# Patient Record
Sex: FEMALE | Race: WHITE | NOT HISPANIC OR LATINO | Employment: OTHER | URBAN - METROPOLITAN AREA
[De-identification: names, ages, dates, MRNs, and addresses within clinical notes are randomized per-mention and may not be internally consistent; named-entity substitution may affect disease eponyms.]

---

## 2018-02-14 ENCOUNTER — OFFICE VISIT CONVERTED (OUTPATIENT)
Dept: PULMONOLOGY | Facility: CLINIC | Age: 58
End: 2018-02-14
Attending: INTERNAL MEDICINE

## 2018-03-09 ENCOUNTER — OFFICE VISIT CONVERTED (OUTPATIENT)
Dept: PULMONOLOGY | Facility: CLINIC | Age: 58
End: 2018-03-09
Attending: PHYSICIAN ASSISTANT

## 2018-04-11 ENCOUNTER — OFFICE VISIT CONVERTED (OUTPATIENT)
Dept: PULMONOLOGY | Facility: CLINIC | Age: 58
End: 2018-04-11
Attending: INTERNAL MEDICINE

## 2019-03-13 ENCOUNTER — OFFICE VISIT CONVERTED (OUTPATIENT)
Dept: ORTHOPEDIC SURGERY | Facility: CLINIC | Age: 59
End: 2019-03-13
Attending: ORTHOPAEDIC SURGERY

## 2019-04-19 NOTE — PROGRESS NOTES
"Date of Office Visit: 2019  Encounter Provider: Demond Owen MD  Place of Service: [facility] CARDIOLOGY  Patient Name: Shaneka Guido  :1960    Chief Complaint   Patient presents with   • Congestive Heart Failure   :     HPI: Shaneka Guido is a 58 y.o. female who presents today to \[facility]\"" care.  She has lived in many cities and even in [place], and has seen cardiologists at [place], in [place], and in [place].  However, it's been many years since she has been evaluated by a cardiologist.    All of her siblings and her mother have had severe CAD and PAD.  At the age of 35, she underwent aortobifemoral bypass.  At age 45, she woke up with an acute ischemic right leg and required urgent surgical revascularization.  She had an arterial doppler in  that reported left subclavian stenosis; she is unaware of this but does say that her blood pressure shouldn't be checked in that arm as it's \"too low.\"  She had 50-69% carotid disease in 2012.    In 2012, she presented with chest pain that she described as a feeling of \"ice\" in her chest.  She was found to have an acute anterior MI.  She underwent emergent BMS to the LAD and then underwent CABG the next day (details are in HPI).  Her LVEF was 50%.  She has had angiograms since then, but they've been difficult due to access issues.    Overall, she's done well.  She has no claudication and no symptoms of subclavian steal.  She has chronic dyspnea and does have emphysema/chronic bronchitis (moderately severe by PFTs).  She has had some very mild sensations of \"ice\" in her chest recently but nothing like before.     She denies edema, orthopnea, syncope, or palpitations.  Her PMH states that she has chronic diastolic CHF but she denies this; she does not take diuretics.    ADDENDUM:      Records were received from Dr Aleman in [place]. An echo in 2015 showed an LVEF of 40-45% with regional WMA (although I don't " see which ones) and no valvular disease.  ABIs in November 2014 reported severe disease bilaterally consistent with proximal bilateral stenosis.      Past Medical History:   Diagnosis Date   • Anxiety disorder    • CAD (coronary artery disease)     anterior MI 2012, BMS to LAD, then CABG x 4 (LIMA-LAD, Y SVG-OM1-OM2, SVG-rPDA)   • Chronic bronchitis (CMS/HCC)    • COPD (chronic obstructive pulmonary disease) (CMS/HCC)    • Essential hypertension    • Generalized headaches    • GERD (gastroesophageal reflux disease)    • Hemorrhoids    • History of tobacco use    • Hyperlipidemia    • Osteoarthritis    • Panic attacks    • Subclavian artery stenosis, left (CMS/HCC)     BP should not be checked in that arm       Past Surgical History:   Procedure Laterality Date   • ANGIOPLASTY     • BRONCHOSCOPY     • CAROTID STENT     • CHOLECYSTECTOMY     • CORONARY ARTERY BYPASS GRAFT     • HYSTERECTOMY     • LAPAROSCOPIC TUBAL LIGATION         Social History     Socioeconomic History   • Marital status: Single     Spouse name: Not on file   • Number of children: Not on file   • Years of education: Not on file   • Highest education level: Not on file   Tobacco Use   • Smoking status: Former Smoker   Substance and Sexual Activity   • Alcohol use: No     Frequency: Never   • Drug use: No       Family History   Problem Relation Age of Onset   • Chronic bronchitis Mother    • Heart disease Mother    • Hypertension Mother    • Osteoporosis Mother    • Cancer Father    • Heart disease Father    • Diabetes Father    • Hypertension Sister    • Osteoporosis Sister    • Asthma Daughter    • Hypertension Daughter    • Osteoporosis Daughter    • Hypertension Son    • Chronic bronchitis Son        Review of Systems   Constitution: Positive for malaise/fatigue.   Cardiovascular: Positive for chest pain and dyspnea on exertion.   Respiratory: Positive for cough.    All other systems reviewed and are negative.      Allergies   Allergen Reactions  "  • Albuterol Other (See Comments)     Mouth blisters, throat swells   • Iodinated Diagnostic Agents Other (See Comments)     Throat swells   • Latex Other (See Comments)     Severe blistering, throat swells   • Oxycodone Nausea And Vomiting   • Penicillins Other (See Comments)     Throat swells         Current Outpatient Medications:   •  atorvastatin (LIPITOR) 20 MG tablet, Take 20 mg by mouth Daily., Disp: , Rfl:   •  clopidogrel (PLAVIX) 75 MG tablet, Take 75 mg by mouth Daily., Disp: , Rfl: 2  •  levalbuterol (XOPENEX HFA) 45 MCG/ACT inhaler, INHALE 1 TO 2 PUFFS BY MOUTH FOUR TIMES DAILY, Disp: , Rfl: 0  •  metoprolol tartrate (LOPRESSOR) 25 MG tablet, Take 25 mg by mouth 2 (Two) Times a Day., Disp: , Rfl: 2  •  raNITIdine (ZANTAC) 150 MG tablet, Take 150 mg by mouth As Needed for Heartburn., Disp: , Rfl:   •  STIOLTO RESPIMAT 2.5-2.5 MCG/ACT aerosol solution inhaler, INHALE TWO PUFFS BY MOUTH EVERY DAY, Disp: , Rfl: 9      Objective:     Vitals:    04/22/19 1431   BP: 136/60   BP Location: Right arm   Pulse: 73   Weight: 96.6 kg (213 lb)   Height: 152.4 cm (60\")     Body mass index is 41.6 kg/m².    Physical Exam   Constitutional: She is oriented to person, place, and time.   Obese   HENT:   Head: Normocephalic.   Nose: Nose normal.   Mouth/Throat: Oropharynx is clear and moist. She has dentures.   Eyes: Conjunctivae and EOM are normal. Pupils are equal, round, and reactive to light.   Neck: Normal range of motion.   Cannot assess for JVD due to habitus   Cardiovascular: Normal rate, regular rhythm, normal heart sounds and intact distal pulses.   No murmur heard.  Pulmonary/Chest: Effort normal.   Abdominal: Soft.   Obesity limits abdominal exam   Musculoskeletal: Normal range of motion. She exhibits no edema.   Neurological: She is alert and oriented to person, place, and time. No cranial nerve deficit.   Skin: Skin is warm and dry. No rash noted.   Psychiatric: She has a normal mood and affect. Her behavior " is normal. Judgment and thought content normal.   Vitals reviewed.        ECG 12 Lead  Date/Time: 4/22/2019 2:46 PM  Performed by: Demond Owen MD  Authorized by: Demond Owen MD   Comparison: compared with previous ECG   Similar to previous ECG  Comparison to previous ECG: C/w prior, the ST/T changes are more noticeable  Rhythm: sinus rhythm  Conduction: conduction normal  Q waves: III, II and aVF    T inversion: V6, III, II and aVF  T flattening: V5, aVL and I    Clinical impression: abnormal EKG              Assessment:       Diagnosis Plan   1. Coronary artery disease involving coronary bypass graft of native heart without angina pectoris  ECG 12 Lead    Stress Test With Pet Myocardial Perfusion (MULTI STUDY, REST AND STRESS)    Adult Transthoracic Echo Complete W/ Cont if Necessary Per Protocol   2. Chronic diastolic congestive heart failure (CMS/HCC)  Stress Test With Pet Myocardial Perfusion (MULTI STUDY, REST AND STRESS)    Adult Transthoracic Echo Complete W/ Cont if Necessary Per Protocol   3. PAD (peripheral artery disease) (CMS/HCC)  Ambulatory Referral to Vascular Surgery   4. Bilateral carotid artery disease, unspecified type (CMS/HCC)  Ambulatory Referral to Vascular Surgery   5. Essential hypertension     6. Chronic obstructive pulmonary disease, unspecified COPD type (CMS/HCC)            Plan:       1.  Coronary Artery Disease  Assessment  • The patient has CCS class I - angina only during strenuous or prolonged physical activity  • She's on clopidogrel for monotherapy due to PAD, and is on atorvastatin.  She is having some mild atypical chest pain.  Her EKG is slightly changed from a prior study.  I will check a PET stress.  If that's abnormal, I do have concerns about how she would be cathed given her aortobifem and occluded left subclavian.    Subjective - Objective  • There is a history of past MI 2012  • There is a history of previous coronary artery bypass graft 2012  • There has been a  previous stent procedure using BMS 2012  • Current antiplatelet therapy includes clopidogrel 75 mg      2.  This is in her history, but her last proBNP was 700 which is normal.  I will check an echo.  She doesn't take diuretics.    3/4.  I'm referring her to vascular given her complex history of PAD/carotid/subclavian disease.    5.  This is within goal.    Sincerely,       Demond Owen MD

## 2019-04-22 ENCOUNTER — OFFICE VISIT (OUTPATIENT)
Dept: CARDIOLOGY | Facility: CLINIC | Age: 59
End: 2019-04-22

## 2019-04-22 VITALS
DIASTOLIC BLOOD PRESSURE: 60 MMHG | SYSTOLIC BLOOD PRESSURE: 136 MMHG | HEIGHT: 60 IN | BODY MASS INDEX: 41.82 KG/M2 | HEART RATE: 73 BPM | WEIGHT: 213 LBS

## 2019-04-22 DIAGNOSIS — I73.9 PAD (PERIPHERAL ARTERY DISEASE) (HCC): ICD-10-CM

## 2019-04-22 DIAGNOSIS — I25.810 CORONARY ARTERY DISEASE INVOLVING CORONARY BYPASS GRAFT OF NATIVE HEART WITHOUT ANGINA PECTORIS: Primary | ICD-10-CM

## 2019-04-22 DIAGNOSIS — J44.9 CHRONIC OBSTRUCTIVE PULMONARY DISEASE, UNSPECIFIED COPD TYPE (HCC): ICD-10-CM

## 2019-04-22 DIAGNOSIS — I50.32 CHRONIC DIASTOLIC CONGESTIVE HEART FAILURE (HCC): ICD-10-CM

## 2019-04-22 DIAGNOSIS — I77.9 BILATERAL CAROTID ARTERY DISEASE, UNSPECIFIED TYPE (HCC): ICD-10-CM

## 2019-04-22 DIAGNOSIS — I10 ESSENTIAL HYPERTENSION: ICD-10-CM

## 2019-04-22 PROCEDURE — 99204 OFFICE O/P NEW MOD 45 MIN: CPT | Performed by: INTERNAL MEDICINE

## 2019-04-22 PROCEDURE — 93000 ELECTROCARDIOGRAM COMPLETE: CPT | Performed by: INTERNAL MEDICINE

## 2019-04-22 RX ORDER — LEVALBUTEROL TARTRATE 45 UG/1
AEROSOL, METERED ORAL
Refills: 0 | COMMUNITY
Start: 2019-03-01 | End: 2019-06-17

## 2019-04-22 RX ORDER — TIOTROPIUM BROMIDE AND OLODATEROL 3.124; 2.736 UG/1; UG/1
SPRAY, METERED RESPIRATORY (INHALATION)
Refills: 9 | COMMUNITY
Start: 2019-04-01 | End: 2019-06-17

## 2019-04-22 RX ORDER — RANITIDINE 150 MG/1
150 TABLET ORAL NIGHTLY
COMMUNITY
End: 2020-01-23 | Stop reason: RX

## 2019-04-22 RX ORDER — CLOPIDOGREL BISULFATE 75 MG/1
75 TABLET ORAL DAILY
Refills: 2 | COMMUNITY
Start: 2019-04-01

## 2019-04-22 RX ORDER — ATORVASTATIN CALCIUM 20 MG/1
20 TABLET, FILM COATED ORAL DAILY
COMMUNITY

## 2019-04-29 ENCOUNTER — HOSPITAL ENCOUNTER (OUTPATIENT)
Dept: CARDIOLOGY | Facility: HOSPITAL | Age: 59
Discharge: HOME | End: 2019-04-29
Admitting: INTERNAL MEDICINE

## 2019-04-29 ENCOUNTER — HOSPITAL ENCOUNTER (OUTPATIENT)
Dept: CARDIOLOGY | Facility: HOSPITAL | Age: 59
Discharge: HOME | End: 2019-04-29

## 2019-04-29 VITALS
BODY MASS INDEX: 41.82 KG/M2 | HEIGHT: 60 IN | HEART RATE: 80 BPM | SYSTOLIC BLOOD PRESSURE: 179 MMHG | DIASTOLIC BLOOD PRESSURE: 80 MMHG | WEIGHT: 213 LBS

## 2019-04-29 DIAGNOSIS — I50.32 CHRONIC DIASTOLIC CONGESTIVE HEART FAILURE (HCC): ICD-10-CM

## 2019-04-29 DIAGNOSIS — I25.810 CORONARY ARTERY DISEASE INVOLVING CORONARY BYPASS GRAFT OF NATIVE HEART WITHOUT ANGINA PECTORIS: ICD-10-CM

## 2019-04-29 DIAGNOSIS — R94.39 ABNORMAL CARDIOVASCULAR STRESS TEST: ICD-10-CM

## 2019-04-29 DIAGNOSIS — I25.708 CORONARY ARTERY DISEASE OF BYPASS GRAFT OF NATIVE HEART WITH STABLE ANGINA PECTORIS (HCC): Primary | ICD-10-CM

## 2019-04-29 LAB
ASCENDING AORTA: 2.5 CM
BH CV ECHO MEAS - ACS: 2.1 CM
BH CV ECHO MEAS - AO MAX PG (FULL): 2.6 MMHG
BH CV ECHO MEAS - AO MAX PG: 5.1 MMHG
BH CV ECHO MEAS - AO MEAN PG (FULL): 1.2 MMHG
BH CV ECHO MEAS - AO MEAN PG: 2.7 MMHG
BH CV ECHO MEAS - AO ROOT AREA (BSA CORRECTED): 1.6
BH CV ECHO MEAS - AO ROOT AREA: 7.3 CM^2
BH CV ECHO MEAS - AO ROOT DIAM: 3 CM
BH CV ECHO MEAS - AO V2 MAX: 112.5 CM/SEC
BH CV ECHO MEAS - AO V2 MEAN: 79.3 CM/SEC
BH CV ECHO MEAS - AO V2 VTI: 26 CM
BH CV ECHO MEAS - AVA(I,A): 2.2 CM^2
BH CV ECHO MEAS - AVA(I,D): 2.2 CM^2
BH CV ECHO MEAS - AVA(V,A): 2 CM^2
BH CV ECHO MEAS - AVA(V,D): 2 CM^2
BH CV ECHO MEAS - BSA(HAYCOCK): 2.1 M^2
BH CV ECHO MEAS - BSA: 1.9 M^2
BH CV ECHO MEAS - BZI_BMI: 41.6 KILOGRAMS/M^2
BH CV ECHO MEAS - BZI_METRIC_HEIGHT: 152.4 CM
BH CV ECHO MEAS - BZI_METRIC_WEIGHT: 96.6 KG
BH CV ECHO MEAS - EDV(MOD-SP2): 100 ML
BH CV ECHO MEAS - EDV(MOD-SP4): 103 ML
BH CV ECHO MEAS - EF(MOD-BP): 57 %
BH CV ECHO MEAS - EF(MOD-SP2): 56 %
BH CV ECHO MEAS - EF(MOD-SP4): 57.3 %
BH CV ECHO MEAS - ESV(MOD-SP2): 44 ML
BH CV ECHO MEAS - ESV(MOD-SP4): 44 ML
BH CV ECHO MEAS - LAT PEAK E' VEL: 11 CM/SEC
BH CV ECHO MEAS - LV DIASTOLIC VOL/BSA (35-75): 53.7 ML/M^2
BH CV ECHO MEAS - LV MAX PG: 2.4 MMHG
BH CV ECHO MEAS - LV MEAN PG: 1.5 MMHG
BH CV ECHO MEAS - LV SYSTOLIC VOL/BSA (12-30): 22.9 ML/M^2
BH CV ECHO MEAS - LV V1 MAX: 78.1 CM/SEC
BH CV ECHO MEAS - LV V1 MEAN: 59.3 CM/SEC
BH CV ECHO MEAS - LV V1 VTI: 19.6 CM
BH CV ECHO MEAS - LVLD AP2: 7.1 CM
BH CV ECHO MEAS - LVLD AP4: 7.1 CM
BH CV ECHO MEAS - LVLS AP2: 7 CM
BH CV ECHO MEAS - LVLS AP4: 6.6 CM
BH CV ECHO MEAS - LVOT AREA (M): 2.8 CM^2
BH CV ECHO MEAS - LVOT AREA: 2.9 CM^2
BH CV ECHO MEAS - LVOT DIAM: 1.9 CM
BH CV ECHO MEAS - MED PEAK E' VEL: 5 CM/SEC
BH CV ECHO MEAS - MR MAX PG: 30.5 MMHG
BH CV ECHO MEAS - MR MAX VEL: 276.1 CM/SEC
BH CV ECHO MEAS - MV A DUR: 0.14 SEC
BH CV ECHO MEAS - MV A MAX VEL: 88.8 CM/SEC
BH CV ECHO MEAS - MV DEC SLOPE: 409.1 CM/SEC^2
BH CV ECHO MEAS - MV DEC TIME: 0.23 SEC
BH CV ECHO MEAS - MV E MAX VEL: 92.6 CM/SEC
BH CV ECHO MEAS - MV E/A: 1
BH CV ECHO MEAS - MV MAX PG: 2.8 MMHG
BH CV ECHO MEAS - MV MEAN PG: 1.4 MMHG
BH CV ECHO MEAS - MV P1/2T MAX VEL: 94.6 CM/SEC
BH CV ECHO MEAS - MV P1/2T: 67.7 MSEC
BH CV ECHO MEAS - MV V2 MAX: 83.8 CM/SEC
BH CV ECHO MEAS - MV V2 MEAN: 55.5 CM/SEC
BH CV ECHO MEAS - MV V2 VTI: 34.4 CM
BH CV ECHO MEAS - MVA P1/2T LCG: 2.3 CM^2
BH CV ECHO MEAS - MVA(P1/2T): 3.2 CM^2
BH CV ECHO MEAS - MVA(VTI): 1.7 CM^2
BH CV ECHO MEAS - PA ACC TIME: 0.14 SEC
BH CV ECHO MEAS - PA MAX PG (FULL): 0.42 MMHG
BH CV ECHO MEAS - PA MAX PG: 2 MMHG
BH CV ECHO MEAS - PA PR(ACCEL): 15.6 MMHG
BH CV ECHO MEAS - PA V2 MAX: 71.4 CM/SEC
BH CV ECHO MEAS - PULM A REVS DUR: 0.1 SEC
BH CV ECHO MEAS - PULM A REVS VEL: 19.2 CM/SEC
BH CV ECHO MEAS - PULM DIAS VEL: 47.2 CM/SEC
BH CV ECHO MEAS - PULM S/D: 0.65
BH CV ECHO MEAS - PULM SYS VEL: 30.8 CM/SEC
BH CV ECHO MEAS - RV MAX PG: 1.6 MMHG
BH CV ECHO MEAS - RV MEAN PG: 0.87 MMHG
BH CV ECHO MEAS - RV V1 MAX: 63.5 CM/SEC
BH CV ECHO MEAS - RV V1 MEAN: 44.2 CM/SEC
BH CV ECHO MEAS - RV V1 VTI: 14.6 CM
BH CV ECHO MEAS - SI(AO): 98.6 ML/M^2
BH CV ECHO MEAS - SI(LVOT): 29.9 ML/M^2
BH CV ECHO MEAS - SI(MOD-SP2): 29.2 ML/M^2
BH CV ECHO MEAS - SI(MOD-SP4): 30.8 ML/M^2
BH CV ECHO MEAS - SUP REN AO DIAM: 2.2 CM
BH CV ECHO MEAS - SV(AO): 189 ML
BH CV ECHO MEAS - SV(LVOT): 57.4 ML
BH CV ECHO MEAS - SV(MOD-SP2): 56 ML
BH CV ECHO MEAS - SV(MOD-SP4): 59 ML
BH CV ECHO MEAS - TAPSE (>1.6): 2 CM2
BH CV ECHO MEAS - TR MAX VEL: 191.4 CM/SEC
BH CV ECHO MEASUREMENTS AVERAGE E/E' RATIO: 11.58
BH CV NUCLEAR PRIOR STUDY: 2
BH CV STRESS BP STAGE 1: NORMAL
BH CV STRESS COMMENTS STAGE 1: NORMAL
BH CV STRESS DOSE REGADENOSON STAGE 1: 0.4
BH CV STRESS DURATION MIN STAGE 1: 0
BH CV STRESS DURATION SEC STAGE 1: 10
BH CV STRESS HR STAGE 1: 110
BH CV STRESS PROTOCOL 1: NORMAL
BH CV STRESS RECOVERY BP: NORMAL MMHG
BH CV STRESS RECOVERY HR: 84 BPM
BH CV STRESS STAGE 1: 1
BH CV XLRA - RV BASE: 2.5 CM
BH CV XLRA - TDI S': 10 CM/SEC
LEFT ATRIUM VOLUME INDEX: 21 ML/M2
LV EF NUC BP: 50 %
MAXIMAL PREDICTED HEART RATE: 163 BPM
MAXIMAL PREDICTED HEART RATE: 163 BPM
PERCENT MAX PREDICTED HR: 67.48 %
SINUS: 2.9 CM
STJ: 2.3 CM
STRESS BASELINE BP: NORMAL MMHG
STRESS BASELINE HR: 79 BPM
STRESS PERCENT HR: 79 %
STRESS POST EXERCISE DUR SEC: 10 SEC
STRESS POST PEAK BP: NORMAL MMHG
STRESS POST PEAK HR: 110 BPM
STRESS TARGET HR: 139 BPM
STRESS TARGET HR: 139 BPM

## 2019-04-29 PROCEDURE — 25010000002 REGADENOSON 0.4 MG/5ML SOLUTION: Performed by: INTERNAL MEDICINE

## 2019-04-29 PROCEDURE — 93306 TTE W/DOPPLER COMPLETE: CPT

## 2019-04-29 PROCEDURE — 78492 MYOCRD IMG PET MLT RST&STRS: CPT

## 2019-04-29 PROCEDURE — 93016 CV STRESS TEST SUPVJ ONLY: CPT | Performed by: INTERNAL MEDICINE

## 2019-04-29 PROCEDURE — 78492 MYOCRD IMG PET MLT RST&STRS: CPT | Performed by: INTERNAL MEDICINE

## 2019-04-29 PROCEDURE — A9555 RB82 RUBIDIUM: HCPCS | Performed by: INTERNAL MEDICINE

## 2019-04-29 PROCEDURE — 93018 CV STRESS TEST I&R ONLY: CPT | Performed by: INTERNAL MEDICINE

## 2019-04-29 PROCEDURE — 0 RUBIDIUM CHLORIDE: Performed by: INTERNAL MEDICINE

## 2019-04-29 PROCEDURE — 93017 CV STRESS TEST TRACING ONLY: CPT

## 2019-04-29 PROCEDURE — 93306 TTE W/DOPPLER COMPLETE: CPT | Performed by: INTERNAL MEDICINE

## 2019-04-29 RX ADMIN — RUBIDIUM CHLORIDE RB-82 1 DOSE: 150 INJECTION, SOLUTION INTRAVENOUS at 13:45

## 2019-04-29 RX ADMIN — RUBIDIUM CHLORIDE RB-82 1 DOSE: 150 INJECTION, SOLUTION INTRAVENOUS at 14:14

## 2019-04-29 RX ADMIN — REGADENOSON 0.4 MG: 0.08 INJECTION, SOLUTION INTRAVENOUS at 14:15

## 2019-05-06 ENCOUNTER — TELEPHONE (OUTPATIENT)
Dept: CARDIOLOGY | Facility: CLINIC | Age: 59
End: 2019-05-06

## 2019-05-06 NOTE — TELEPHONE ENCOUNTER
Pt called to discuss that she received her pre op medications for the iodine/ contrast allergy.  Pt was very concerned about having a reaction to Prednisone (pre op protocol for allergy reaction), which she reports that she has an reaction of fluid retention and was advised in the past not to take it.  This was not noted in her allergy list, but has now been updated.  Pt also reports that she received a script for Benadryl 50 mg to take once at 0900, 1500 and another dose in the morning of the procedure and she was concerned about not waking up.  Pt stated during the conversation that she has never informed anyone that she is allergic to the iodine contrast and doesn't have a reaction to the contrast ; she would like for it to be removed.  I have updated pt' allergy list and removed the iodine contrast from her list.    Pt was agreeable.  I did inform her of the risk of taking it off her list and not taking the premeds as directed for the allergy.   Pt verbalized understanding.

## 2019-05-07 ENCOUNTER — HOSPITAL ENCOUNTER (OUTPATIENT)
Dept: OTHER | Facility: HOSPITAL | Age: 59
Discharge: HOME | End: 2019-05-07

## 2019-05-07 LAB
BASOPHILS # BLD AUTO: 0.04 10*3/UL (ref 0–0.2)
BASOPHILS NFR BLD AUTO: 0.6 % (ref 0–3)
CONV ABS IMM GRAN: 0.03 10*3/UL (ref 0–0.2)
CONV IMMATURE GRAN: 0.4 % (ref 0–1.8)
DEPRECATED RDW RBC AUTO: 45.5 FL (ref 36.4–46.3)
EOSINOPHIL # BLD AUTO: 0.12 10*3/UL (ref 0–0.7)
EOSINOPHIL # BLD AUTO: 1.7 % (ref 0–7)
ERYTHROCYTE [DISTWIDTH] IN BLOOD BY AUTOMATED COUNT: 14.4 % (ref 11.7–14.4)
HBA1C MFR BLD: 12.5 G/DL (ref 12–16)
HCT VFR BLD AUTO: 39 % (ref 37–47)
LYMPHOCYTES # BLD AUTO: 1.58 10*3/UL (ref 1–5)
MCH RBC QN AUTO: 28 PG (ref 27–31)
MCHC RBC AUTO-ENTMCNC: 32.1 G/DL (ref 33–37)
MCV RBC AUTO: 87.2 FL (ref 81–99)
MONOCYTES # BLD AUTO: 0.46 10*3/UL (ref 0.2–1.2)
MONOCYTES NFR BLD AUTO: 6.5 % (ref 3–10)
NEUTROPHILS # BLD AUTO: 4.87 10*3/UL (ref 2–8)
NEUTROPHILS NFR BLD AUTO: 68.5 % (ref 30–85)
NRBC CBCN: 0 % (ref 0–0.7)
PLATELET # BLD AUTO: 250 10*3/UL (ref 130–400)
PMV BLD AUTO: 9.5 FL (ref 9.4–12.3)
RBC # BLD AUTO: 4.47 10*6/UL (ref 4.2–5.4)
VARIANT LYMPHS NFR BLD MANUAL: 22.3 % (ref 20–45)
WBC # BLD AUTO: 7.1 10*3/UL (ref 4.8–10.8)

## 2019-05-08 ENCOUNTER — HOSPITAL ENCOUNTER (OUTPATIENT)
Facility: HOSPITAL | Age: 59
Setting detail: HOSPITAL OUTPATIENT SURGERY
Discharge: HOME | End: 2019-05-08
Attending: INTERNAL MEDICINE | Admitting: INTERNAL MEDICINE

## 2019-05-08 VITALS
BODY MASS INDEX: 39.65 KG/M2 | TEMPERATURE: 97.8 F | OXYGEN SATURATION: 98 % | RESPIRATION RATE: 16 BRPM | SYSTOLIC BLOOD PRESSURE: 125 MMHG | HEIGHT: 61 IN | DIASTOLIC BLOOD PRESSURE: 80 MMHG | WEIGHT: 210 LBS | HEART RATE: 63 BPM

## 2019-05-08 DIAGNOSIS — R94.39 ABNORMAL CARDIOVASCULAR STRESS TEST: ICD-10-CM

## 2019-05-08 DIAGNOSIS — I25.708 CORONARY ARTERY DISEASE OF BYPASS GRAFT OF NATIVE HEART WITH STABLE ANGINA PECTORIS (HCC): ICD-10-CM

## 2019-05-08 LAB
ANION GAP SERPL CALC-SCNC: 15 MMOL/L (ref 8–19)
BUN SERPL-MCNC: 16 MG/DL (ref 5–25)
BUN/CREAT SERPL: 15 {RATIO} (ref 6–20)
CALCIUM SERPL-MCNC: 9.3 MG/DL (ref 8.7–10.4)
CHLORIDE SERPL-SCNC: 103 MMOL/L (ref 99–111)
CONV CO2: 27 MMOL/L (ref 22–32)
CREAT UR-MCNC: 1.05 MG/DL (ref 0.5–0.9)
GFR SERPLBLD BASED ON 1.73 SQ M-ARVRAT: 59 ML/MIN/{1.73_M2}
GLUCOSE SERPL-MCNC: 101 MG/DL (ref 65–99)
OSMOLALITY SERPL CALC.SUM OF ELEC: 291 MOSM/KG (ref 273–304)
POTASSIUM SERPL-SCNC: 4.7 MMOL/L (ref 3.5–5.3)
SODIUM SERPL-SCNC: 140 MMOL/L (ref 135–147)

## 2019-05-08 PROCEDURE — C1894 INTRO/SHEATH, NON-LASER: HCPCS | Performed by: INTERNAL MEDICINE

## 2019-05-08 PROCEDURE — 99153 MOD SED SAME PHYS/QHP EA: CPT | Performed by: INTERNAL MEDICINE

## 2019-05-08 PROCEDURE — 93567 NJX CAR CTH SPRVLV AORTGRPHY: CPT | Performed by: INTERNAL MEDICINE

## 2019-05-08 PROCEDURE — 25010000002 HEPARIN (PORCINE) PER 1000 UNITS: Performed by: INTERNAL MEDICINE

## 2019-05-08 PROCEDURE — 93459 L HRT ART/GRFT ANGIO: CPT | Performed by: INTERNAL MEDICINE

## 2019-05-08 PROCEDURE — 99152 MOD SED SAME PHYS/QHP 5/>YRS: CPT | Performed by: INTERNAL MEDICINE

## 2019-05-08 PROCEDURE — 25010000002 FENTANYL CITRATE (PF) 100 MCG/2ML SOLUTION: Performed by: INTERNAL MEDICINE

## 2019-05-08 PROCEDURE — 25010000002 ONDANSETRON PER 1 MG: Performed by: INTERNAL MEDICINE

## 2019-05-08 PROCEDURE — C1769 GUIDE WIRE: HCPCS | Performed by: INTERNAL MEDICINE

## 2019-05-08 PROCEDURE — 0 IOPAMIDOL PER 1 ML: Performed by: INTERNAL MEDICINE

## 2019-05-08 PROCEDURE — 25010000002 MIDAZOLAM PER 1 MG: Performed by: INTERNAL MEDICINE

## 2019-05-08 RX ORDER — LIDOCAINE HYDROCHLORIDE 20 MG/ML
INJECTION, SOLUTION INFILTRATION; PERINEURAL AS NEEDED
Status: DISCONTINUED | OUTPATIENT
Start: 2019-05-08 | End: 2019-05-08 | Stop reason: HOSPADM

## 2019-05-08 RX ORDER — ONDANSETRON 2 MG/ML
4 INJECTION INTRAMUSCULAR; INTRAVENOUS ONCE AS NEEDED
Status: COMPLETED | OUTPATIENT
Start: 2019-05-08 | End: 2019-05-08

## 2019-05-08 RX ORDER — FENTANYL CITRATE 50 UG/ML
INJECTION, SOLUTION INTRAMUSCULAR; INTRAVENOUS AS NEEDED
Status: DISCONTINUED | OUTPATIENT
Start: 2019-05-08 | End: 2019-05-08 | Stop reason: HOSPADM

## 2019-05-08 RX ORDER — ISOSORBIDE MONONITRATE 60 MG/1
60 TABLET, EXTENDED RELEASE ORAL 2 TIMES DAILY
COMMUNITY

## 2019-05-08 RX ORDER — MIDAZOLAM HYDROCHLORIDE 1 MG/ML
INJECTION INTRAMUSCULAR; INTRAVENOUS AS NEEDED
Status: DISCONTINUED | OUTPATIENT
Start: 2019-05-08 | End: 2019-05-08 | Stop reason: HOSPADM

## 2019-05-08 RX ORDER — NITROGLYCERIN 5 MG/ML
INJECTION, SOLUTION INTRAVENOUS AS NEEDED
Status: DISCONTINUED | OUTPATIENT
Start: 2019-05-08 | End: 2019-05-08 | Stop reason: HOSPADM

## 2019-05-08 RX ORDER — SODIUM CHLORIDE 0.9 % (FLUSH) 0.9 %
3-10 SYRINGE (ML) INJECTION AS NEEDED
Status: DISCONTINUED | OUTPATIENT
Start: 2019-05-08 | End: 2019-05-08 | Stop reason: HOSPADM

## 2019-05-08 RX ORDER — SODIUM CHLORIDE 0.9 % (FLUSH) 0.9 %
3 SYRINGE (ML) INJECTION EVERY 12 HOURS SCHEDULED
Status: DISCONTINUED | OUTPATIENT
Start: 2019-05-08 | End: 2019-05-08 | Stop reason: HOSPADM

## 2019-05-08 RX ORDER — LIDOCAINE HYDROCHLORIDE 10 MG/ML
0.1 INJECTION, SOLUTION EPIDURAL; INFILTRATION; INTRACAUDAL; PERINEURAL ONCE AS NEEDED
Status: DISCONTINUED | OUTPATIENT
Start: 2019-05-08 | End: 2019-05-08 | Stop reason: HOSPADM

## 2019-05-08 RX ORDER — NITROGLYCERIN 20 MG/100ML
INJECTION INTRAVENOUS CONTINUOUS PRN
Status: DISCONTINUED | OUTPATIENT
Start: 2019-05-08 | End: 2019-05-08 | Stop reason: HOSPADM

## 2019-05-08 RX ORDER — RANOLAZINE 500 MG/1
500 TABLET, EXTENDED RELEASE ORAL 2 TIMES DAILY
Qty: 90 TABLET | Refills: 3 | Status: SHIPPED | OUTPATIENT
Start: 2019-05-08 | End: 2019-08-05

## 2019-05-08 RX ORDER — SODIUM CHLORIDE 9 MG/ML
100 INJECTION, SOLUTION INTRAVENOUS CONTINUOUS
Status: DISCONTINUED | OUTPATIENT
Start: 2019-05-08 | End: 2019-05-08 | Stop reason: HOSPADM

## 2019-05-08 RX ORDER — VERAPAMIL HYDROCHLORIDE 2.5 MG/ML
INJECTION, SOLUTION INTRAVENOUS AS NEEDED
Status: DISCONTINUED | OUTPATIENT
Start: 2019-05-08 | End: 2019-05-08 | Stop reason: HOSPADM

## 2019-05-08 RX ORDER — SODIUM CHLORIDE 9 MG/ML
75 INJECTION, SOLUTION INTRAVENOUS CONTINUOUS
Status: DISCONTINUED | OUTPATIENT
Start: 2019-05-08 | End: 2019-05-08 | Stop reason: HOSPADM

## 2019-05-08 RX ADMIN — SODIUM CHLORIDE 75 ML/HR: 9 INJECTION, SOLUTION INTRAVENOUS at 08:16

## 2019-05-08 RX ADMIN — ONDANSETRON 4 MG: 2 INJECTION INTRAMUSCULAR; INTRAVENOUS at 12:43

## 2019-05-08 RX ADMIN — SODIUM CHLORIDE 100 ML/HR: 9 INJECTION, SOLUTION INTRAVENOUS at 12:54

## 2019-05-08 NOTE — DISCHARGE INSTRUCTIONS
[facility]  [address] [place] [address]    Coronary Angiogram (Radial/Ulnar Approach) After Care    Refer to this sheet in the next few weeks. These instructions provide you with information on caring for yourself after your procedure. Your caregiver may also give you more specific instructions. Your treatment has been planned according to current medical practices, but problems sometimes occur. Call your caregiver if you have any problems or questions after your procedure.    Home Care Instructions:  · You may shower the day after the procedure. Remove the bandage (dressing) and gently wash the site with plain soap and water. Gently pat the site dry. You may apply a band aid daily for 2 days if desired.    · Do not apply powder or lotion to the site.  · Do not submerge the affected site in water for 3 to 5 days or until the site is completely healed.   · Do not lift, push or pull anything over 10 pounds for 2 days after your procedure.  · Inspect the site at least twice daily. You may notice some bruising at the site and it may be tender for 1 to 2 weeks.     · Increase your fluid intake for the next 2 days.    · Keep arm elevated for 24 hours. For the remainder of the day, keep your arm in “Pledge of Allegiance” position when up and about.     · You may drive 24 hours after the procedure unless otherwise instructed by your caregiver.  · Do not operate machinery or power tools for 24 hours.  · A responsible adult should be with you for the first 24 hours after you arrive home. Do not make any important legal decisions or sign legal papers for 24 hours.  Do not drink alcohol for 24 hours.    · Metformin or any medications containing Metformin should not be taken for 48 hours after your procedure.      Call Your Doctor if:   · You have unusual pain at the radial/ulnar (wrist) site.  · You have redness, warmth, swelling, or pain at the radial/ulnar (wrist) site.  · You have drainage (other  than a small amount of blood on the dressing).  · You have chills or a fever > 101.  · Your arm becomes pale or dark, cool, tingly, or numb.  · You have heavy bleeding from the site, hold pressure on the site for 20 minutes.  If the bleeding stops, apply a fresh bandage and call your cardiologist.  However, if you continue to have bleeding, call 911.

## 2019-05-08 NOTE — TELEPHONE ENCOUNTER
I was present for this conversation. Patient is adamant that she does not have an iodine allergy and is unsure how it got into her chart, and she is adamant that she will not take the steroids due to a bad reaction to those.    Documented.

## 2019-05-08 NOTE — INTERVAL H&P NOTE
H&P reviewed. The patient was examined and there are no changes to the H&P.     Inferior ischemia, LHC today

## 2019-05-08 NOTE — PERIOPERATIVE NURSING NOTE
Dr Zhu at bedside to speak with pt and family post procedure, this RN to call pharmacy and update pt medication, MD to adjust medications prior to discharge

## 2019-05-09 ENCOUNTER — TELEPHONE (OUTPATIENT)
Dept: CARDIOLOGY | Facility: CLINIC | Age: 59
End: 2019-05-09

## 2019-05-09 NOTE — TELEPHONE ENCOUNTER
----- Message from Demond Owen MD sent at 5/9/2019 12:23 PM EDT -----  This is pretty bad.  Ranolazine has been called to her pharmacy but it requires a PA, which they sent to Dr Zhu but we should do that.    She also needs a 2 week f/u with BETZAIDA DENG

## 2019-05-23 ENCOUNTER — OFFICE VISIT (OUTPATIENT)
Dept: CARDIOLOGY | Facility: CLINIC | Age: 59
End: 2019-05-23

## 2019-05-23 VITALS
WEIGHT: 214 LBS | SYSTOLIC BLOOD PRESSURE: 150 MMHG | HEART RATE: 63 BPM | BODY MASS INDEX: 40.4 KG/M2 | DIASTOLIC BLOOD PRESSURE: 70 MMHG | HEIGHT: 61 IN

## 2019-05-23 DIAGNOSIS — I73.9 PAD (PERIPHERAL ARTERY DISEASE) (HCC): ICD-10-CM

## 2019-05-23 DIAGNOSIS — I25.118 CORONARY ARTERY DISEASE OF NATIVE ARTERY OF NATIVE HEART WITH STABLE ANGINA PECTORIS (HCC): Primary | ICD-10-CM

## 2019-05-23 DIAGNOSIS — I10 ESSENTIAL HYPERTENSION: ICD-10-CM

## 2019-05-23 DIAGNOSIS — E66.01 MORBIDLY OBESE (HCC): ICD-10-CM

## 2019-05-23 PROCEDURE — 93000 ELECTROCARDIOGRAM COMPLETE: CPT | Performed by: NURSE PRACTITIONER

## 2019-05-23 PROCEDURE — 99214 OFFICE O/P EST MOD 30 MIN: CPT | Performed by: NURSE PRACTITIONER

## 2019-05-23 NOTE — PROGRESS NOTES
"  Date of Office Visit: 2019  Encounter Provider: CLIFFORD Keys  Place of Service: [facility] CARDIOLOGY  Patient Name: Shaneka Guido  :1960      Chief Complaint   Patient presents with   • Coronary Artery Disease     Cath Follow Up   :     Dear Dr. Andrea Salazar,     HPI: Shaneka Guido is a pleasant 58 y.o. female who presents today for cardiac follow up. She is a new patient to me and her previous records have been reviewed.     She has a history of peripheral arterial disease and age 35 and underwent aortobifemoral bypass.  At age 45, she woke up with an acute ischemic right leg and required urgent surgical revascularization.  She has been diagnosed with left subclavian stenosis and carotid artery disease.  In , she suffered an acute anterior myocardial infarction and underwent bare-metal stent placement to the LAD and coronary artery bypass graft surgery the following day.    She was evaluated by Demond Owen on 2019.  She was having mild atypical chest pain and a cardiac PET scan was ordered.  This was completed on 2019 and showed an EF of 50% and large size, moderate to severe area of ischemia in inferior wall.  An echocardiogram was completed 2019 with the following results: EF 51-55%, grade 2 diastolic dysfunction, akinesis of the distal anterior septum and anterior apex, left atrium mildly dilated, and mild mitral valve insufficiency.    Dr. Owen recommended cardiac catheterization which was completed by Dr. Nickie Zhu on 2019 with the following results: \"Severe multivessel coronary disease.  The jump vein grafts to the OM1 and OM2 is occluded.  The vein graft to the circumflex and RCA are occluded.  The proximal RCA is occluded and receives robust collaterals from the left.  The LAD has mild diffuse disease and a patent proximal stent.  The LIMA backfills via the LAD.  The proximal subclavian is totally occluded.\"  Medical " "treatment was recommended and she was started on ranolazine 500 mg twice a day.    She presents today for follow-up of her cardiac catheterization results.  We went over the catheterization and she verbalizes understanding.  She was experiencing this freezing heart pain and chest pain which is improved on the ranolazine.  She now complains of a left-sided sharp pain around her left subclavian which she describes as a \"yanking and stabbing\" sensation for the past 1.5 weeks after catheterization.  She has shortness of breath, occasional skipped heartbeat, lower extremity edema, and occasional lightheadedness.  She denies any signs of subclavian steal syndrome.  Her blood pressure is elevated today and she quit smoking about a year ago.    Past Medical History:   Diagnosis Date   • Anxiety disorder    • CAD (coronary artery disease)     anterior MI 2012, BMS to LAD, then CABG x 4 (LIMA-LAD, Y SVG-OM1-OM2, SVG-rPDA)   • Chronic bronchitis (CMS/HCC)    • Chronic diastolic (congestive) heart failure (CMS/HCC)    • COPD (chronic obstructive pulmonary disease) (CMS/HCC)    • Essential hypertension    • Generalized headaches    • GERD (gastroesophageal reflux disease)    • Hemorrhoids    • History of tobacco use    • Hyperlipidemia    • Osteoarthritis    • PAD (peripheral artery disease) (CMS/HCC)    • Panic attacks    • Subclavian artery stenosis, left (CMS/HCC)     BP should not be checked in that arm       Past Surgical History:   Procedure Laterality Date   • ANGIOPLASTY     • BRONCHOSCOPY     • CARDIAC CATHETERIZATION N/A 5/8/2019    Procedure: Coronary angiography;  Surgeon: Nickie Zhu MD;  Location: [facility] CATH INVASIVE LOCATION;  Service: Cardiovascular   • CARDIAC CATHETERIZATION N/A 5/8/2019    Procedure: Left heart cath;  Surgeon: Nickie Zhu MD;  Location:  [facility] CATH INVASIVE LOCATION;  Service: Cardiovascular   • CARDIAC CATHETERIZATION N/A 5/8/2019    Procedure: Left ventriculography;  Surgeon: Marko" MD Nickie;  Location:  [facility] CATH INVASIVE LOCATION;  Service: Cardiovascular   • CARDIAC CATHETERIZATION N/A 5/8/2019    Procedure: Bypass graft study;  Surgeon: Nickie Zhu MD;  Location:  [facility] CATH INVASIVE LOCATION;  Service: Cardiovascular   • CAROTID STENT     • CHOLECYSTECTOMY     • CORONARY ARTERY BYPASS GRAFT  2012   • HYSTERECTOMY     • LAPAROSCOPIC TUBAL LIGATION         Social History     Socioeconomic History   • Marital status: Single     Spouse name: Not on file   • Number of children: Not on file   • Years of education: Not on file   • Highest education level: Not on file   Tobacco Use   • Smoking status: Former Smoker   Substance and Sexual Activity   • Alcohol use: No     Frequency: Never     Comment: Caffeine use   • Drug use: No   • Sexual activity: Defer       Family History   Problem Relation Age of Onset   • Chronic bronchitis Mother    • Heart disease Mother    • Hypertension Mother    • Osteoporosis Mother    • Cancer Father    • Heart disease Father    • Diabetes Father    • Hypertension Sister    • Osteoporosis Sister    • Asthma Daughter    • Hypertension Daughter    • Osteoporosis Daughter    • Hypertension Son    • Chronic bronchitis Son        The following portion of the patient's history were reviewed and updated as appropriate: past medical history, past surgical history, past social history, past family history, allergies, current medications, and problem list.    Review of Systems   Constitution: Positive for malaise/fatigue. Negative for chills, diaphoresis, fever, night sweats, weight gain and weight loss.   HENT: Negative for hearing loss, nosebleeds, sore throat and tinnitus.    Eyes: Negative for blurred vision, double vision, pain and visual disturbance.   Cardiovascular: Positive for chest pain, claudication, dyspnea on exertion and leg swelling. Negative for cyanosis, irregular heartbeat, near-syncope, orthopnea, palpitations, paroxysmal nocturnal dyspnea and syncope.    Respiratory: Negative for cough, hemoptysis, snoring and wheezing.    Endocrine: Positive for cold intolerance and heat intolerance. Negative for polyuria.   Hematologic/Lymphatic: Negative for bleeding problem. Does not bruise/bleed easily.   Skin: Negative for color change, dry skin, flushing and itching.   Musculoskeletal: Positive for joint pain and myalgias. Negative for falls, joint swelling, muscle cramps and muscle weakness.   Gastrointestinal: Negative for abdominal pain, constipation, heartburn, melena, nausea and vomiting.   Genitourinary: Negative for dysuria and hematuria.   Neurological: Positive for dizziness, headaches, light-headedness, numbness and paresthesias. Negative for excessive daytime sleepiness, loss of balance, seizures and vertigo.   Psychiatric/Behavioral: Positive for depression. Negative for altered mental status, memory loss and substance abuse. The patient does not have insomnia and is not nervous/anxious.    Allergic/Immunologic: Negative for environmental allergies.       Allergies   Allergen Reactions   • Albuterol Other (See Comments)     Mouth blisters, throat swells   • Latex Other (See Comments)     Severe blistering, throat swells   • Penicillins Other (See Comments)     Throat swells   • Prednisone Other (See Comments)     Fluid retention.          Current Outpatient Medications:   •  atorvastatin (LIPITOR) 20 MG tablet, Take 20 mg by mouth Daily., Disp: , Rfl:   •  clopidogrel (PLAVIX) 75 MG tablet, Take 75 mg by mouth Daily., Disp: , Rfl: 2  •  isosorbide mononitrate (IMDUR) 60 MG 24 hr tablet, Take 60 mg by mouth 2 (Two) Times a Day., Disp: , Rfl:   •  levalbuterol (XOPENEX HFA) 45 MCG/ACT inhaler, INHALE 1 TO 2 PUFFS BY MOUTH FOUR TIMES DAILY, Disp: , Rfl: 0  •  metoprolol tartrate (LOPRESSOR) 25 MG tablet, Take 25 mg by mouth 2 (Two) Times a Day., Disp: , Rfl: 2  •  raNITIdine (ZANTAC) 150 MG tablet, Take 150 mg by mouth As Needed for Heartburn., Disp: , Rfl:   •   "ranolazine (RANEXA) 500 MG 12 hr tablet, Take 1 tablet by mouth 2 (Two) Times a Day., Disp: 90 tablet, Rfl: 3  •  STIOLTO RESPIMAT 2.5-2.5 MCG/ACT aerosol solution inhaler, INHALE TWO PUFFS BY MOUTH EVERY DAY, Disp: , Rfl: 9        Objective:     Vitals:    05/23/19 1258   BP: 150/70   BP Location: Right arm   Pulse: 63   Weight: 97.1 kg (214 lb)   Height: 154.9 cm (61\")     Body mass index is 40.43 kg/m².    PHYSICAL EXAM:    Vitals Reviewed.   General Appearance: No acute distress, well developed and well nourished.  Obese.  Eyes: Conjunctiva and lids: No erythema, swelling, or discharge. Sclera non-icteric.   HENT: Atraumatic, normocephalic. External eyes, ears, and nose normal. No hearing loss noted. Mucous membranes normal. Lips not cyanotic. Neck supple with no tenderness.  Respiratory: No signs of respiratory distress. Respiration rhythm and depth normal.   Clear to auscultation. No rales, crackles, rhonchi, or wheezing auscultated.   Cardiovascular:  Jugular Venous Pressure: Normal  Heart Rate and Rhythm: Normal, Heart Sounds: Normal S1 and S2. No S3 or S4 noted.  Murmurs: No murmurs noted. No rubs, thrills, or gallops.   Arterial Pulses: Carotid pulses normal. No carotid bruit noted. Lower Extremities: No edema noted.  Left and right wrist clean dry and intact post catheterization.  Gastrointestinal:  Abdomen soft, non-distended, non-tender. Normal bowel sounds. No hepatomegaly.   Musculoskeletal: Normal movement of extremities  Skin and Nails: General appearance normal. No pallor, cyanosis, diaphoresis. Skin temperature normal. No clubbing of fingernails.   Psychiatric: Patient alert and oriented to person, place, and time. Speech and behavior appropriate. Normal mood and affect.       ECG 12 Lead  Date/Time: 5/23/2019 1:09 PM  Performed by: Daria Henderson APRN  Authorized by: Daria Henderson APRN   Comparison: compared with previous ECG from 4/22/2019  Comparison to previous ECG: Normal sinus rhythm " with T wave abnormalities  Rhythm: sinus rhythm  Rate: normal  BPM: 63  Conduction: conduction normal  ST Segments: ST segments normal  T inversion: V6  T flattening: V5, I and aVL  QRS axis: normal  Other findings: non-specific ST-T wave changes    Clinical impression: abnormal EKG              Assessment:       Diagnosis Plan   1. Coronary artery disease of native artery of native heart with stable angina pectoris (CMS/[facility])     2. PAD (peripheral artery disease) (CMS/[facility])     3. Essential hypertension     4. Morbidly obese (CMS/[facility])            Plan:       1.  Coronary Artery Disease: Recently underwent cardiac catheterization for evaluation of chest pain and abnormal stress test.  Results are described under HPI and medical treatment was recommended.  She feels that her chest pain has improved on ranolazine 500 mg twice daily.  I recommended that we further increase ranolazine to 1000 milligrams twice a day and she would like to wait because it is only been 2 weeks.  She now reports a new pain around her left subclavian/clavicle area which she describes as a stabbing discomfort.  I have low suspicion that this is cardiac.  If her chest pain worsens, I have asked her to contact the office.    Coronary Artery Disease  Assessment  • The patient has CCS class I - angina only during strenuous or prolonged physical activity    Plan  • Lifestyle modifications discussed include adhering to a heart healthy diet, avoidance of tobacco products, maintenance of a healthy weight, medication compliance, regular exercise and regular monitoring of cholesterol and blood pressure    Subjective - Objective  • There is a history of previous coronary artery bypass graft  • There has been a previous stent procedure using BMS  • Current antiplatelet therapy includes clopidogrel 75 mg      2.  Peripheral Arterial Disease: She will be seen Dr. Edwin Lnadin later this afternoon.  Dr. Owen referred her back in April for complex history of  PAD/carotid/subclavian disease.    3.  Hypertension: Blood pressure is elevated today.  We will continue to monitor.    4.  Obesity: BMI is 40.5 and she would benefit from weight loss and exercise.    5.  I have recommended follow-up with Dr. Owen in 3 months, unless otherwise needed sooner.    As always, it has been a pleasure to participate in your patient's care. Thank you.       Sincerely,         CLIFFORD Ryder        **Dragon Disclaimer:**  Much of this encounter note is an electronic transcription/translation of spoken language to printed text. The electronic translation of spoken language may permit erroneous, or at times, nonsensical words or phrases to be inadvertently transcribed. Although I have reviewed the note for such errors, some may still exist.

## 2019-06-17 ENCOUNTER — APPOINTMENT (OUTPATIENT)
Dept: PREADMISSION TESTING | Facility: HOSPITAL | Age: 59
End: 2019-06-17

## 2019-06-17 ENCOUNTER — HOSPITAL ENCOUNTER (OUTPATIENT)
Dept: GENERAL RADIOLOGY | Facility: HOSPITAL | Age: 59
Discharge: HOME | End: 2019-06-17
Admitting: SURGERY

## 2019-06-17 VITALS
WEIGHT: 209.1 LBS | HEIGHT: 61 IN | TEMPERATURE: 96.9 F | BODY MASS INDEX: 39.48 KG/M2 | OXYGEN SATURATION: 98 % | RESPIRATION RATE: 16 BRPM | DIASTOLIC BLOOD PRESSURE: 74 MMHG | HEART RATE: 64 BPM | SYSTOLIC BLOOD PRESSURE: 169 MMHG

## 2019-06-17 LAB
ALBUMIN SERPL-MCNC: 4.2 G/DL (ref 3.5–5.2)
ALBUMIN/GLOB SERPL: 1.1 G/DL
ALP SERPL-CCNC: 98 U/L (ref 39–117)
ALT SERPL W P-5'-P-CCNC: 17 U/L (ref 1–33)
ANION GAP SERPL CALCULATED.3IONS-SCNC: 11.9 MMOL/L
APTT PPP: 31.7 SECONDS (ref 22.7–35.4)
AST SERPL-CCNC: 18 U/L (ref 1–32)
BACTERIA UR QL AUTO: NORMAL /HPF
BILIRUB SERPL-MCNC: 0.5 MG/DL (ref 0.2–1.2)
BILIRUB UR QL STRIP: NEGATIVE
BUN BLD-MCNC: 15 MG/DL (ref 6–20)
BUN/CREAT SERPL: 13 (ref 7–25)
CALCIUM SPEC-SCNC: 9.5 MG/DL (ref 8.6–10.5)
CHLORIDE SERPL-SCNC: 98 MMOL/L (ref 98–107)
CLARITY UR: CLEAR
CO2 SERPL-SCNC: 25.1 MMOL/L (ref 22–29)
COLOR UR: YELLOW
CREAT BLD-MCNC: 1.15 MG/DL (ref 0.57–1)
DEPRECATED RDW RBC AUTO: 47.3 FL (ref 37–54)
ERYTHROCYTE [DISTWIDTH] IN BLOOD BY AUTOMATED COUNT: 14.6 % (ref 12.3–15.4)
GFR SERPL CREATININE-BSD FRML MDRD: 49 ML/MIN/1.73
GLOBULIN UR ELPH-MCNC: 3.7 GM/DL
GLUCOSE BLD-MCNC: 89 MG/DL (ref 65–99)
GLUCOSE UR STRIP-MCNC: NEGATIVE MG/DL
HCT VFR BLD AUTO: 40.2 % (ref 34–46.6)
HGB BLD-MCNC: 12.8 G/DL (ref 12–15.9)
HGB UR QL STRIP.AUTO: NEGATIVE
HYALINE CASTS UR QL AUTO: NORMAL /LPF
INR PPP: 1.04 (ref 0.9–1.1)
KETONES UR QL STRIP: NEGATIVE
LEUKOCYTE ESTERASE UR QL STRIP.AUTO: ABNORMAL
MCH RBC QN AUTO: 28.3 PG (ref 26.6–33)
MCHC RBC AUTO-ENTMCNC: 31.8 G/DL (ref 31.5–35.7)
MCV RBC AUTO: 88.7 FL (ref 79–97)
NITRITE UR QL STRIP: NEGATIVE
PH UR STRIP.AUTO: <=5 [PH] (ref 5–8)
PLATELET # BLD AUTO: 227 10*3/MM3 (ref 140–450)
PMV BLD AUTO: 9.5 FL (ref 6–12)
POTASSIUM BLD-SCNC: 3.7 MMOL/L (ref 3.5–5.2)
PROT SERPL-MCNC: 7.9 G/DL (ref 6–8.5)
PROT UR QL STRIP: NEGATIVE
PROTHROMBIN TIME: 13.3 SECONDS (ref 11.7–14.2)
RBC # BLD AUTO: 4.53 10*6/MM3 (ref 3.77–5.28)
RBC # UR: NORMAL /HPF
REF LAB TEST METHOD: NORMAL
SODIUM BLD-SCNC: 135 MMOL/L (ref 136–145)
SP GR UR STRIP: 1.02 (ref 1–1.03)
SQUAMOUS #/AREA URNS HPF: NORMAL /HPF
UROBILINOGEN UR QL STRIP: ABNORMAL
WBC NRBC COR # BLD: 6.58 10*3/MM3 (ref 3.4–10.8)
WBC UR QL AUTO: NORMAL /HPF

## 2019-06-17 PROCEDURE — 85610 PROTHROMBIN TIME: CPT | Performed by: SURGERY

## 2019-06-17 PROCEDURE — 71046 X-RAY EXAM CHEST 2 VIEWS: CPT

## 2019-06-17 PROCEDURE — 81001 URINALYSIS AUTO W/SCOPE: CPT | Performed by: SURGERY

## 2019-06-17 PROCEDURE — 80053 COMPREHEN METABOLIC PANEL: CPT | Performed by: SURGERY

## 2019-06-17 PROCEDURE — 36415 COLL VENOUS BLD VENIPUNCTURE: CPT

## 2019-06-17 PROCEDURE — 85730 THROMBOPLASTIN TIME PARTIAL: CPT | Performed by: SURGERY

## 2019-06-17 PROCEDURE — 85027 COMPLETE CBC AUTOMATED: CPT | Performed by: SURGERY

## 2019-06-17 RX ORDER — LEVALBUTEROL TARTRATE 45 UG/1
1-2 AEROSOL, METERED ORAL EVERY 4 HOURS PRN
COMMUNITY

## 2019-06-17 RX ORDER — FUROSEMIDE 40 MG/1
40 TABLET ORAL AS NEEDED
COMMUNITY

## 2019-06-17 NOTE — DISCHARGE INSTRUCTIONS
Take the following medications the morning of surgery with a small sip of water: INHALERS, IMDUR, METOPROLOL, ZANTAC AND RANEXA    ARRIVAL TIME 05:30        General Instructions:  • Do not eat solid food after midnight the night before surgery.  • You may drink clear liquids day of surgery but must stop at least one hour before your hospital arrival time.  • It is beneficial for you to have a clear drink that contains carbohydrates the day of surgery.  We suggest a 12 to 20 ounce bottle of Gatorade or Powerade for non-diabetic patients or a 12 to 20 ounce bottle of G2 or Powerade Zero for diabetic patients. (Pediatric patients, are not advised to drink a 12 to 20 ounce carbohydrate drink)    Clear liquids are liquids you can see through.  Nothing red in color.     Plain water                               Sports drinks  Sodas                                   Gelatin (Jell-O)  Fruit juices without pulp such as white grape juice and apple juice  Popsicles that contain no fruit or yogurt  Tea or coffee (no cream or milk added)  Gatorade / Powerade  G2 / Powerade Zero    • Patients who avoid smoking, chewing tobacco and alcohol for 4 weeks prior to surgery have a reduced risk of post-operative complications.  Quit smoking as many days before surgery as you can.  • Do not smoke, use chewing tobacco or drink alcohol the day of surgery.   • If applicable bring your C-PAP/ BI-PAP machine.  • Bring any papers given to you in the doctor’s office.  • Wear clean comfortable clothes and socks.  • Do not wear contact lenses, false eyelashes or make-up.  Bring a case for your glasses.   • Bring crutches or walker if applicable.  • Remove all piercings.  Leave jewelry and any other valuables at home.  • Hair extensions with metal clips must be removed prior to surgery.  • The Pre-Admission Testing nurse will instruct you to bring medications if unable to obtain an accurate list in Pre-Admission Testing.            Preventing a  Surgical Site Infection:  • For 2 to 3 days before surgery, avoid shaving with a razor because the razor can irritate skin and make it easier to develop an infection.    • Any areas of open skin can increase the risk of a post-operative wound infection by allowing bacteria to enter and travel throughout the body.  Notify your surgeon if you have any skin wounds / rashes even if it is not near the expected surgical site.  The area will need assessed to determine if surgery should be delayed until it is healed.  • The night prior to surgery sleep in a clean bed with clean clothing.  Do not allow pets to sleep with you.  • Shower on the morning of surgery using a fresh bar of anti-bacterial soap (such as Dial) and clean washcloth.  Dry with a clean towel and dress in clean clothing.  • Ask your surgeon if you will be receiving antibiotics prior to surgery.  • Make sure you, your family, and all healthcare providers clean their hands with soap and water or an alcohol based hand  before caring for you or your wound.    Day of surgery:  Upon arrival, a Pre-op nurse and Anesthesiologist will review your health history, obtain vital signs, and answer questions you may have.  The only belongings needed at this time will be a list of your home medications and if applicable your C-PAP/BI-PAP machine.  If you are staying overnight your family can leave the rest of your belongings in the car and bring them to your room later.  A Pre-op nurse will start an IV and you may receive medication in preparation for surgery, including something to help you relax.  Your family will be able to see you in the Pre-op area.  While you are in surgery your family should notify the waiting room  if they leave the waiting room area and provide a contact phone number.    Please be aware that surgery does come with discomfort.  We want to make every effort to control your discomfort so please discuss any uncontrolled symptoms  with your nurse.   Your doctor will most likely have prescribed pain medications.      If you are going home after surgery you will receive individualized written care instructions before being discharged.  A responsible adult must drive you to and from the hospital on the day of your surgery and stay with you for 24 hours.    If you are staying overnight following surgery, you will be transported to your hospital room following the recovery period.   has all private rooms.    You have received a list of surgical assistants for your reference.  If you have any questions please call Pre-Admission Testing at [phone].  Deductibles and co-payments are collected on the day of service. Please be prepared to pay the required co-pay, deductible or deposit on the day of service as defined by your plan.

## 2019-06-25 ENCOUNTER — HOSPITAL ENCOUNTER (OUTPATIENT)
Facility: HOSPITAL | Age: 59
Setting detail: HOSPITAL OUTPATIENT SURGERY
Discharge: HOME | End: 2019-06-25
Attending: SURGERY | Admitting: SURGERY

## 2019-06-25 ENCOUNTER — ANESTHESIA EVENT (OUTPATIENT)
Dept: PERIOP | Facility: HOSPITAL | Age: 59
End: 2019-06-25

## 2019-06-25 ENCOUNTER — ANESTHESIA (OUTPATIENT)
Dept: PERIOP | Facility: HOSPITAL | Age: 59
End: 2019-06-25

## 2019-06-25 ENCOUNTER — APPOINTMENT (OUTPATIENT)
Dept: GENERAL RADIOLOGY | Facility: HOSPITAL | Age: 59
End: 2019-06-25

## 2019-06-25 VITALS
DIASTOLIC BLOOD PRESSURE: 62 MMHG | TEMPERATURE: 98.3 F | OXYGEN SATURATION: 94 % | SYSTOLIC BLOOD PRESSURE: 105 MMHG | RESPIRATION RATE: 16 BRPM | HEART RATE: 75 BPM

## 2019-06-25 PROCEDURE — P9612 CATHETERIZE FOR URINE SPEC: HCPCS

## 2019-06-25 PROCEDURE — 25010000002 PROPOFOL 10 MG/ML EMULSION: Performed by: NURSE ANESTHETIST, CERTIFIED REGISTERED

## 2019-06-25 PROCEDURE — C1894 INTRO/SHEATH, NON-LASER: HCPCS | Performed by: SURGERY

## 2019-06-25 PROCEDURE — 25010000002 ONDANSETRON PER 1 MG: Performed by: NURSE ANESTHETIST, CERTIFIED REGISTERED

## 2019-06-25 PROCEDURE — 25010000002 PROTAMINE SULFATE PER 10 MG: Performed by: NURSE ANESTHETIST, CERTIFIED REGISTERED

## 2019-06-25 PROCEDURE — 25010000002 HEPARIN (PORCINE) PER 1000 UNITS: Performed by: NURSE ANESTHETIST, CERTIFIED REGISTERED

## 2019-06-25 PROCEDURE — C2623 CATH, TRANSLUMIN, DRUG-COAT: HCPCS | Performed by: SURGERY

## 2019-06-25 PROCEDURE — 25010000002 MIDAZOLAM PER 1 MG: Performed by: ANESTHESIOLOGY

## 2019-06-25 PROCEDURE — C1757 CATH, THROMBECTOMY/EMBOLECT: HCPCS | Performed by: SURGERY

## 2019-06-25 PROCEDURE — 25010000002 PHENYLEPHRINE PER 1 ML: Performed by: NURSE ANESTHETIST, CERTIFIED REGISTERED

## 2019-06-25 PROCEDURE — C1769 GUIDE WIRE: HCPCS | Performed by: SURGERY

## 2019-06-25 PROCEDURE — 25010000002 MIDAZOLAM PER 1 MG: Performed by: NURSE ANESTHETIST, CERTIFIED REGISTERED

## 2019-06-25 PROCEDURE — 25010000002 FENTANYL CITRATE (PF) 100 MCG/2ML SOLUTION: Performed by: NURSE ANESTHETIST, CERTIFIED REGISTERED

## 2019-06-25 PROCEDURE — C1887 CATHETER, GUIDING: HCPCS | Performed by: SURGERY

## 2019-06-25 PROCEDURE — 0 IOPAMIDOL PER 1 ML: Performed by: SURGERY

## 2019-06-25 PROCEDURE — 25010000002 HEPARIN (PORCINE) PER 1000 UNITS: Performed by: SURGERY

## 2019-06-25 RX ORDER — FLUMAZENIL 0.1 MG/ML
0.2 INJECTION INTRAVENOUS AS NEEDED
Status: DISCONTINUED | OUTPATIENT
Start: 2019-06-25 | End: 2019-06-25 | Stop reason: HOSPADM

## 2019-06-25 RX ORDER — HYDRALAZINE HYDROCHLORIDE 20 MG/ML
5 INJECTION INTRAMUSCULAR; INTRAVENOUS
Status: DISCONTINUED | OUTPATIENT
Start: 2019-06-25 | End: 2019-06-25 | Stop reason: HOSPADM

## 2019-06-25 RX ORDER — HYDROCODONE BITARTRATE AND ACETAMINOPHEN 7.5; 325 MG/1; MG/1
1 TABLET ORAL ONCE AS NEEDED
Status: DISCONTINUED | OUTPATIENT
Start: 2019-06-25 | End: 2019-06-25 | Stop reason: HOSPADM

## 2019-06-25 RX ORDER — GLYCOPYRROLATE 0.2 MG/ML
INJECTION INTRAMUSCULAR; INTRAVENOUS AS NEEDED
Status: DISCONTINUED | OUTPATIENT
Start: 2019-06-25 | End: 2019-06-25 | Stop reason: SURG

## 2019-06-25 RX ORDER — HEPARIN SODIUM 1000 [USP'U]/ML
INJECTION, SOLUTION INTRAVENOUS; SUBCUTANEOUS AS NEEDED
Status: DISCONTINUED | OUTPATIENT
Start: 2019-06-25 | End: 2019-06-25 | Stop reason: SURG

## 2019-06-25 RX ORDER — MIDAZOLAM HYDROCHLORIDE 1 MG/ML
1 INJECTION INTRAMUSCULAR; INTRAVENOUS
Status: DISCONTINUED | OUTPATIENT
Start: 2019-06-25 | End: 2019-06-25 | Stop reason: HOSPADM

## 2019-06-25 RX ORDER — SODIUM CHLORIDE, SODIUM LACTATE, POTASSIUM CHLORIDE, CALCIUM CHLORIDE 600; 310; 30; 20 MG/100ML; MG/100ML; MG/100ML; MG/100ML
9 INJECTION, SOLUTION INTRAVENOUS CONTINUOUS
Status: DISCONTINUED | OUTPATIENT
Start: 2019-06-25 | End: 2019-06-25 | Stop reason: HOSPADM

## 2019-06-25 RX ORDER — DIPHENHYDRAMINE HYDROCHLORIDE 50 MG/ML
12.5 INJECTION INTRAMUSCULAR; INTRAVENOUS
Status: DISCONTINUED | OUTPATIENT
Start: 2019-06-25 | End: 2019-06-25 | Stop reason: HOSPADM

## 2019-06-25 RX ORDER — NALOXONE HCL 0.4 MG/ML
0.2 VIAL (ML) INJECTION AS NEEDED
Status: DISCONTINUED | OUTPATIENT
Start: 2019-06-25 | End: 2019-06-25 | Stop reason: HOSPADM

## 2019-06-25 RX ORDER — MIDAZOLAM HYDROCHLORIDE 1 MG/ML
INJECTION INTRAMUSCULAR; INTRAVENOUS AS NEEDED
Status: DISCONTINUED | OUTPATIENT
Start: 2019-06-25 | End: 2019-06-25 | Stop reason: SURG

## 2019-06-25 RX ORDER — PROMETHAZINE HYDROCHLORIDE 25 MG/1
25 TABLET ORAL ONCE AS NEEDED
Status: DISCONTINUED | OUTPATIENT
Start: 2019-06-25 | End: 2019-06-25 | Stop reason: HOSPADM

## 2019-06-25 RX ORDER — ONDANSETRON 2 MG/ML
4 INJECTION INTRAMUSCULAR; INTRAVENOUS ONCE AS NEEDED
Status: COMPLETED | OUTPATIENT
Start: 2019-06-25 | End: 2019-06-25

## 2019-06-25 RX ORDER — FENTANYL CITRATE 50 UG/ML
50 INJECTION, SOLUTION INTRAMUSCULAR; INTRAVENOUS
Status: DISCONTINUED | OUTPATIENT
Start: 2019-06-25 | End: 2019-06-25 | Stop reason: HOSPADM

## 2019-06-25 RX ORDER — PROPOFOL 10 MG/ML
VIAL (ML) INTRAVENOUS CONTINUOUS PRN
Status: DISCONTINUED | OUTPATIENT
Start: 2019-06-25 | End: 2019-06-25 | Stop reason: SURG

## 2019-06-25 RX ORDER — LIDOCAINE HYDROCHLORIDE 20 MG/ML
INJECTION, SOLUTION INFILTRATION; PERINEURAL AS NEEDED
Status: DISCONTINUED | OUTPATIENT
Start: 2019-06-25 | End: 2019-06-25 | Stop reason: SURG

## 2019-06-25 RX ORDER — PROMETHAZINE HYDROCHLORIDE 25 MG/ML
6.25 INJECTION, SOLUTION INTRAMUSCULAR; INTRAVENOUS
Status: DISCONTINUED | OUTPATIENT
Start: 2019-06-25 | End: 2019-06-25 | Stop reason: HOSPADM

## 2019-06-25 RX ORDER — ASPIRIN 81 MG/1
81 TABLET ORAL DAILY
COMMUNITY

## 2019-06-25 RX ORDER — PROPOFOL 10 MG/ML
VIAL (ML) INTRAVENOUS AS NEEDED
Status: DISCONTINUED | OUTPATIENT
Start: 2019-06-25 | End: 2019-06-25 | Stop reason: SURG

## 2019-06-25 RX ORDER — FENTANYL CITRATE 50 UG/ML
INJECTION, SOLUTION INTRAMUSCULAR; INTRAVENOUS AS NEEDED
Status: DISCONTINUED | OUTPATIENT
Start: 2019-06-25 | End: 2019-06-25 | Stop reason: SURG

## 2019-06-25 RX ORDER — MAGNESIUM HYDROXIDE 1200 MG/15ML
LIQUID ORAL AS NEEDED
Status: DISCONTINUED | OUTPATIENT
Start: 2019-06-25 | End: 2019-06-25 | Stop reason: HOSPADM

## 2019-06-25 RX ORDER — PROTAMINE SULFATE 10 MG/ML
INJECTION, SOLUTION INTRAVENOUS AS NEEDED
Status: DISCONTINUED | OUTPATIENT
Start: 2019-06-25 | End: 2019-06-25 | Stop reason: SURG

## 2019-06-25 RX ORDER — OXYCODONE AND ACETAMINOPHEN 7.5; 325 MG/1; MG/1
1 TABLET ORAL ONCE AS NEEDED
Status: DISCONTINUED | OUTPATIENT
Start: 2019-06-25 | End: 2019-06-25 | Stop reason: HOSPADM

## 2019-06-25 RX ORDER — ACETAMINOPHEN 325 MG/1
650 TABLET ORAL ONCE AS NEEDED
Status: DISCONTINUED | OUTPATIENT
Start: 2019-06-25 | End: 2019-06-25 | Stop reason: HOSPADM

## 2019-06-25 RX ORDER — PROMETHAZINE HYDROCHLORIDE 25 MG/1
25 SUPPOSITORY RECTAL ONCE AS NEEDED
Status: DISCONTINUED | OUTPATIENT
Start: 2019-06-25 | End: 2019-06-25 | Stop reason: HOSPADM

## 2019-06-25 RX ORDER — HYDROCODONE BITARTRATE AND ACETAMINOPHEN 5; 325 MG/1; MG/1
1-2 TABLET ORAL EVERY 6 HOURS PRN
Qty: 20 TABLET | Refills: 0 | Status: SHIPPED | OUTPATIENT
Start: 2019-06-25 | End: 2020-01-23 | Stop reason: ALTCHOICE

## 2019-06-25 RX ORDER — EPHEDRINE SULFATE 50 MG/ML
5 INJECTION, SOLUTION INTRAVENOUS ONCE AS NEEDED
Status: DISCONTINUED | OUTPATIENT
Start: 2019-06-25 | End: 2019-06-25 | Stop reason: HOSPADM

## 2019-06-25 RX ORDER — CLINDAMYCIN PHOSPHATE 900 MG/50ML
900 INJECTION INTRAVENOUS ONCE
Status: COMPLETED | OUTPATIENT
Start: 2019-06-25 | End: 2019-06-25

## 2019-06-25 RX ORDER — PROMETHAZINE HYDROCHLORIDE 25 MG/ML
12.5 INJECTION, SOLUTION INTRAMUSCULAR; INTRAVENOUS ONCE AS NEEDED
Status: DISCONTINUED | OUTPATIENT
Start: 2019-06-25 | End: 2019-06-25 | Stop reason: HOSPADM

## 2019-06-25 RX ORDER — SODIUM CHLORIDE 0.9 % (FLUSH) 0.9 %
1-10 SYRINGE (ML) INJECTION AS NEEDED
Status: DISCONTINUED | OUTPATIENT
Start: 2019-06-25 | End: 2019-06-25 | Stop reason: HOSPADM

## 2019-06-25 RX ORDER — FAMOTIDINE 10 MG/ML
20 INJECTION, SOLUTION INTRAVENOUS ONCE
Status: COMPLETED | OUTPATIENT
Start: 2019-06-25 | End: 2019-06-25

## 2019-06-25 RX ORDER — HYDROMORPHONE HYDROCHLORIDE 1 MG/ML
0.5 INJECTION, SOLUTION INTRAMUSCULAR; INTRAVENOUS; SUBCUTANEOUS
Status: DISCONTINUED | OUTPATIENT
Start: 2019-06-25 | End: 2019-06-25 | Stop reason: HOSPADM

## 2019-06-25 RX ORDER — LIDOCAINE HYDROCHLORIDE 10 MG/ML
0.5 INJECTION, SOLUTION EPIDURAL; INFILTRATION; INTRACAUDAL; PERINEURAL ONCE AS NEEDED
Status: DISCONTINUED | OUTPATIENT
Start: 2019-06-25 | End: 2019-06-25 | Stop reason: HOSPADM

## 2019-06-25 RX ORDER — LABETALOL HYDROCHLORIDE 5 MG/ML
5 INJECTION, SOLUTION INTRAVENOUS
Status: DISCONTINUED | OUTPATIENT
Start: 2019-06-25 | End: 2019-06-25 | Stop reason: HOSPADM

## 2019-06-25 RX ORDER — DIPHENHYDRAMINE HCL 25 MG
25 CAPSULE ORAL
Status: DISCONTINUED | OUTPATIENT
Start: 2019-06-25 | End: 2019-06-25 | Stop reason: HOSPADM

## 2019-06-25 RX ORDER — MIDAZOLAM HYDROCHLORIDE 1 MG/ML
2 INJECTION INTRAMUSCULAR; INTRAVENOUS
Status: DISCONTINUED | OUTPATIENT
Start: 2019-06-25 | End: 2019-06-25 | Stop reason: HOSPADM

## 2019-06-25 RX ADMIN — PROPOFOL 50 MG: 10 INJECTION, EMULSION INTRAVENOUS at 07:42

## 2019-06-25 RX ADMIN — SODIUM CHLORIDE, POTASSIUM CHLORIDE, SODIUM LACTATE AND CALCIUM CHLORIDE: 600; 310; 30; 20 INJECTION, SOLUTION INTRAVENOUS at 10:20

## 2019-06-25 RX ADMIN — PHENYLEPHRINE HYDROCHLORIDE 100 MCG: 10 INJECTION INTRAVENOUS at 08:07

## 2019-06-25 RX ADMIN — PROPOFOL 100 MCG/KG/MIN: 10 INJECTION, EMULSION INTRAVENOUS at 07:42

## 2019-06-25 RX ADMIN — ONDANSETRON 4 MG: 2 INJECTION INTRAMUSCULAR; INTRAVENOUS at 10:49

## 2019-06-25 RX ADMIN — PHENYLEPHRINE HYDROCHLORIDE 100 MCG: 10 INJECTION INTRAVENOUS at 08:58

## 2019-06-25 RX ADMIN — FAMOTIDINE 20 MG: 10 INJECTION INTRAVENOUS at 07:24

## 2019-06-25 RX ADMIN — PHENYLEPHRINE HYDROCHLORIDE 100 MCG: 10 INJECTION INTRAVENOUS at 08:30

## 2019-06-25 RX ADMIN — IOPAMIDOL 135 ML: 510 INJECTION, SOLUTION INTRAVASCULAR at 10:20

## 2019-06-25 RX ADMIN — EPHEDRINE SULFATE 20 MG: 50 INJECTION INTRAMUSCULAR; INTRAVENOUS; SUBCUTANEOUS at 07:51

## 2019-06-25 RX ADMIN — Medication 0.5 MG: at 07:39

## 2019-06-25 RX ADMIN — PHENYLEPHRINE HYDROCHLORIDE 100 MCG: 10 INJECTION INTRAVENOUS at 08:19

## 2019-06-25 RX ADMIN — PHENYLEPHRINE HYDROCHLORIDE 100 MCG: 10 INJECTION INTRAVENOUS at 08:56

## 2019-06-25 RX ADMIN — GLYCOPYRROLATE 0.2 MCG: 0.2 INJECTION INTRAMUSCULAR; INTRAVENOUS at 08:06

## 2019-06-25 RX ADMIN — PHENYLEPHRINE HYDROCHLORIDE 100 MCG: 10 INJECTION INTRAVENOUS at 09:57

## 2019-06-25 RX ADMIN — PROTAMINE SULFATE 30 MG: 10 INJECTION, SOLUTION INTRAVENOUS at 10:00

## 2019-06-25 RX ADMIN — PHENYLEPHRINE HYDROCHLORIDE 100 MCG: 10 INJECTION INTRAVENOUS at 09:16

## 2019-06-25 RX ADMIN — SODIUM CHLORIDE, POTASSIUM CHLORIDE, SODIUM LACTATE AND CALCIUM CHLORIDE: 600; 310; 30; 20 INJECTION, SOLUTION INTRAVENOUS at 10:03

## 2019-06-25 RX ADMIN — MIDAZOLAM 1 MG: 1 INJECTION INTRAMUSCULAR; INTRAVENOUS at 07:24

## 2019-06-25 RX ADMIN — PHENYLEPHRINE HYDROCHLORIDE 100 MCG: 10 INJECTION INTRAVENOUS at 08:51

## 2019-06-25 RX ADMIN — CLINDAMYCIN PHOSPHATE 900 MG: 900 INJECTION INTRAVENOUS at 07:42

## 2019-06-25 RX ADMIN — HEPARIN SODIUM 10000 UNITS: 1000 INJECTION, SOLUTION INTRAVENOUS; SUBCUTANEOUS at 08:26

## 2019-06-25 RX ADMIN — FENTANYL CITRATE 25 MCG: 50 INJECTION INTRAMUSCULAR; INTRAVENOUS at 07:59

## 2019-06-25 RX ADMIN — PROPOFOL 100 MG: 10 INJECTION, EMULSION INTRAVENOUS at 07:54

## 2019-06-25 RX ADMIN — EPHEDRINE SULFATE 10 MG: 50 INJECTION INTRAMUSCULAR; INTRAVENOUS; SUBCUTANEOUS at 08:00

## 2019-06-25 RX ADMIN — LIDOCAINE HYDROCHLORIDE 60 MG: 20 INJECTION, SOLUTION INFILTRATION; PERINEURAL at 07:41

## 2019-06-25 RX ADMIN — EPHEDRINE SULFATE 20 MG: 50 INJECTION INTRAMUSCULAR; INTRAVENOUS; SUBCUTANEOUS at 08:06

## 2019-06-25 RX ADMIN — METOPROLOL TARTRATE 25 MG: 25 TABLET ORAL at 07:23

## 2019-06-25 NOTE — ANESTHESIA PREPROCEDURE EVALUATION
Anesthesia Evaluation                  Airway   Mallampati: I  TM distance: >3 FB  Neck ROM: full  No difficulty expected  Dental    (+) edentulous    Pulmonary    (+) a smoker Former, COPD, decreased breath sounds,   Cardiovascular - normal exam    PT is on anticoagulation therapy  Patient on routine beta blocker and Beta blocker given within 24 hours of surgery    (+) hypertension, CAD, CABG, angina, CHF, PVD, hyperlipidemia,       Neuro/Psych  (+) headaches, psychiatric history,     GI/Hepatic/Renal/Endo    (+) obesity, morbid obesity, GERD,      Musculoskeletal     Abdominal    Substance History      OB/GYN          Other   (+) arthritis                     Anesthesia Plan    ASA 4     MAC     intravenous induction   Anesthetic plan, all risks, benefits, and alternatives have been provided, discussed and informed consent has been obtained with: patient.

## 2019-06-25 NOTE — BRIEF OP NOTE
BRACHIAL APPROACH  Progress Note    Shaneka Guido  6/25/2019    Pre-op Diagnosis:   Failing ABF graft       Post-Op Diagnosis Codes:   same    Procedure/CPT® Codes:      Procedure(s):  RIGHT BRACHIAL CUTDOWN AND BILATERAL FEMORAL SHOCKWAVE ANGIOPLASTY    Surgeon(s):  Shashank Landin MD    Anesthesia: Monitor Anesthesia Care    Staff:   Circulator: Bao Epperson RN  Scrub Person: Alexander Kingston  Assistant: Remigio Roman  Orientee: Sammie Marion  Vascular Radiology Technician: Caren Coles Terri    Estimated Blood Loss: minimal    Urine Voided: * No values recorded between 6/25/2019  7:33 AM and 6/25/2019 10:23 AM *    Specimens:                None          Drains:      Findings: see dict    Complications: none      Shashank Landin MD     Date: 6/25/2019  Time: 10:27 AM

## 2019-06-25 NOTE — DISCHARGE INSTRUCTIONS
Outpatient Surgery Guidelines, Adult  Outpatient procedures are those for which the person having the procedure is allowed to go home the same day as the procedure. Various procedures are done on an outpatient basis. You should follow some general guidelines if you will be having an outpatient procedure.  AFTER THE  PROCEDURE  After surgery, you will be taken to a recovery area, where your progress will be monitored. If there are no complications, you will be allowed to go home when you are awake, stable, and taking fluids well. You may have numbness around the surgical site. Healing will take some time. You will have tenderness at the surgical site and may have some swelling and bruising. You may also have some nausea.  HOME CARE INSTRUCTIONS  · Do not drive for 24 hours, or as directed by your health care provider. Do not drive while taking prescription pain medicines.  · Do not drink alcohol for 24 hours.  · Do not make important decisions or sign legal documents for 24 hours.  · Plan on having a responsible adult stay with your for 24 hours following your procedure.  · You may resume a normal diet and activities as directed.  · Do not lift anything heavier than 10 pounds (4.5 kg) or play contact sports until your health care provider says it is okay.  · Only take over-the-counter or prescription medicines as directed by your health care provider.  · Follow up with your health care provider as directed.  · If you have sleep apnea, surgery and certain medicines can increase your risk for breathing problems. Follow instructions from your health care provider about wearing your sleep device:  ? Anytime you are sleeping, including during daytime naps.  ? While taking prescription pain medicines, sleeping medicines, or medicines that make you drowsy.  ·   SEEK MEDICAL CARE IF:  · You have increased bleeding (more than a small spot) from the surgical site.  · You have redness, swelling, or increasing pain in the  wound.  · You see pus coming from the wound.  · You have a fever > 101.  · You notice a bad smell coming from the wound or dressing.  · You feel lightheaded or faint.  · You develop a rash.  · You have trouble breathing.  · You develop allergies.  MAKE SURE YOU:  · Understand these instructions.  · Will watch your condition.  · Will get help right away if you are not doing well or get worse.

## 2019-06-25 NOTE — ANESTHESIA POSTPROCEDURE EVALUATION
Patient: Shaneka Guido    Procedure Summary     Date:  06/25/19 Room / Location:  [facility] OR 18 [facility] HYBRID OR 18/19    Anesthesia Start:  0736 Anesthesia Stop:  1028    Procedure:  RIGHT BRACHIAL CUTDOWN AND BILATERAL FEMORAL SHOCKWAVE ANGIOPLASTY (Bilateral ) Diagnosis:      Surgeon:  Shashank Landin MD Provider:  Abigail Isabel MD    Anesthesia Type:  MAC ASA Status:  4          Anesthesia Type: MAC  Last vitals  BP   117/64 (06/25/19 1230)   Temp   36.8 °C (98.3 °F) (06/25/19 1035)   Pulse   81 (06/25/19 1230)   Resp   16 (06/25/19 1230)     SpO2   94 % (06/25/19 1230)     Post Anesthesia Care and Evaluation    Patient location during evaluation: bedside  Patient participation: complete - patient participated  Level of consciousness: awake  Pain management: adequate  Airway patency: patent  Anesthetic complications: No anesthetic complications    Cardiovascular status: acceptable  Respiratory status: acceptable  Hydration status: acceptable

## 2019-06-25 NOTE — OP NOTE
Operative Note  Date of Admission:  6/25/2019  OR Date: 6/25/2019    Pre-op Diagnosis:   Failing aortobifemoral graft    Post-Op Diagnosis Codes:  Same    Procedure:   1) right brachial cutdown, aortoiliofemoral arteriogram, bilateral profunda femoris artery shockwave angioplasty and drug-eluting balloon angioplasty    Surgeon: Edwin Landin MD    Assistant: Carl JOY and they provided critical assistance during the case including suctioning, exposure, retraction, and reduction of blood loss.    Anesthesia: Monitor Anesthesia Care    Staff:   Circulator: Bao Epperson RN  Scrub Person: Alexander Kingston  Assistant: Remigio Roman  Orientee: Sammie Marion  Vascular Radiology Technician: Caren Coles Terri    Estimated Blood Loss: minimal    Specimens: * No orders in the log *     Complications: none    Findings: see dict    Indications:    The patient is an 58 y.o. female seen for evaluation of bilateral high-grade profunda femoris artery stenoses threatening aortobifemoral bypass graft bilaterally.  Patient and family understand risk benefits complications of the procedure opening these.  They agreed to the procedure.       Procedure:    The patient was prepped and draped sterilely after general anesthetic LMA was initiated.  Cutting down at the mid upper arm and exposing the brachial artery we were able to place a 5-0 Prolene pre-stitch and then accessed with a micro puncture needle.  Passing a wire centrally we are able to position wire and catheters down into the left aortobifemoral limb.  6 Lao 90 cm sheath was positioned and then angiogram of the left leg iliac and femoral vessels was performed.  Showing high-grade stenosis of the main profunda femoris takeoff on steep Ethiopian oblique views we are able to cross this lesion.  5 mm x 6 cm shockwave balloon angioplasty was performed with a total of 180 pulses varying between 2 and 4 steph of pressure.  Following this a 5 mm  impact Admiral drug-eluting balloon was used to angioplasty the lesion as well.  After completion angiograms confirmed market provement and catheters were pulled up to the aorta.  Aortogram was performed above the renals.  We were able to then access the right iliac limb of the aortobifem passing wire safely through high-grade stenosis at the profunda femoris origin stenosis on the side.  Steep KHAN view was obtained and 5.5 mm shockwave balloon angioplasty was performed with 180 pulses varying between 2 and 4 steph as well.  This was followed by a 6 mm drug-eluting balloon angioplasty.  All drug-eluting balloons were angioplastied to 3 minutes at 8 steph.  Following this final angiograms were performed and then catheters were removed and closure of the artery with 5-0 Prolene was performed.  Hemostasis was assured in the arm with reversal of the heparin and FloSeal placed.  Closure of the deep tissue with 3-0 Vicryl followed by closure of the skin with 4-0 Vicryl subcuticular closure and skin glue was performed.  Patient tolerated the procedure well and there were no immediate complications seen.    Radiographic Findings:  Angiographic findings show widely patent right renal artery.  Left renal artery has an 80% non-orificial stenosis.  Both nephrograms feel reasonably well.  The aorta has atherosclerosis but wide patency down to an aortobifemoral graft is in the side in nature.  Shortly below the inside anastomosis however the aorta occludes.  Both iliac systems are totally occluded in the native vessels.  Both iliac iliac limbs of the aortobifem and aortic anastomosis are widely patent.  Both iliac limb anastomoses to the common femorals are widely patent and actually patulous.  Both  superficial femoral arteries are occluded just beyond their origin.  Both profunda femoris arteries are large and dominant outflow segments for these grafts.  On the left side there are multiple branches with the main branch having an  80% outflow stenosis.  On the right side there is only 2 outflow branches with the main one having a 70% outflow stenosis.  Both main profunda branch stenoses are orificial in nature and treated with the above-noted shockwave balloon angioplasties followed by dilute drug-eluting balloon angioplasties.  Following the 5 mm treatment on the left there is less than a 20% residual stenosis.  On the right side following a 5.5 and a 6 mm treatment with balloons there is a less than 30% residual stenosis.  No complicating features or dissections are seen on follow-up angiograms.      There are no hospital problems to display for this patient.     Shashank Landin MD     Date: 6/25/2019  Time: 5:56 PM

## 2019-07-01 ENCOUNTER — TELEPHONE (OUTPATIENT)
Dept: CARDIOLOGY | Facility: CLINIC | Age: 59
End: 2019-07-01

## 2019-07-01 NOTE — TELEPHONE ENCOUNTER
Pt left a vm that she would like to discuss the new medication she was put on.  She reports that she was in the hospital recently.   I called pt back to discuss, but her vm is full.

## 2019-07-08 NOTE — TELEPHONE ENCOUNTER
Pt reports that since starting the Ranexa, she gradually started getting nauseated and dizzy over the weeks.  She has been taking since the end of April.  The symptoms didn't start as soon as she started the med, but the symptoms developed day after day.  Last Friday she couldn't get out of the bed and her body was numb and weak.  She went to the ED, [facility] in [facility], when she arrived her bp 228/99.  She was released, but on Sat she did take the med and  had the same symptoms that started 30 mins after taking her first dose.  Pt has not taken the med since then and has not had any problems.      Since stopping she has not had any cp, soa,.  Do you want to try and alternative treatment?       Note: pt has an appt 08/05/19.

## 2019-07-09 RX ORDER — AMLODIPINE BESYLATE 5 MG/1
5 TABLET ORAL DAILY
Qty: 30 TABLET | Refills: 3 | Status: SHIPPED | OUTPATIENT
Start: 2019-07-09 | End: 2020-01-23 | Stop reason: SDUPTHER

## 2019-07-10 NOTE — TELEPHONE ENCOUNTER
I called and informed pt, she verbalized understanding and will keep Dr. Owen updated with any adverse side effects and bp .

## 2019-07-25 ENCOUNTER — TRANSCRIBE ORDERS (OUTPATIENT)
Dept: ADMINISTRATIVE | Facility: HOSPITAL | Age: 59
End: 2019-07-25

## 2019-07-25 DIAGNOSIS — I73.9 CLAUDICATION (HCC): Primary | ICD-10-CM

## 2019-08-02 ENCOUNTER — HOSPITAL ENCOUNTER (OUTPATIENT)
Dept: CT IMAGING | Facility: HOSPITAL | Age: 59
Discharge: HOME | End: 2019-08-02

## 2019-08-02 ENCOUNTER — HOSPITAL ENCOUNTER (OUTPATIENT)
Dept: CARDIOLOGY | Facility: HOSPITAL | Age: 59
Discharge: HOME | End: 2019-08-02
Admitting: SURGERY

## 2019-08-02 DIAGNOSIS — I73.9 CLAUDICATION (HCC): ICD-10-CM

## 2019-08-02 LAB
BH CV LOWER ARTERIAL LEFT ABI RATIO: 0.87
BH CV LOWER ARTERIAL LEFT DORSALIS PEDIS SYS MAX: 125 MMHG
BH CV LOWER ARTERIAL LEFT GREAT TOE SYS MAX: 115 MMHG
BH CV LOWER ARTERIAL LEFT LOW THIGH SYS MAX: 112 MMHG
BH CV LOWER ARTERIAL LEFT POPLITEAL SYS MAX: 125 MMHG
BH CV LOWER ARTERIAL LEFT POST EX ABI RATIO: 0.85
BH CV LOWER ARTERIAL LEFT POST TIBIAL SYS MAX: 134 MMHG
BH CV LOWER ARTERIAL LEFT TBI RATIO: 0.75
BH CV LOWER ARTERIAL RIGHT ABI RATIO: 0.91
BH CV LOWER ARTERIAL RIGHT DORSALIS PEDIS SYS MAX: 140 MMHG
BH CV LOWER ARTERIAL RIGHT GREAT TOE SYS MAX: 127 MMHG
BH CV LOWER ARTERIAL RIGHT LOW THIGH SYS MAX: 151 MMHG
BH CV LOWER ARTERIAL RIGHT POPLITEAL SYS MAX: 144 MMHG
BH CV LOWER ARTERIAL RIGHT POST EX ABI RATIO: 0.89
BH CV LOWER ARTERIAL RIGHT POST TIBIAL SYS MAX: 132 MMHG
BH CV LOWER ARTERIAL RIGHT TBI RATIO: 0.82
CREAT BLDA-MCNC: 1.2 MG/DL (ref 0.6–1.3)
UPPER ARTERIAL LEFT ARM BRACHIAL SYS MAX: 117 MMHG
UPPER ARTERIAL RIGHT ARM BRACHIAL SYS MAX: 154 MMHG

## 2019-08-02 PROCEDURE — 82565 ASSAY OF CREATININE: CPT

## 2019-08-02 PROCEDURE — 0 IOPAMIDOL PER 1 ML: Performed by: SURGERY

## 2019-08-02 PROCEDURE — 75635 CT ANGIO ABDOMINAL ARTERIES: CPT

## 2019-08-02 PROCEDURE — 93924 LWR XTR VASC STDY BILAT: CPT

## 2019-08-02 RX ADMIN — IOPAMIDOL 100 ML: 755 INJECTION, SOLUTION INTRAVENOUS at 16:15

## 2019-08-05 ENCOUNTER — OFFICE VISIT (OUTPATIENT)
Dept: CARDIOLOGY | Facility: CLINIC | Age: 59
End: 2019-08-05

## 2019-08-05 VITALS
HEIGHT: 61 IN | HEART RATE: 62 BPM | BODY MASS INDEX: 40.48 KG/M2 | SYSTOLIC BLOOD PRESSURE: 134 MMHG | WEIGHT: 214.4 LBS | DIASTOLIC BLOOD PRESSURE: 86 MMHG

## 2019-08-05 DIAGNOSIS — I25.118 CORONARY ARTERY DISEASE OF NATIVE ARTERY OF NATIVE HEART WITH STABLE ANGINA PECTORIS (HCC): Primary | ICD-10-CM

## 2019-08-05 DIAGNOSIS — I73.9 PAD (PERIPHERAL ARTERY DISEASE) (HCC): ICD-10-CM

## 2019-08-05 DIAGNOSIS — E66.01 MORBIDLY OBESE (HCC): ICD-10-CM

## 2019-08-05 DIAGNOSIS — J44.9 CHRONIC OBSTRUCTIVE PULMONARY DISEASE, UNSPECIFIED COPD TYPE (HCC): ICD-10-CM

## 2019-08-05 DIAGNOSIS — I10 ESSENTIAL HYPERTENSION: ICD-10-CM

## 2019-08-05 PROCEDURE — 93000 ELECTROCARDIOGRAM COMPLETE: CPT | Performed by: INTERNAL MEDICINE

## 2019-08-05 PROCEDURE — 99214 OFFICE O/P EST MOD 30 MIN: CPT | Performed by: INTERNAL MEDICINE

## 2019-08-05 RX ORDER — GABAPENTIN 300 MG/1
300 CAPSULE ORAL NIGHTLY
Refills: 5 | COMMUNITY
Start: 2019-07-25 | End: 2020-01-23 | Stop reason: ALTCHOICE

## 2019-08-05 NOTE — PROGRESS NOTES
"Date of Office Visit: 2019  Encounter Provider: Demond Owen MD  Place of Service: [facility] CARDIOLOGY  Patient Name: Shaneka Guido  :1960    Chief Complaint   Patient presents with   • Coronary Artery Disease   :     HPI: Shaneka Guido is a 59 y.o. female who presents today to follow up.  She established care with me in 2019.  She has lived in many cities and even in [place], and has seen cardiologists at [place], in [place], and in [place].      All of her siblings and her mother have had severe CAD and PAD.  At the age of 35, she underwent aortobifemoral bypass.  At age 45, she woke up with an acute ischemic right leg and required urgent surgical revascularization.  She had an arterial doppler in  that reported left subclavian stenosis; she is unaware of this but does say that her blood pressure shouldn't be checked in that arm as it's \"too low.\"  She had 50-69% carotid disease in .    In , she presented with chest pain that she described as a feeling of \"ice\" in her chest.  She was found to have an acute anterior MI.  She underwent emergent BMS to the LAD and then underwent CABG the next day (details are in HPI).  Her LVEF was 50%.  She has had angiograms since then, but they've been difficult due to access issues.    An echo in  reported an LVEF of 40-45% with regional wall motion abnormalities (although it did not report which walls were abnormal) and normal valves.      When I met her, she reported recurrence of the sensation of \"ice\" in her chest. A PET stress reported a large amount of inferior ischemia.  She then underwent coronary angiography in May 2019.  The vein grafts to the OM1, OM2, Cx, and RCA were all occluded, as was the native RCA.  The LAD had mild diffuse disease and a patent stent, and the LIMA backfilled the LAD.  The proximal subclavian was totally occluded.   She was started on ranolazine but had to stop " it due to lightheadedness and fatigue. I then ordered amlodipine but she has not started it yet.     She then established care with Dr. Landin.  She underwent bilateral angioplasty of the profunda.  She had carotid studies which showed 60-70% stenosis on the right and 50-60% stenosis on the left with retrograde vertebral filling consistent with known subclavian occlusion.    She's having a lot of pain in her left leg.  Her cardiac symptoms are stable.     Past Medical History:   Diagnosis Date   • Anxiety disorder    • CAD (coronary artery disease)     anterior MI 2012, BMS to LAD, then CABG x 4 (LIMA-LAD, Y SVG-OM1-OM2, SVG-rPDA)   • Chronic bronchitis (CMS/HCC)    • Chronic constipation    • Chronic diastolic (congestive) heart failure (CMS/HCC)    • COPD (chronic obstructive pulmonary disease) (CMS/HCC)    • Eczema of face    • Esophageal stricture    • Essential hypertension    • Generalized headaches    • GERD (gastroesophageal reflux disease)    • Hemorrhoids    • History of tobacco use    • Hyperlipidemia    • Incontinence    • Leg edema, left    • On anticoagulant therapy    • Osteoarthritis    • PAD (peripheral artery disease) (CMS/HCC)    • Panic attacks    • Subclavian artery stenosis, left (CMS/HCC)     BP should not be checked in that arm       Past Surgical History:   Procedure Laterality Date   • ANGIOPLASTY     • BRONCHOSCOPY     • CARDIAC CATHETERIZATION N/A 5/8/2019    Procedure: Coronary angiography;  Surgeon: Nickie Zhu MD;  Location:  [facility] C[facility] INVASIVE LOCATION;  Service: Cardiovascular   • CARDIAC CATHETERIZATION N/A 5/8/2019    Procedure: Left heart cath;  Surgeon: Nickie Zhu MD;  Location:  [facility];  Service: Cardiovascular   • CARDIAC CATHETERIZATION N/A 5/8/2019    Procedure: Left ventriculography;  Surgeon: Nickie Zhu MD;  Location:  [facility];  Service: Cardiovascular   • CARDIAC CATHETERIZATION N/A 5/8/2019    Procedure: Bypass graft  study;  Surgeon: Nickie Zhu MD;  Location: [facility] INVASIVE LOCATION;  Service: Cardiovascular   • CHOLECYSTECTOMY     • CORONARY ARTERY BYPASS GRAFT     • HYSTERECTOMY     • LAPAROSCOPIC TUBAL LIGATION         Social History     Socioeconomic History   • Marital status: Single     Spouse name: Not on file   • Number of children: Not on file   • Years of education: Not on file   • Highest education level: Not on file   Tobacco Use   • Smoking status: Former Smoker     Years: 45.00     Last attempt to quit: 2018     Years since quittin.1   • Smokeless tobacco: Never Used   • Tobacco comment: CAFFEINE USE: SOFT DRINKS OCCASIONALLY.    Substance and Sexual Activity   • Alcohol use: No     Frequency: Never   • Drug use: No   • Sexual activity: Defer       Family History   Problem Relation Age of Onset   • Chronic bronchitis Mother    • Heart disease Mother    • Hypertension Mother    • Osteoporosis Mother    • Cancer Father    • Heart disease Father    • Diabetes Father    • Hypertension Sister    • Osteoporosis Sister    • Asthma Daughter    • Hypertension Daughter    • Osteoporosis Daughter    • Hypertension Son    • Chronic bronchitis Son    • Malig Hyperthermia Neg Hx        Review of Systems   Constitution: Positive for malaise/fatigue.   Cardiovascular: Positive for chest pain, claudication and dyspnea on exertion.   All other systems reviewed and are negative.      Allergies   Allergen Reactions   • Albuterol Other (See Comments)     Mouth blisters, throat swells   • Latex Other (See Comments)     Severe blistering, throat swells   • Penicillins Other (See Comments)     Throat swells   • Prednisone Other (See Comments)     Fluid retention.    • Influenza Vaccines Rash         Current Outpatient Medications:   •  aspirin 81 MG EC tablet, Take 81 mg by mouth Daily., Disp: , Rfl:   •  atorvastatin (LIPITOR) 20 MG tablet, Take 20 mg by mouth Daily., Disp: , Rfl:   •  clopidogrel (PLAVIX) 75 MG  "tablet, Take 75 mg by mouth Daily., Disp: , Rfl: 2  •  furosemide (LASIX) 40 MG tablet, Take 40 mg by mouth 2 (Two) Times a Day As Needed., Disp: , Rfl:   •  isosorbide mononitrate (IMDUR) 60 MG 24 hr tablet, Take 60 mg by mouth 2 (Two) Times a Day., Disp: , Rfl:   •  levalbuterol (XOPENEX HFA) 45 MCG/ACT inhaler, Inhale 1-2 puffs Every 4 (Four) Hours As Needed for Wheezing., Disp: , Rfl:   •  metoprolol tartrate (LOPRESSOR) 25 MG tablet, Take 25 mg by mouth 2 (Two) Times a Day., Disp: , Rfl: 2  •  raNITIdine (ZANTAC) 150 MG tablet, Take 150 mg by mouth Every Night., Disp: , Rfl:   •  tiotropium bromide-olodaterol (STIOLTO RESPIMAT) 2.5-2.5 MCG/ACT aerosol solution inhaler, Inhale 2 puffs Daily., Disp: , Rfl:   •  amLODIPine (NORVASC) 5 MG tablet, Take 1 tablet by mouth Daily., Disp: 30 tablet, Rfl: 3  •  gabapentin (NEURONTIN) 300 MG capsule, Take 300 mg by mouth Every Night., Disp: , Rfl: 5  •  HYDROcodone-acetaminophen (NORCO) 5-325 MG per tablet, Take 1-2 tablets by mouth Every 6 (Six) Hours As Needed (Pain)., Disp: 20 tablet, Rfl: 0      Objective:     Vitals:    08/05/19 1330   BP: 134/86   BP Location: Right arm   Pulse: 62   Weight: 97.3 kg (214 lb 6.4 oz)   Height: 154.9 cm (61\")     Body mass index is 40.51 kg/m².    Physical Exam   Constitutional: She is oriented to person, place, and time.   Obese   HENT:   Head: Normocephalic.   Nose: Nose normal.   Mouth/Throat: Oropharynx is clear and moist. She has dentures.   Eyes: Conjunctivae and EOM are normal. Pupils are equal, round, and reactive to light.   Neck: Normal range of motion. No JVD present.   Cannot assess for JVD due to habitus   Cardiovascular: Normal rate, regular rhythm and normal heart sounds. Exam reveals decreased pulses.   No murmur heard.  Pulmonary/Chest: Effort normal.   Abdominal: Soft. There is no tenderness.   Obesity limits abdominal exam   Musculoskeletal: Normal range of motion. She exhibits no edema.   Neurological: She is alert " and oriented to person, place, and time. No cranial nerve deficit.   Skin: Skin is warm and dry. No rash noted.   Psychiatric: She has a normal mood and affect. Her behavior is normal. Judgment and thought content normal.   Vitals reviewed.        ECG 12 Lead  Date/Time: 8/5/2019 1:38 PM  Performed by: Demond Owen MD  Authorized by: Demond Owen MD   Comparison: compared with previous ECG   Similar to previous ECG  Rhythm: sinus rhythm  Conduction: conduction normal  ST Flattening: all  Other findings: non-specific ST-T wave changes    Clinical impression: abnormal EKG              Assessment:       Diagnosis Plan   1. Coronary artery disease of native artery of native heart with stable angina pectoris (CMS/[facility])     2. PAD (peripheral artery disease) (CMS/[facility])     3. Essential hypertension     4. Morbidly obese (CMS/[facility])            Plan:       1.  Coronary Artery Disease  Assessment  • The patient has CCS class I - angina only during strenuous or prolonged physical activity  • She's on clopidogrel and aspirin due to to PAD, and is on atorvastatin.  She feels that her symptoms are stable.  She's on metoprolol and ISMN.  All of her grafts are occluded to her RCA/LCx as are the native vessels; she is living off a LIMA and LAD.    Subjective - Objective  • There is a history of past MI 2012  • There is a history of previous coronary artery bypass graft 2012  • There has been a previous stent procedure using BMS 2012  • Current antiplatelet therapy includes clopidogrel 75 mg    2.  She has complex vascular disease which is followed by Dr. Landin.     3.  This is within goal.  She is extremely sensitive to medication.  She prefers not to start amlodipine.     Sincerely,       Demond Owen MD

## 2020-01-23 ENCOUNTER — OFFICE VISIT (OUTPATIENT)
Dept: CARDIOLOGY | Facility: CLINIC | Age: 60
End: 2020-01-23

## 2020-01-23 VITALS
WEIGHT: 213.8 LBS | HEIGHT: 61 IN | HEART RATE: 73 BPM | SYSTOLIC BLOOD PRESSURE: 160 MMHG | BODY MASS INDEX: 40.37 KG/M2 | DIASTOLIC BLOOD PRESSURE: 80 MMHG | OXYGEN SATURATION: 98 %

## 2020-01-23 DIAGNOSIS — I25.118 CORONARY ARTERY DISEASE OF NATIVE ARTERY OF NATIVE HEART WITH STABLE ANGINA PECTORIS (HCC): Primary | ICD-10-CM

## 2020-01-23 DIAGNOSIS — I10 ESSENTIAL HYPERTENSION: ICD-10-CM

## 2020-01-23 DIAGNOSIS — E66.01 MORBIDLY OBESE (HCC): ICD-10-CM

## 2020-01-23 DIAGNOSIS — I73.9 PAD (PERIPHERAL ARTERY DISEASE) (HCC): ICD-10-CM

## 2020-01-23 PROCEDURE — 99214 OFFICE O/P EST MOD 30 MIN: CPT | Performed by: INTERNAL MEDICINE

## 2020-01-23 PROCEDURE — 93000 ELECTROCARDIOGRAM COMPLETE: CPT | Performed by: INTERNAL MEDICINE

## 2020-01-23 RX ORDER — AMLODIPINE BESYLATE 2.5 MG/1
2.5 TABLET ORAL DAILY
Qty: 30 TABLET | Refills: 3 | Status: SHIPPED | OUTPATIENT
Start: 2020-01-23 | End: 2020-07-23

## 2020-01-23 RX ORDER — OMEPRAZOLE 40 MG/1
40 CAPSULE, DELAYED RELEASE ORAL DAILY
COMMUNITY
Start: 2020-01-14 | End: 2021-03-31

## 2020-01-23 NOTE — PROGRESS NOTES
"Date of Office Visit: 2020  Encounter Provider: Demond Owen MD  Place of Service: [facility] CARDIOLOGY  Patient Name: Shaneka Guido  :1960    Chief Complaint   Patient presents with   • Coronary Artery Disease   :     HPI: Shaneka Guido is a 59 y.o. female who presents today to follow up.  She established care with me in 2019.  She has lived in many cities and even in [place], and has seen cardiologists at [place], in [place], and in [place].      All of her siblings and her mother have had severe CAD and PAD.  At the age of 35, she underwent aortobifemoral bypass.  At age 45, she woke up with an acute ischemic right leg and required urgent surgical revascularization.  She had an arterial doppler in  that reported left subclavian stenosis; she was unaware of this diagnosis, but did say that her blood pressure shouldn't be checked in that arm as it's \"too low.\"  She has a history of carotid arterial disease as well .    In , she presented with chest pain that she described as a feeling of \"ice\" in her chest.  She was found to have an acute anterior MI.  She underwent emergent BMS to the LAD and then underwent CABG the next day (details are in HPI).  Her LVEF was 50%.  She has had angiograms since then, but they've been difficult due to access issues.    An echo in  reported an LVEF of 40-45% with regional wall motion abnormalities (although it did not report which walls were abnormal) and normal valves.      When I met her, she reported recurrence of the sensation of \"ice\" in her chest. A PET stress reported a large amount of inferior ischemia.  She then underwent coronary angiography in May 2019.  The vein grafts to the OM1, OM2, Cx, and RCA were all occluded, as was the native RCA.  The LAD had mild diffuse disease and a patent stent, and the LIMA backfilled the LAD.  The proximal subclavian was totally occluded.   She was started on " ranolazine but had to stop it due to lightheadedness and fatigue. I then ordered amlodipine but she opted not to take it.     She then established care with Dr. Landin.  She underwent bilateral angioplasty of the profunda.  She had carotid studies which showed 60-70% stenosis on the right and 50-60% stenosis on the left with retrograde vertebral filling consistent with known subclavian occlusion.    Her cardiac symptoms are stable and her claudication has improved.     Past Medical History:   Diagnosis Date   • Anxiety disorder    • CAD (coronary artery disease)     anterior MI 2012, BMS to LAD, then CABG x 4 (LIMA-LAD, Y SVG-OM1-OM2, SVG-rPDA)   • Chronic bronchitis (CMS/HCC)    • Chronic constipation    • Chronic diastolic (congestive) heart failure (CMS/HCC)    • COPD (chronic obstructive pulmonary disease) (CMS/HCC)    • Eczema of face    • Esophageal stricture    • Essential hypertension    • Generalized headaches    • GERD (gastroesophageal reflux disease)    • Hemorrhoids    • History of tobacco use    • Hyperlipidemia    • Incontinence    • Leg edema, left    • On anticoagulant therapy    • Osteoarthritis    • PAD (peripheral artery disease) (CMS/HCC)    • Panic attacks    • Subclavian artery stenosis, left (CMS/HCC)     BP should not be checked in that arm       Past Surgical History:   Procedure Laterality Date   • ANGIOPLASTY     • BRONCHOSCOPY     • CARDIAC CATHETERIZATION N/A 5/8/2019    Procedure: Coronary angiography;  Surgeon: Nickie Zhu MD;  Location:  [facility] CATH INVASIVE LOCATION;  Service: Cardiovascular   • CARDIAC CATHETERIZATION N/A 5/8/2019    Procedure: Left heart cath;  Surgeon: Nickie Zhu MD;  [facility];  Service: Cardiovascular   • CARDIAC CATHETERIZATION N/A 5/8/2019    Procedure: Left ventriculography;  Surgeon: Nickie Zhu MD;  Location:  [facility] CATH INVASIVE LOCATION;  Service: Cardiovascular   • CARDIAC CATHETERIZATION N/A 5/8/2019    Procedure:  Bypass graft study;  Surgeon: Nickie Zhu MD;  Location: [facility] INVASIVE LOCATION;  Service: Cardiovascular   • CHOLECYSTECTOMY     • CORONARY ARTERY BYPASS GRAFT     • HYSTERECTOMY     • LAPAROSCOPIC TUBAL LIGATION         Social History     Socioeconomic History   • Marital status: Single     Spouse name: Not on file   • Number of children: Not on file   • Years of education: Not on file   • Highest education level: Not on file   Tobacco Use   • Smoking status: Former Smoker     Years: 45.00     Last attempt to quit: 2018     Years since quittin.6   • Smokeless tobacco: Never Used   • Tobacco comment: CAFFEINE USE: SOFT DRINKS OCCASIONALLY.    Substance and Sexual Activity   • Alcohol use: No     Frequency: Never   • Drug use: No   • Sexual activity: Defer       Family History   Problem Relation Age of Onset   • Chronic bronchitis Mother    • Heart disease Mother    • Hypertension Mother    • Osteoporosis Mother    • Cancer Father    • Heart disease Father    • Diabetes Father    • Hypertension Sister    • Osteoporosis Sister    • Asthma Daughter    • Hypertension Daughter    • Osteoporosis Daughter    • Hypertension Son    • Chronic bronchitis Son    • Malig Hyperthermia Neg Hx        Review of Systems   Constitution: Positive for malaise/fatigue.   Cardiovascular: Positive for claudication, dyspnea on exertion and leg swelling.   Respiratory: Positive for shortness of breath.    Endocrine: Positive for cold intolerance and heat intolerance.   Musculoskeletal: Positive for joint pain and myalgias.   Gastrointestinal: Positive for nausea.   Neurological: Positive for excessive daytime sleepiness, dizziness and headaches.   Psychiatric/Behavioral: Positive for depression. The patient is nervous/anxious.    All other systems reviewed and are negative.      Allergies   Allergen Reactions   • Albuterol Other (See Comments)     Mouth blisters, throat swells   • Latex Other (See Comments)     Severe  "blistering, throat swells   • Penicillins Other (See Comments)     Throat swells   • Prednisone Other (See Comments)     Fluid retention.    • Influenza Vaccines Rash         Current Outpatient Medications:   •  aspirin 81 MG EC tablet, Take 81 mg by mouth Daily., Disp: , Rfl:   •  atorvastatin (LIPITOR) 20 MG tablet, Take 20 mg by mouth Daily., Disp: , Rfl:   •  clopidogrel (PLAVIX) 75 MG tablet, Take 75 mg by mouth Daily., Disp: , Rfl: 2  •  furosemide (LASIX) 40 MG tablet, Take 40 mg by mouth As Needed., Disp: , Rfl:   •  isosorbide mononitrate (IMDUR) 60 MG 24 hr tablet, Take 60 mg by mouth 2 (Two) Times a Day., Disp: , Rfl:   •  levalbuterol (XOPENEX HFA) 45 MCG/ACT inhaler, Inhale 1-2 puffs Every 4 (Four) Hours As Needed for Wheezing., Disp: , Rfl:   •  metoprolol tartrate (LOPRESSOR) 25 MG tablet, Take 25 mg by mouth 2 (Two) Times a Day., Disp: , Rfl: 2  •  tiotropium bromide-olodaterol (STIOLTO RESPIMAT) 2.5-2.5 MCG/ACT aerosol solution inhaler, Inhale 2 puffs Daily., Disp: , Rfl:   •  amLODIPine (NORVASC) 5 MG tablet, Take 1 tablet by mouth Daily., Disp: 30 tablet, Rfl: 3  •  omeprazole (priLOSEC) 40 MG capsule, Take 40 mg by mouth Daily., Disp: , Rfl:       Objective:     Vitals:    01/23/20 1213   BP: 160/80   BP Location: Right arm   Pulse: 73   SpO2: 98%   Weight: 97 kg (213 lb 12.8 oz)   Height: 154.9 cm (61\")     Body mass index is 40.4 kg/m².    Physical Exam   Constitutional: She is oriented to person, place, and time.   Obese   HENT:   Head: Normocephalic.   Nose: Nose normal.   Mouth/Throat: Oropharynx is clear and moist. Abnormal dentition.   Eyes: Pupils are equal, round, and reactive to light. Conjunctivae and EOM are normal.   Neck: Normal range of motion. No JVD present.   Cannot assess for JVD due to habitus   Cardiovascular: Normal rate, regular rhythm and normal heart sounds. Exam reveals decreased pulses.   No murmur heard.  Pulmonary/Chest: Effort normal.   Abdominal: Soft. There is no " tenderness.   Obesity limits abdominal exam   Musculoskeletal: Normal range of motion. She exhibits no edema.   Neurological: She is alert and oriented to person, place, and time. No cranial nerve deficit.   Skin: Skin is warm and dry. No rash noted.   Psychiatric: She has a normal mood and affect. Her behavior is normal. Judgment and thought content normal.   Vitals reviewed.        ECG 12 Lead  Date/Time: 1/23/2020 12:55 PM  Performed by: Demond Owen MD  Authorized by: Demond Owen MD   Comparison: compared with previous ECG   Similar to previous ECG  Rhythm: sinus rhythm  Conduction: conduction normal  ST Flattening: I, aVL, II, III, aVF and V6  T inversion: V6  T flattening: aVL  QRS axis: normal  Other findings: left ventricular hypertrophy with strain    Clinical impression: abnormal EKG              Assessment:       Diagnosis Plan   1. Coronary artery disease of native artery of native heart with stable angina pectoris (CMS/[facility])     2. PAD (peripheral artery disease) (CMS/[facility])     3. Essential hypertension     4. Morbidly obese (CMS/[facility])            Plan:       1.  Coronary Artery Disease  Assessment  • The patient has CCS class I - angina only during strenuous or prolonged physical activity  • She's on clopidogrel and aspirin due to to PAD, and is on atorvastatin.  She feels that her symptoms are stable.  She's on metoprolol and ISMN.  All of her grafts are occluded to her RCA/LCx as are the native vessels; she is living off a LIMA that is supplied by an occluded left subclavian (so she's stealing from her vertebrals), but her native LAD is patent as well.   She does not tolerate ranolazine.     Subjective - Objective  • There is a history of past MI 2012  • There is a history of previous coronary artery bypass graft 2012  • There has been a previous stent procedure using BMS 2012  • Current antiplatelet therapy includes aspirin 81 mg and clopidogrel 75 mg    2.  She has complex vascular disease which  is followed by Dr. Landin. She's on DAPT.  She reportedly has renal artery stenosis on the left; I will try to get those records.    3.  This is not within goal. Her average BP at home is 150/80.  She is extremely sensitive to medication; I had ordered amlodipine but she didn't want to start it.  She is amenable to starting a half dose, 2.5mg daily.     Sincerely,       Demond Owen MD

## 2020-07-23 ENCOUNTER — OFFICE VISIT (OUTPATIENT)
Dept: CARDIOLOGY | Facility: CLINIC | Age: 60
End: 2020-07-23

## 2020-07-23 VITALS
HEART RATE: 76 BPM | BODY MASS INDEX: 39.46 KG/M2 | DIASTOLIC BLOOD PRESSURE: 82 MMHG | WEIGHT: 209 LBS | HEIGHT: 61 IN | SYSTOLIC BLOOD PRESSURE: 142 MMHG

## 2020-07-23 DIAGNOSIS — I25.118 CORONARY ARTERY DISEASE OF NATIVE ARTERY OF NATIVE HEART WITH STABLE ANGINA PECTORIS (HCC): Primary | ICD-10-CM

## 2020-07-23 DIAGNOSIS — I73.9 PAD (PERIPHERAL ARTERY DISEASE) (HCC): ICD-10-CM

## 2020-07-23 DIAGNOSIS — E66.01 MORBIDLY OBESE (HCC): ICD-10-CM

## 2020-07-23 DIAGNOSIS — I10 ESSENTIAL HYPERTENSION: ICD-10-CM

## 2020-07-23 DIAGNOSIS — F43.10 PTSD (POST-TRAUMATIC STRESS DISORDER): ICD-10-CM

## 2020-07-23 PROCEDURE — 93000 ELECTROCARDIOGRAM COMPLETE: CPT | Performed by: NURSE PRACTITIONER

## 2020-07-23 PROCEDURE — 99214 OFFICE O/P EST MOD 30 MIN: CPT | Performed by: NURSE PRACTITIONER

## 2020-07-23 RX ORDER — LEVALBUTEROL INHALATION SOLUTION 1.25 MG/3ML
1 SOLUTION RESPIRATORY (INHALATION) EVERY 4 HOURS PRN
COMMUNITY

## 2020-07-23 NOTE — PROGRESS NOTES
"  Date of Office Visit: 2020  Encounter Provider: CLIFFORD Keys  Place of Service: [facility] CARDIOLOGY  Patient Name: Shaneka Guido  :1960  Primary Cardiologist: Dr. Demond Owen    Chief Complaint   Patient presents with   • Coronary Artery Disease   • Follow-up   :     Dear Dr. Andrea Salazar,     HPI: Shaenka Guido is a pleasant 59 y.o. female who presents today for cardiac follow up.     She has a history of peripheral arterial disease and age 35 and underwent aortobifemoral bypass.  At age 45, she woke up with an acute ischemic right leg and required urgent surgical revascularization.  She has been diagnosed with left subclavian stenosis and carotid artery disease.  In , she suffered an acute anterior myocardial infarction and underwent bare-metal stent placement to the LAD and coronary artery bypass graft surgery the following day.    In 2019, she was having chest pain.  On 2019 she had a cardiac PET scan and 2D echocardiogram completed with the following results.  Cardiac PET scan completed showed an EF of 50% and large size, moderate to severe area of ischemia in inferior wall.  On 19 echocardiogram showed EF 51-55%, grade 2 diastolic dysfunction, akinesis of the distal anterior septum and anterior apex, left atrium mildly dilated, and mild mitral valve insufficiency.    On 2019, cardiac cath was completed with the following results: \"Severe multivessel coronary disease.  The jump vein grafts to the OM1 and OM2 is occluded.  The vein graft to the circumflex and RCA are occluded.  The proximal RCA is occluded and receives robust collaterals from the left.  The LAD has mild diffuse disease and a patent proximal stent.  The LIMA backfills via the LAD.  The proximal subclavian is totally occluded.\"  She was started on ranolazine, but this was discontinued due to lightheadedness and fatigue.    She has peripheral arterial disease and " underwent bilateral angioplasty of the profunda.  Carotid studies have shown 60-70% on the right and 50-60% stenosis on the left.  She has a known left subclavian occlusion.  Her PAD is followed by Dr. Edwin Landin.    In January 2020, she followed up with Dr. Demond Owen and was stable at that time.    She presents today for follow-up.  She feels like she has been doing fine from a cardiac standpoint.  She explains that she suffers from PTSD and anxiety.  Recently she had a steak at her house which has caused her to feel more anxious.  She is now sleeping in her bed inside her bedroom with a screen and 10 to around it because she is so scared of snakes.  She says this is really increased her anxiety and her blood pressure has gone up.  She is no longer taking the amlodipine 2.5 mg daily because it caused her just swell all over.  She has chronic shortness of breath which is unchanged and denies chest pain.  She often wakes up with nightmares in the middle of the night which caused her to be short of breath.  I asked her about sleep apnea and she has declined testing.  She really thinks that the waking up is due to the nightmares.    Past Medical History:   Diagnosis Date   • Anxiety disorder    • CAD (coronary artery disease)     anterior MI 2012, BMS to LAD, then CABG x 4 (LIMA-LAD, Y SVG-OM1-OM2, SVG-rPDA)   • Chronic bronchitis (CMS/HCC)    • Chronic constipation    • Chronic diastolic (congestive) heart failure (CMS/HCC)    • COPD (chronic obstructive pulmonary disease) (CMS/HCC)    • Eczema of face    • Esophageal stricture    • Essential hypertension    • Generalized headaches    • GERD (gastroesophageal reflux disease)    • Hemorrhoids    • History of tobacco use    • Hyperlipidemia    • Incontinence    • Leg edema, left    • On anticoagulant therapy    • Osteoarthritis    • PAD (peripheral artery disease) (CMS/HCC)    • Panic attacks    • PTSD (post-traumatic stress disorder)     per patient    • Subclavian  artery stenosis, left (CMS/HCC)     BP should not be checked in that arm       Past Surgical History:   Procedure Laterality Date   • ANGIOPLASTY     • BRONCHOSCOPY     • CARDIAC CATHETERIZATION N/A 2019    Procedure: Coronary angiography;  Surgeon: Nickie Zhu MD;  Location:  [facility] CATH INVASIVE LOCATION;  Service: Cardiovascular   • CARDIAC CATHETERIZATION N/A 2019    Procedure: Left heart cath;  Surgeon: Nickie Zhu MD;  Location:  [facility] CATH INVASIVE LOCATION;  Service: Cardiovascular   • CARDIAC CATHETERIZATION N/A 2019    Procedure: Left ventriculography;  Surgeon: Nickie Zhu MD;  Location:  [facility] CATH INVASIVE LOCATION;  Service: Cardiovascular   • CARDIAC CATHETERIZATION N/A 2019    Procedure: Bypass graft study;  Surgeon: Nickie Zhu MD;  Location:  [facility] CATH INVASIVE LOCATION;  Service: Cardiovascular   • CHOLECYSTECTOMY     • CORONARY ARTERY BYPASS GRAFT     • HYSTERECTOMY     • LAPAROSCOPIC TUBAL LIGATION         Social History     Socioeconomic History   • Marital status: Single     Spouse name: Not on file   • Number of children: Not on file   • Years of education: Not on file   • Highest education level: Not on file   Tobacco Use   • Smoking status: Former Smoker     Years: 45.00     Last attempt to quit: 2018     Years since quittin.1   • Smokeless tobacco: Never Used   Substance and Sexual Activity   • Alcohol use: No     Frequency: Never     Comment: CAFFEINE USE: SOFT DRINKS OCCASIONALLY.    • Drug use: No   • Sexual activity: Defer       Family History   Problem Relation Age of Onset   • Chronic bronchitis Mother    • Heart disease Mother    • Hypertension Mother    • Osteoporosis Mother    • Cancer Father    • Heart disease Father    • Diabetes Father    • Hypertension Sister    • Osteoporosis Sister    • Asthma Daughter    • Hypertension Daughter    • Osteoporosis Daughter    • Hypertension Son    • Chronic bronchitis Son    • Malig Hyperthermia Neg Hx         The following portion of the patient's history were reviewed and updated as appropriate: past medical history, past surgical history, past social history, past family history, allergies, current medications, and problem list.    Review of Systems   Constitution: Positive for malaise/fatigue. Negative for chills, diaphoresis, fever, night sweats, weight gain and weight loss.   HENT: Negative for hearing loss, nosebleeds, sore throat and tinnitus.    Eyes: Negative for blurred vision, double vision, pain and visual disturbance.   Cardiovascular: Positive for dyspnea on exertion. Negative for chest pain, claudication, cyanosis, irregular heartbeat, leg swelling, near-syncope, orthopnea, palpitations, paroxysmal nocturnal dyspnea and syncope.   Respiratory: Positive for cough and shortness of breath. Negative for hemoptysis, snoring and wheezing.    Endocrine: Positive for cold intolerance and heat intolerance. Negative for polyuria.   Hematologic/Lymphatic: Negative for bleeding problem. Does not bruise/bleed easily.   Skin: Negative for color change, dry skin, flushing and itching.   Musculoskeletal: Positive for joint pain and myalgias. Negative for falls, joint swelling, muscle cramps and muscle weakness.   Gastrointestinal: Positive for nausea. Negative for abdominal pain, constipation, heartburn, melena and vomiting.   Genitourinary: Positive for frequency. Negative for dysuria and hematuria.   Neurological: Positive for excessive daytime sleepiness, dizziness, headaches, light-headedness, numbness and paresthesias. Negative for loss of balance, seizures and vertigo.   Psychiatric/Behavioral: Positive for depression. Negative for altered mental status, memory loss and substance abuse. The patient is nervous/anxious. The patient does not have insomnia.    Allergic/Immunologic: Negative for environmental allergies.       Allergies   Allergen Reactions   • Albuterol Other (See Comments)     Mouth blisters,  "throat swells   • Latex Other (See Comments)     Severe blistering, throat swells   • Penicillins Other (See Comments)     Throat swells   • Prednisone Other (See Comments)     Fluid retention.    • Influenza Vaccines Rash         Current Outpatient Medications:   •  aspirin 81 MG EC tablet, Take 81 mg by mouth Daily., Disp: , Rfl:   •  atorvastatin (LIPITOR) 20 MG tablet, Take 20 mg by mouth Daily., Disp: , Rfl:   •  clopidogrel (PLAVIX) 75 MG tablet, Take 75 mg by mouth Daily., Disp: , Rfl: 2  •  furosemide (LASIX) 40 MG tablet, Take 40 mg by mouth As Needed., Disp: , Rfl:   •  isosorbide mononitrate (IMDUR) 60 MG 24 hr tablet, Take 60 mg by mouth 2 (Two) Times a Day., Disp: , Rfl:   •  levalbuterol (XOPENEX HFA) 45 MCG/ACT inhaler, Inhale 1-2 puffs Every 4 (Four) Hours As Needed for Wheezing., Disp: , Rfl:   •  levalbuterol (XOPENEX) 1.25 MG/3ML nebulizer solution, Take 1 ampule by nebulization Every 4 (Four) Hours As Needed for Wheezing., Disp: , Rfl:   •  metoprolol tartrate (LOPRESSOR) 25 MG tablet, Take 25 mg by mouth 2 (Two) Times a Day., Disp: , Rfl: 2  •  omeprazole (priLOSEC) 40 MG capsule, Take 40 mg by mouth Daily., Disp: , Rfl:   •  tiotropium bromide-olodaterol (STIOLTO RESPIMAT) 2.5-2.5 MCG/ACT aerosol solution inhaler, Inhale 2 puffs Daily., Disp: , Rfl:         Objective:     Vitals:    07/23/20 1047 07/23/20 1121   BP: 166/80 142/82   BP Location: Right arm Right arm   Patient Position:  Sitting   Pulse: 76    Weight: 94.8 kg (209 lb)    Height: 154.9 cm (61\")      Body mass index is 39.49 kg/m².    PHYSICAL EXAM:    Vitals Reviewed.   General Appearance: No acute distress, well developed and well nourished.  Obese.  Eyes: Conjunctiva and lids: No erythema, swelling, or discharge. Sclera non-icteric.   HENT: Atraumatic, normocephalic. External eyes, ears, and nose normal. No hearing loss noted. Mucous membranes normal. Lips not cyanotic. Neck supple with no tenderness.  Respiratory: No signs of " respiratory distress. Respiration rhythm and depth normal.   Clear to auscultation. No rales, crackles, rhonchi, or wheezing auscultated.   Cardiovascular:  Jugular Venous Pressure: Normal  Heart Rate and Rhythm: Normal, Heart Sounds: Normal S1 and S2. No S3 or S4 noted.  Murmurs: No murmurs noted. No rubs, thrills, or gallops.   Arterial Pulses: Carotid pulses normal. No carotid bruit noted. Lower Extremities: No edema noted.  Left and right wrist clean dry and intact post catheterization.  Gastrointestinal:  Abdomen soft, non-distended, non-tender. Normal bowel sounds. No hepatomegaly.   Musculoskeletal: Normal movement of extremities  Skin and Nails: General appearance normal. No pallor, cyanosis, diaphoresis. Skin temperature normal. No clubbing of fingernails.   Psychiatric: Patient alert and oriented to person, place, and time. Speech and behavior appropriate. Normal mood and affect.       ECG 12 Lead  Date/Time: 7/23/2020 10:50 AM  Performed by: Daria Henderson APRN  Authorized by: Daria Henderson APRN   Comparison: compared with previous ECG from 1/23/2020  Similar to previous ECG  Rhythm: sinus rhythm  Rate: normal  BPM: 76  Conduction: conduction normal  ST Segments: ST segments normal  T Waves: T waves normal  QRS axis: normal  Other findings: non-specific ST-T wave changes    Clinical impression: non-specific ECG              Assessment:       Diagnosis Plan   1. Coronary artery disease of native artery of native heart with stable angina pectoris (CMS/[facility])     2. PAD (peripheral artery disease) (CMS/[facility])     3. Essential hypertension     4. Morbidly obese (CMS/[facility])     5. PTSD (post-traumatic stress disorder)            Plan:       1.  Coronary Artery Disease: CCS class I.  Currently denies chest pain, but continues with chronic shortness of breath.  She remains on aspirin, clopidogrel, atorvastatin, metoprolol, and isosorbide.  She was tried on ranolazine, but was discontinued due to adverse  effects.  Dr. Owen is noted that all of her grafts are occluded and she is living off LIMA that is supplied by an occluded left subclavian (which is stealing from her vertebrals) but her native LAD is patent.  Status post CABG 2012 and status post bare-metal stent placement 2012.    2.  Peripheral Arterial Disease: Follows with Dr. Edwin Landin for PAD/carotid/subclavian disease.    3.  Hypertension: Blood pressure is elevated today and she thinks it is because she has the snake phobia.  She recently had a snake in her house and is very concerned.  She was unable to tolerate amlodipine.    4.  Obesity: BMI is 39 .5 and she would benefit from weight loss and exercise.    5.  PTSD: She says that she has had PTSD from previously living in [place].  She recently had a snake in her house which has restarted her PTSD and she is living in fear in her house because of the snake.  I recommended that she discuss medication therapy with her PCP.    6. I have recommended follow-up with Dr. Owen in 6 months, unless otherwise needed sooner.    As always, it has been a pleasure to participate in your patient's care. Thank you.       Sincerely,         CLIFFORD Ryder        Dictated using Dragon Dictation

## 2021-03-12 NOTE — PROGRESS NOTES
RM:________     PCP: Andrea Salazar MD    : 1960  AGE: 60 y.o.  EST PATIENT   REASON FOR VISIT/  CC:    BP Readings from Last 3 Encounters:   20 142/82   20 160/80   19 134/86        WT: ____________ BP: __________L __________R HR______    CHEST PAIN: _____________    SOA: _____________PALPS: _______________     LIGHTHEADED: ___________FATIGUE: ________________ EDEMA __________    ALLERGIES:Albuterol, Latex, Penicillins, Prednisone, and Influenza vaccines SMOKING HISTORY:  Social History     Tobacco Use   • Smoking status: Former Smoker     Years: 45.00     Quit date: 2018     Years since quittin.7   • Smokeless tobacco: Never Used   Substance Use Topics   • Alcohol use: No     Comment: CAFFEINE USE: SOFT DRINKS OCCASIONALLY.    • Drug use: No     CAFFEINE USE_________________  ALCOHOL ______________________    Below is the patient's most recent value for Albumin, ALT, AST, BUN, Calcium, Chloride, Cholesterol, CO2, Creatinine, GFR, Glucose, HDL, Hematocrit, Hemoglobin, Hemoglobin A1C, LDL, Magnesium, Phosphorus, Platelets, Potassium, PSA, Sodium, Triglycerides, TSH and WBC.   Lab Results   Component Value Date    ALBUMIN 4.20 2019    ALT 17 2019    AST 18 2019    BUN 15 2019    CALCIUM 9.5 2019    CL 98 2019    CO2 25.1 2019    CREATININE 1.20 2019    HCT 40.2 2019    HGB 12.8 2019     2019    K 3.7 2019     (L) 2019    WBC 6.58 2019          NEW DIAGNOSIS/ SURGERY/ HOSP OR ED VISITS: ______________________    __________________________________________________________________      RECENT LABS OR DIAGNOSTIC TESTING:  _____________________________    __________________________________________________________________      ASSESSMENT/ PLAN: _______________________________________________    __________________________________________________________________

## 2021-03-31 ENCOUNTER — OFFICE VISIT (OUTPATIENT)
Dept: CARDIOLOGY | Facility: CLINIC | Age: 61
End: 2021-03-31

## 2021-03-31 VITALS
DIASTOLIC BLOOD PRESSURE: 66 MMHG | BODY MASS INDEX: 39.27 KG/M2 | WEIGHT: 208 LBS | SYSTOLIC BLOOD PRESSURE: 142 MMHG | HEIGHT: 61 IN | HEART RATE: 84 BPM

## 2021-03-31 DIAGNOSIS — I10 ESSENTIAL HYPERTENSION: ICD-10-CM

## 2021-03-31 DIAGNOSIS — I25.118 CORONARY ARTERY DISEASE OF NATIVE ARTERY OF NATIVE HEART WITH STABLE ANGINA PECTORIS (HCC): Primary | ICD-10-CM

## 2021-03-31 DIAGNOSIS — M79.89 LEG SWELLING: ICD-10-CM

## 2021-03-31 DIAGNOSIS — I73.9 PAD (PERIPHERAL ARTERY DISEASE) (HCC): ICD-10-CM

## 2021-03-31 DIAGNOSIS — E66.01 MORBIDLY OBESE (HCC): ICD-10-CM

## 2021-03-31 PROCEDURE — 99214 OFFICE O/P EST MOD 30 MIN: CPT | Performed by: INTERNAL MEDICINE

## 2021-03-31 PROCEDURE — 93000 ELECTROCARDIOGRAM COMPLETE: CPT | Performed by: INTERNAL MEDICINE

## 2021-03-31 RX ORDER — HYDROCHLOROTHIAZIDE 25 MG/1
25 TABLET ORAL DAILY
Qty: 90 TABLET | Refills: 3 | Status: SHIPPED | OUTPATIENT
Start: 2021-03-31 | End: 2022-04-05 | Stop reason: SDUPTHER

## 2021-03-31 RX ORDER — PANTOPRAZOLE SODIUM 40 MG/10ML
INJECTION, POWDER, LYOPHILIZED, FOR SOLUTION INTRAVENOUS DAILY
COMMUNITY
Start: 2021-02-13 | End: 2021-10-21

## 2021-03-31 NOTE — PROGRESS NOTES
"Date of Office Visit: 2021  Encounter Provider: Demond Owen MD  Place of Service: [facility] CARDIOLOGY  Patient Name: Shaneka Guido  :1960    Chief Complaint   Patient presents with   • Coronary Artery Disease   :     HPI: Shaneka Guido is a 60 y.o. female who presents today to follow up.  I have reviewed prior notes and there are no changes except for any new updates described below. I have also reviewed any information entered into the medical record by the patient or by ancillary staff.     She established care with me in 2019.  She has lived in many cities and even in [place], and has seen cardiologists at [place], in [place], and in [place].      All of her siblings and her mother have had severe CAD and PAD.  At the age of 35, she underwent aortobifemoral bypass.  At age 45, she woke up with an acute ischemic right leg and required urgent surgical revascularization.  She had an arterial doppler in  that reported left subclavian stenosis; she was unaware of this diagnosis, but did say that her blood pressure shouldn't be checked in that arm as it's \"too low.\"  She has a history of carotid arterial disease as well .    In , she presented with chest pain that she described as a feeling of \"ice\" in her chest.  She was found to have an acute anterior MI.  She underwent emergent BMS to the LAD and then underwent CABG the next day (details are in HPI).  Her LVEF was 50%.  She has had angiograms since then, but they've been difficult due to access issues.    An echo in  reported an LVEF of 40-45% with regional wall motion abnormalities (although it did not report which walls were abnormal) and normal valves.      When I met her, she reported recurrence of the sensation of \"ice\" in her chest. A PET stress reported a large amount of inferior ischemia.  She then underwent coronary angiography in May 2019.  The vein grafts to the OM1, OM2, " Cx, and RCA were all occluded, as was the native RCA.  The LAD had mild diffuse disease and a patent stent, and the LIMA backfilled the LAD.  The proximal subclavian was totally occluded.   She was started on ranolazine but had to stop it due to lightheadedness and fatigue. I then ordered amlodipine but she opted not to take it. She then established care with Dr. Landin.  She underwent bilateral angioplasty of the profunda.  She had carotid studies which showed 60-70% stenosis on the right and 50-60% stenosis on the left with retrograde vertebral filling consistent with known subclavian occlusion.    Her cardiac symptoms are stable and her claudication has improved. She denies chest pain.  She has chronic mild exertional dyspnea and chronic fatigue.  She has lost a bit of weight . She is having more pedal edema than before and is now taking furosemide once or twice a week (she uses in PRN). The last time she saw Dr Landin, her PAD had actually improved.     Past Medical History:   Diagnosis Date   • Anxiety disorder    • CAD (coronary artery disease)     anterior MI 2012, BMS to LAD, then CABG x 4 (LIMA-LAD, Y SVG-OM1-OM2, SVG-rPDA)   • Chronic bronchitis (CMS/HCC)    • Chronic constipation    • Chronic diastolic (congestive) heart failure (CMS/HCC)    • COPD (chronic obstructive pulmonary disease) (CMS/HCC)    • Eczema of face    • Esophageal stricture    • Essential hypertension    • Generalized headaches    • GERD (gastroesophageal reflux disease)    • Hemorrhoids    • History of tobacco use    • Hyperlipidemia    • Incontinence    • Leg edema, left    • On anticoagulant therapy    • Osteoarthritis    • PAD (peripheral artery disease) (CMS/HCC)    • Panic attacks    • PTSD (post-traumatic stress disorder)     per patient    • Subclavian artery stenosis, left (CMS/HCC)     BP should not be checked in that arm       Past Surgical History:   Procedure Laterality Date   • ANGIOPLASTY     • BRONCHOSCOPY     • CARDIAC  CATHETERIZATION N/A 2019    Procedure: Coronary angiography;  Surgeon: Nickie Zhu MD;  Location:  [facility];  Service: Cardiovascular   • CARDIAC CATHETERIZATION N/A 2019    Procedure: Left heart cath;  Surgeon: Nickie Zhu MD;  Location:  [facility];  Service: Cardiovascular   • CARDIAC CATHETERIZATION N/A 2019    Procedure: Left ventriculography;  Surgeon: Nickie Zhu MD;  Location:  [facility];  Service: Cardiovascular   • CARDIAC CATHETERIZATION N/A 2019    Procedure: Bypass graft study;  Surgeon: Nickie Zhu MD;  Location:  [facility];  Service: Cardiovascular   • CHOLECYSTECTOMY     • CORONARY ARTERY BYPASS GRAFT     • HYSTERECTOMY     • LAPAROSCOPIC TUBAL LIGATION         Social History     Socioeconomic History   • Marital status: Single     Spouse name: Not on file   • Number of children: Not on file   • Years of education: Not on file   • Highest education level: Not on file   Tobacco Use   • Smoking status: Former Smoker     Years: 45.00     Quit date: 2018     Years since quittin.7   • Smokeless tobacco: Never Used   Vaping Use   • Vaping Use: Never used   Substance and Sexual Activity   • Alcohol use: No     Comment: CAFFEINE USE: SOFT DRINKS OCCASIONALLY.    • Drug use: No   • Sexual activity: Defer       Family History   Problem Relation Age of Onset   • Chronic bronchitis Mother    • Heart disease Mother    • Hypertension Mother    • Osteoporosis Mother    • Cancer Father    • Heart disease Father    • Diabetes Father    • Hypertension Sister    • Osteoporosis Sister    • Asthma Daughter    • Hypertension Daughter    • Osteoporosis Daughter    • Hypertension Son    • Chronic bronchitis Son    • Malig Hyperthermia Neg Hx        Review of Systems   Constitutional: Positive for malaise/fatigue.   Cardiovascular: Positive for claudication, dyspnea on exertion and leg swelling.   Respiratory:  Positive for shortness of breath.    Endocrine: Positive for cold intolerance and heat intolerance.   Musculoskeletal: Positive for joint pain and myalgias.   Gastrointestinal: Positive for nausea.   Neurological: Positive for excessive daytime sleepiness, dizziness and headaches.   Psychiatric/Behavioral: Positive for depression. The patient is nervous/anxious.    All other systems reviewed and are negative.      Allergies   Allergen Reactions   • Albuterol Other (See Comments)     Mouth blisters, throat swells   • Amlodipine Swelling   • Latex Other (See Comments)     Severe blistering, throat swells   • Penicillins Other (See Comments)     Throat swells   • Prednisone Other (See Comments)     Fluid retention.    • Influenza Vaccines Rash         Current Outpatient Medications:   •  aspirin 81 MG EC tablet, Take 81 mg by mouth Daily., Disp: , Rfl:   •  atorvastatin (LIPITOR) 20 MG tablet, Take 20 mg by mouth Daily., Disp: , Rfl:   •  clopidogrel (PLAVIX) 75 MG tablet, Take 75 mg by mouth Daily., Disp: , Rfl: 2  •  furosemide (LASIX) 40 MG tablet, Take 40 mg by mouth As Needed., Disp: , Rfl:   •  isosorbide mononitrate (IMDUR) 60 MG 24 hr tablet, Take 60 mg by mouth 2 (Two) Times a Day., Disp: , Rfl:   •  levalbuterol (XOPENEX HFA) 45 MCG/ACT inhaler, Inhale 1-2 puffs Every 4 (Four) Hours As Needed for Wheezing., Disp: , Rfl:   •  levalbuterol (XOPENEX) 1.25 MG/3ML nebulizer solution, Take 1 ampule by nebulization Every 4 (Four) Hours As Needed for Wheezing., Disp: , Rfl:   •  metoprolol tartrate (LOPRESSOR) 25 MG tablet, Take 25 mg by mouth 2 (Two) Times a Day., Disp: , Rfl: 2  •  pantoprazole (PROTONIX) 40 MG injection, Daily., Disp: , Rfl:   •  tiotropium bromide-olodaterol (STIOLTO RESPIMAT) 2.5-2.5 MCG/ACT aerosol solution inhaler, Inhale 2 puffs Daily., Disp: , Rfl:       Objective:     Vitals:    03/31/21 1350   BP: 142/66   BP Location: Right arm   Pulse: 84   Weight: 94.3 kg (208 lb)   Height: 154.9 cm  "(61\")     Body mass index is 39.3 kg/m².    Physical Exam  Vitals reviewed.   Constitutional:       Comments: Obese   HENT:      Head: Normocephalic.      Nose: Nose normal.      Mouth/Throat:      Dentition: Abnormal dentition.      Comments: Masked  Eyes:      Conjunctiva/sclera: Conjunctivae normal.      Pupils: Pupils are equal, round, and reactive to light.   Neck:      Vascular: No JVD.      Comments: Cannot assess for JVD due to habitus  Cardiovascular:      Rate and Rhythm: Normal rate and regular rhythm.      Pulses: Decreased pulses.      Heart sounds: Normal heart sounds. No murmur heard.     Pulmonary:      Effort: Pulmonary effort is normal.      Breath sounds: Normal breath sounds.   Abdominal:      Palpations: Abdomen is soft.      Tenderness: There is no abdominal tenderness.      Comments: Obesity limits abdominal exam   Musculoskeletal:         General: Normal range of motion.      Cervical back: Normal range of motion.   Skin:     General: Skin is warm and dry.      Findings: No rash.   Neurological:      Mental Status: She is alert and oriented to person, place, and time.      Cranial Nerves: No cranial nerve deficit.   Psychiatric:         Behavior: Behavior normal.         Thought Content: Thought content normal.         Judgment: Judgment normal.           ECG 12 Lead    Date/Time: 3/31/2021 2:09 PM  Performed by: Demond Owen MD  Authorized by: Demond Owen MD   Comparison: compared with previous ECG   Similar to previous ECG  Rhythm: sinus rhythm  Conduction: non-specific intraventricular conduction delay  Q waves: II, III and aVF    ST Flattening: all  T Waves: T waves normal  QRS axis: normal  Other findings: left atrial abnormality    Clinical impression: abnormal EKG              Assessment:       Diagnosis Plan   1. Coronary artery disease of native artery of native heart with stable angina pectoris (CMS/[facility])  ECG 12 Lead   2. PAD (peripheral artery disease) (CMS/[facility])     3. " Essential hypertension     4. Leg swelling     5. Morbidly obese (CMS/[facility])            Plan:       1.  Coronary Artery Disease  Assessment  • The patient has CCS class I - angina only during strenuous or prolonged physical activity    Subjective - Objective  • There is a history of past MI 2012  • There is a history of previous coronary artery bypass graft 2012  • There has been a previous stent procedure using BMS 2012  • Current antiplatelet therapy includes aspirin 81 mg and clopidogrel 75 mg    She's on clopidogrel and aspirin due to to PAD, and is on atorvastatin.  She feels that her symptoms are stable.  She's on metoprolol and ISMN.  All of her grafts are occluded to her RCA/LCx as are the native vessels; she is living off a LIMA that is supplied by an occluded left subclavian (so she's stealing from her vertebrals), but her native LAD is patent as well.   She does not tolerate ranolazine.     2.  She has complex vascular disease which is followed by Dr. Landin. She's on DAPT.      3/4.  This is not within goal, and she's having more pedal edema.  However, she is eating a lot of processed foods which are high in salt. Her echo in 2015 showed normal LVSF and grade II diastolic dysfunction.  Her labs from December showed normal renal function (cr 1, Na 138, K 4.2).  I will start HCTZ, 25mg daily, to try to keep the edema down so she doesn't have to use furosemide as much.    Sincerely,       Demond Owen MD

## 2021-05-15 VITALS — HEIGHT: 61 IN | HEART RATE: 62 BPM | WEIGHT: 210 LBS | BODY MASS INDEX: 39.65 KG/M2 | OXYGEN SATURATION: 98 %

## 2021-05-28 VITALS
HEART RATE: 74 BPM | DIASTOLIC BLOOD PRESSURE: 65 MMHG | RESPIRATION RATE: 14 BRPM | OXYGEN SATURATION: 97 % | HEIGHT: 61 IN | BODY MASS INDEX: 37.04 KG/M2 | TEMPERATURE: 97.4 F | BODY MASS INDEX: 37.41 KG/M2 | SYSTOLIC BLOOD PRESSURE: 160 MMHG | WEIGHT: 198.12 LBS | DIASTOLIC BLOOD PRESSURE: 62 MMHG | HEIGHT: 61 IN | SYSTOLIC BLOOD PRESSURE: 173 MMHG | WEIGHT: 196.19 LBS | RESPIRATION RATE: 16 BRPM | OXYGEN SATURATION: 98 % | TEMPERATURE: 98.1 F | HEART RATE: 72 BPM

## 2021-05-28 VITALS
WEIGHT: 197 LBS | OXYGEN SATURATION: 94 % | DIASTOLIC BLOOD PRESSURE: 56 MMHG | HEART RATE: 78 BPM | HEIGHT: 61 IN | TEMPERATURE: 98.6 F | SYSTOLIC BLOOD PRESSURE: 142 MMHG | BODY MASS INDEX: 37.19 KG/M2 | RESPIRATION RATE: 14 BRPM

## 2021-05-28 NOTE — PROGRESS NOTES
Patient: SANTOSH PARR     Acct: [id number]     Report: #[id number]  UNIT #: [id number]     : 1960    Encounter Date:2018  PRIMARY CARE: REGINALDO JAIME  ***Signed***  --------------------------------------------------------------------------------------------------------------------  Chief Complaint      Encounter Date      Mar 9, 2018            Referring Provider      SELF,REFERRED PATIENT            Patient Complaint      Patient is complaining of      Acute Visit/Swelling/Recent Fungal Infect/Congeste            VITALS      Height 5 ft 1 in / 154.94 cm      Weight 197 lbs 0 oz / 89.509529 kg      BSA 2.01 m2      BMI 37.2 kg/m2      Temperature 98.6 F / 37 C - Oral      Pulse 78      Respirations 14      Blood Pressure 142/56 Sitting, Right Arm      Pulse Oximetry 94%, roomair@rest      Exhaled Nitrous Oxide Testin            HPI      The patient is a 57 year old white female who was seen by Dr. Elizabeth about 1     month ago for hospital follow up. She was admitted to [facility]     with chronic hypercapnic respiratory failure and was found to have multifocal     fungal pneumonia. She was started on Itraconazole initially at 100 mg twice     daily increased to 200 mg twice daily after her last office visit. She was     doing better in the hospital on steroids in terms of her respiratory status so     Dr. Elizabeth started her on a slow steroid taper with Prednisone 40 mg PO daily     for 1 month and 30 mg for the next month etc. However when she started taking     40 mg PO daily, she developed what she reports as extreme lower extremity edema     up into her thighs, abdominal distension and increasing shortness of breath.     She does have a history of congestive heart failure but is unsure if she has     systolic or diastolic dysfunction. She is chronically on Lasix 40 mg PO daily     and knows to take extra Lasix if she has increased swelling so she kept taking      more and more Lasix to try to relieve her lower extremity edema while on     Prednisone to the point that she began taking up to 200 mg PO daily per her     report. She was taking 5 tablets of 40 mg. Despite taking this her lower     extremity edema reportedly did not resolve until she went off the Prednisone.     Once she did that all her generalized edema, abdominal distension and lower     extremity edema began to resolve and is now almost back to her baseline however     she reports muscle soreness throughout her body. She does report that she is     slightly more dyspneic after the Prednisone but denies wheezing and has only     had an occasional cough productive of white sputum. She denies hemoptysis,     fevers or chills.  She did have a chest x-ray this morning that was negative     for pulmonary edema or pleural effusions.  She is due for a follow up chest CT     scan this month but has had issues with insurance covering that so that is     still pending.  She has not been taking any oral potassium at home while on     Lasix. She has continued to use Stiolto daily and feels that is helping her     quite a bit.             I have personally reviewed the Review of Systems, past family, social, surgical     and medical histories and I agree with the findings.            ROS      Constitutional:  Denies: Fatigue, Fever, Weight gain, Weight loss, Chills,     Insomnia, Other      Respiratory/Breathing:  Complains of: Shortness of air, Wheezing, Cough, Other (    body aches ), Denies: Hemoptysis, Pleuritic pain      Endocrine:  Denies: Polydipsia, Polyuria, Heat/cold intolerance, Abnorml     menstrual pattern, Diabetes, Other      Eyes:  Denies: Blurred vision, Vision Changes, Other      Ears, nose, mouth, throat:  Denies: Mouth lesions, Thrush, Throat pain,     Hoarseness, Allergies/Hay Fever, Post Nasal Drip, Headaches, Recent Head Injury    , Nose Bleeding, Neck Stiffness, Thyroid Mass, Hearing Loss, Ear  Fullness, Dry     Mouth, Nasal or Sinus Pain, Dry Lips, Nasal discharge, Nasal congestion, Other      Cardiovascular:  Denies: Palpitations, Syncope, Claudication, Chest Pain, Wake     up Gasping for air, Leg Swelling, Irregular Heart Rate, Cyanosis, Dyspnea on     Exertion, Other      Gastrointestinal:  Denies: Nausea, Constipation, Diarrhea, Abdominal pain,     Vomiting, Difficulty Swallowing, Reflux/Heartburn, Dysphagia, Jaundice, Bloating    , Melena, Bloody stools, Other      Genitourinary:  Denies: Urinary frequency, Incontinence, Hematuria, Urgency,     Nocturia, Dysuria, Testicular problems, Other      Musculoskeletal:  Denies: Joint Pain, Joint Stiffness, Joint Swelling, Myalgias    , Other      Hematologic/lymphatic:  DENIES: Lymphadenopathy, Bruising, Bleeding tendencies,     Other      Neurological:  Complains of: Other (dizzy), Denies: Headache, Numbness, Weakness    , Seizures      Psychiatric:  Denies: Anxiety, Appropriate Effect, Depression, Other      Sleep:  No: Excessive daytime sleep, Morning Headache?, Snoring, Insomnia?,     Stop breathing at sleep?, Other      Integumentary:  Denies: Rash, Dry skin, Skin Warm to Touch, Other      Immunologic/Allergic:  Denies: Latex allergy, Seasonal allergies, Asthma,     Urticaria, Eczema, Other      Immunization status:  No: Up to date            FAMILY/SOCIAL/MEDICAL HX      Surgical History:  Yes: CABG (4V CABG ), Cholecystectomy, Vascular Surgery (SEE     ABOVE), No: AAA Repair, Abdominal Surgery, Angioplasty, Appendectomy, Back     Surgery, Bladder Surgery, Bowel Surgery, Breast Surgery, Carotid Stenosis, Ear     Surgery, Eye Surgery, Head Surgery, Hernia Surgery, Kidney Surgery, Nose Surgery    , Oral Surgery, Orthopedic Surgery, Prostatectomy, Rectal Surgery, Spinal     Surgery, Testicular Surgery, Throat Surgery, Valve Replacement, Other Surgeries      Stroke - Family Hx:  Grandparent      Heart - Family Hx:  Mother, Father      Diabetes - Family Hx:   Father      Cancer/Type - Family Hx:  Father      Is Father Still Living?:  Yes      Is Mother Still Living?:  No      Social History:  No Tobacco Use, No Alcohol Use, No Recreational Drug use      Smoking status:  Former smoker (1 PPD FOR 40 YEARS QUIT FOR 6 YEARS)      Anticoagulation Therapy:  No      Antibiotic Prophylaxis:  No      Medical History:  Yes: Arthritis (HIPS, HANDS), Chronic Bronchitis/COPD,     Congestive Heart Failu, Heart Attack, Hemorrhoids/Rectal Prob (REFLUX), High     Blood Pressure (ON MEDS), Shortness Of Breath, No: Alcoholism, Allergies, Anemia    , Asthma, Blood Disease, Broken Bones, Cataracts, Chemical Dependency,     Chemotherapy/Cancer, Emphysema, Chronic Liver Disease, Colon Trouble, Colitis,     Diverticulitis, Deafness or Ringing Ears, Convulsions, Depression, Anxiety,     Bipolar Disorder, Diabetes, Epilepsy, Seizures, Forgetfullness, Glaucoma, Gall     Stones, Gout, Head Injury, Heart Murmur, Hepatitis, Hiatal Hernia, High     Cholesterol, HIV (Do not ask - volu, Jaundice, Kidney or Bladder Disease,     Kidney Stones, Migrane Headaches, Mitral Valve Prolapse, Night sweats, Phlebitis    , Psychiatric Care, Reflux Disease, Rheumatic Fever, Sexually Transmitted Dis,     Sinus Trouble, Skin Disease/Psoriais/Ecz, Stroke, Thyroid Problem, Tuberculosis     or Pos TB Te, Miscellaneous Medical/oth            Hx Influenza Vaccination:  No      Influenza Vaccine Declined:  Yes      2 or More Falls Past Year?:  No      Fall Past Year with Injury?:  No      Hx Pneumococcal Vaccination:  No      Encouraged to follow-up with:  PCP regarding preventative exams.      Chart initiated by      bhupendra marcum ma            ALLERGIES/MEDICATIONS      Allergies:        Coded Allergies:             ALBUTEROL (Verified  Allergy, Severe, MOUTH BLISTERS, THROAT SWELLS, 3/9/18    )           LATEX (Verified  Allergy, Severe, SEVERE BLISTERING, THROAT SWELLS, 3/9/18)           PENICILLINS (Verified  Allergy,  Severe, THROAT SWELLS, 3/9/18)           OXYCODONE (Verified  Adverse Reaction, Unknown, NAUSEA/VOMITING, 3/9/18)      Uncoded Allergies:             IV CONTRAST (Allergy, Severe, THROAT SWELLS, 12/26/17)      Medications    Last Reconciled on 3/9/18 11:35 by LISA CASTRO PA-C      Fluticasone (Flovent HFA) 110 Mcg Inhaler      2 PUFFS INH RTBID, #1 INH 3 Refills         Prov: Christi Castro PA-C         3/9/18       Atorvastatin (Atorvastatin) 20 Mg Tablet      20 MG PO HS, #30 TAB 0 Refills         Reported         3/9/18       Itraconazole (Sporanox) 100 Mg Cap      200 MG PO BID, #120 CAP 3 Refills         Prov: CASSIUS MORROW         2/14/18       Tiotropium Br/Olodaterol HCl (Stiolto Respimat Inhal Spray) 4 Gm Mist.inhal      2 PUFFS INH RTQDAY, #1 INH 0 Refills         Prov: Yordan Ramírez         1/4/18       Ranitidine Hcl (Ranitidine*) 150 Mg Tablet      150 MG PO BID Y for INDIGESTION/HEARTBURN, #60 TAB 0 Refills         Reported         12/31/17       Metoprolol Tartrate (Lopressor*) 25 Mg Tablet      25 MG PO BID, #60 TAB 0 Refills         Reported         12/26/17       NEB-Levalbuterol (LEVALBUTEROL HCL) 1.25 Mg/3 Ml Vial.neb      1.25 MG INH RTTID Y for SHORTNESS OF BREATH/WHEEZING, #90 NEB         Reported         12/26/17       Levalbuterol Tartrate (Xopenex HFA) 15 Gm Hfa.aer.ad      1-2 PUFFS INH RTQ4H, INH         Reported         12/26/17       Furosemide* (Lasix*) 40 Mg Tablet      40 MG PO QDAY Y for SWELLING, #30 TAB 0 Refills         Reported         12/26/17       Clopidogrel Bisulfate (Plavix) 75 Mg Tablet      75 MG PO QDAY, TAB         Reported         12/26/17       Isosorbide Mononitrate ER (Isosorbide Mononitrate ER) 60 Mg Tab.er.24h      60 MG PO BID, TAB.ER.24H         Reported         12/26/17       Aspirin (Aspirin Baby *) 81 Mg Tab.chew      81 MG PO QDAY, #30 TAB.CHEW 0 Refills         Reported         12/26/17      Current Medications      Current Medications Reviewed  3/9/18            EXAM      Vital Signs Reviewed.      General:  WDWN, Alert, NAD.      HEENT: PERRL, EOMI.  OP, nares clear, no sinus tenderness.      Neck: Supple, no JVD, no thyromegaly.      Lymph: No axillary, cervical, supraclavicular lymphadenopathy noted bilaterally.      Chest:  Grossly clear to auscultation bilaterally without wheezes, rhonchi or     crackles.       CV: RRR, no MGR, pulses 2+, equal.        Abd: Soft, NT, ND, +BS, no HSM.      EXT: No clubbing, no cyanosis, no edema, no joint tenderness.  Bilateral lower     extremities are warm and dry with trace pitting edema.       Neuro:  A  Skin: No rashes or lesions.      Vtials      Vitals:             Height 5 ft 1 in / 154.94 cm           Weight 197 lbs 0 oz / 89.567077 kg           BSA 2.01 m2           BMI 37.2 kg/m2           Temperature 98.6 F / 37 C - Oral           Pulse 78           Respirations 14           Blood Pressure 142/56 Sitting, Right Arm           Pulse Oximetry 94%, roomair@rest            REVIEW      Results Reviewed      PCCS Results Reviewed?:  Yes Prev Lab Results, Yes Prev Radiology Results, Yes     Previous Mecial Records      Radiographic Results               [facility] Diagnostic Img                PACS RADIOLOGY REPORT            Patient: SANTOSH PARR   Acct: #[id number]   Report: #[id number]            UNIT #: [id number]    DOS: 18 1024      INSURANCE:[employer]   ORDER #:[id number]      LOCATION:[facility]     : 1960            PROVIDERS      ADMITTING:     ATTENDING: CASSIUS MORROW      FAMILY:  CASSIUS MORROW   ORDERING:  CASSIUS MORROW         OTHER: Christi Castro PA-C   DICTATING:  MATEUS SYLVESTER MD            REQ #:[id number]   EXAM:CXR2 - CHEST 2V AP PA LAT      REASON FOR EXAM:        REASON FOR VISIT:  FUNGAL PNEUMONIA            *******Signed******         PROCEDURE:   CHEST AP/PA AND LATERAL             COMPARISON:   [facility]  [facility], , CHEST PA/AP   :49.             INDICATIONS:   PATIENT STATES FUNGAL PNEUMONIA 3 MONTHS AGO. STILL HAVING     COUGH. ORDER ALSO STATES       CHRONIC BRONCHITIS.             FINDINGS:         The heart is enlarged with changes of CABG procedure.  There are no focal     infiltrates.  Calcified       granulomata are seen.  There are mild age related degenerative changes of the     spine.             CONCLUSION:         1. Cardiomegaly and changes of CABG.      2. Resolution of previously noted right basal lateral density              MATEUS SYLVESTER MD             Electronically Signed and Approved By: MATEUS SYLVESTER MD on 3/09/2018 at 10:52                        Until signed, this is an unconfirmed preliminary report that may contain      errors and is subject to change.                                              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            RUBIO (dyspnea on exertion) - R06.09            Leg edema - R60.0            Generalized edema - R60.1            Notes      New Medications      * Atorvastatin 20 MG TABLET: 20 MG PO HS #30      * Fluticasone (Flovent HFA) 110 MCG INHALER: 2 PUFFS INH RTBID #1       Dx: Elevated IgE level - R76.8      Discontinued Medications      * Guaifenesin (Humibid La*) 600 MG TAB.ER.12H: 1,200 MG PO BID #40      * predniSONE* 20 MG TABLET: 40 MG PO ASDIR #120       Instructions: 40mg qay x 30 days, 30mg qday x 30 days, 20mg qday x 30 days,     10mg qday x 30 days then stop       Dx: Chronic bronchitis - J42      New Diagnostics      * Probrain Natriuretic, As Soon As Possible       Dx: Elevated IgE level - R76.8      * CMP Comp Metabolic Panel, Week      * Probrain Natriuretic, Routine      ASSESSMENT:      1. Fungal pneumonia on Itraconazole 200 mg PO twice daily.       2. Tobacco abuse of cigarettes now in remission.        3. Elevated IgE level.       4. Dyspnea on exertion.       5. Generalized edema resolving.       6. Lower extremity edema resolving.             PLAN:      1. At this time I will have the patient check a comprehensive metabolic profile     and a BNP today. She will also have a repeat IgE level checked which was     ordered at her last office visit.       2. I instructed her to stay off the Prednisone at this time as she had so many     issues with swelling while on it. She even took 20 mg PO daily at home and     still had increased edema.       3. Continue with Stiolto and I have added flovent as an inhaled corticosteroid     and may help with her inflammation.       4. Dr. Elizabeth and I  will review her chest CT scan once that is completed this     month. I will review her CBC and BNP evaluating her volume status, renal     function and electrolytes. We will place her on supplemental potassium if her     potassium level is low.       5.  Follow up in 1 month with Dr. Elizabeth, sooner if needed.            Patient Education      Time Spent:  > 50% /[facility]                 Disclaimer: Converted document may not contain table formatting or lab diagrams. Please see [employer] for the authenticated document.

## 2021-05-28 NOTE — PROGRESS NOTES
Patient: SANTOSH PARR     Acct: [id number]     Report: #[id number]  UNIT #: [id number]     : 1960    Encounter Date:2018  PRIMARY CARE: REGINALDO JAIME  ***Signed***  --------------------------------------------------------------------------------------------------------------------  Chief Complaint      Encounter Date      2018            Primary Care Provider      CHANA JAIME            Referring Provider      SELF,REFERRED PATIENT            Patient Complaint      Patient is complaining of      1 mo f/u            VITALS      Height 5 ft 1.00 in / 154.94 cm      Weight 198 lbs 2.000 oz / 89.523662 kg      BSA 2.01 m2      BMI 37.4 kg/m2      Temperature 97.4 F / 36.33 C - Oral      Pulse 72      Respirations 14      Blood Pressure 173/65 Sitting, Right Arm      Pulse Oximetry 97%, roomair      Exhaled Nitrous Oxide Testin            HPI      The patient is a very pleasant 57 year old  female with AVPM and     influenza here for follow up.               Since her last office visit she had CT scan of the chest done which is     completely clear. She is on her last month of Itraconazole therapy and she is     off steroids. She is on Stiolto and has significant issues with mucous     plugging. She gets short of breath and feels secretions are stuck in her throat     and she will cough up globs of what she describes as mozzarella cheese thick     secretions. She denies any leg swelling or orthopnea. She gets short of breath     walking about 500-600 feet, worse with exertion, better with rest. She denies     any chest pain or hemoptysis. She denies any wheezing. Xopenex nebulizers help     clear these secretions as she takes them four times a day. She also takes with     a flutter valve. She is not on Brovana and Pulmicort.  She denies any nausea     and vomiting, fevers or chills, headaches or hemoptysis or chest pain. She has     40 pack year cigarettes smoking  history and quit 6 years ago.              I have personally reviewed the Review of Systems, past family, social, surgical     and medical histories and I agree with the findings.            ROS      Constitutional:  Denies: Fatigue, Fever, Weight gain, Weight loss, Chills,     Insomnia, Other      Respiratory/Breathing:  Complains of: Shortness of air, Denies: Wheezing, Cough    , Hemoptysis, Pleuritic pain, Other      Endocrine:  Denies: Polydipsia, Polyuria, Heat/cold intolerance, Abnorml     menstrual pattern, Diabetes, Other      Eyes:  Denies: Blurred vision, Vision Changes, Other      Ears, nose, mouth, throat:  Denies: Mouth lesions, Thrush, Throat pain,     Hoarseness, Allergies/Hay Fever, Post Nasal Drip, Headaches, Recent Head Injury    , Nose Bleeding, Neck Stiffness, Thyroid Mass, Hearing Loss, Ear Fullness, Dry     Mouth, Nasal or Sinus Pain, Dry Lips, Nasal discharge, Nasal congestion, Other      Cardiovascular:  Complains of: Leg Swelling, Denies: Palpitations, Syncope,     Claudication, Chest Pain, Wake up Gasping for air, Irregular Heart Rate,     Cyanosis, Dyspnea on Exertion, Other      Gastrointestinal:  Denies: Nausea, Constipation, Diarrhea, Abdominal pain,     Vomiting, Difficulty Swallowing, Reflux/Heartburn, Dysphagia, Jaundice, Bloating    , Melena, Bloody stools, Other      Genitourinary:  Denies: Urinary frequency, Incontinence, Hematuria, Urgency,     Nocturia, Dysuria, Testicular problems, Other      Musculoskeletal:  Denies: Joint Pain, Joint Stiffness, Joint Swelling, Myalgias    , Other      Hematologic/lymphatic:  DENIES: Lymphadenopathy, Bruising, Bleeding tendencies,     Other      Neurological:  Denies: Headache, Numbness, Weakness, Seizures, Other      Psychiatric:  Denies: Anxiety, Appropriate Effect, Depression, Other      Sleep:  No: Excessive daytime sleep, Morning Headache?, Snoring, Insomnia?,     Stop breathing at sleep?, Other      Integumentary:  Denies: Rash, Dry  skin, Skin Warm to Touch, Other      Immunologic/Allergic:  Denies: Latex allergy, Seasonal allergies, Asthma,     Urticaria, Eczema, Other      Immunization status:  No: Up to date            FAMILY/SOCIAL/MEDICAL HX      Surgical History:  Yes: CABG (4V CABG ), Cholecystectomy, Vascular Surgery (SEE     ABOVE), No: AAA Repair, Abdominal Surgery, Angioplasty, Appendectomy, Back     Surgery, Bladder Surgery, Bowel Surgery, Breast Surgery, Carotid Stenosis, Ear     Surgery, Eye Surgery, Head Surgery, Hernia Surgery, Kidney Surgery, Nose Surgery    , Oral Surgery, Orthopedic Surgery, Prostatectomy, Rectal Surgery, Spinal     Surgery, Testicular Surgery, Throat Surgery, Valve Replacement, Other Surgeries      Stroke - Family Hx:  Grandparent      Heart - Family Hx:  Mother, Father      Diabetes - Family Hx:  Father      Cancer/Type - Family Hx:  Father      Is Father Still Living?:  Yes      Is Mother Still Living?:  No       Family History:  Yes      Social History:  No Tobacco Use, No Alcohol Use, No Recreational Drug use      Smoking status:  Former smoker (1 PPD FOR 40 YEARS QUIT FOR 6 YEARS)      Anticoagulation Therapy:  No      Antibiotic Prophylaxis:  No      Medical History:  Yes: Arthritis (HIPS, HANDS), Chronic Bronchitis/COPD,     Congestive Heart Failu, Heart Attack, Hemorrhoids/Rectal Prob (REFLUX), High     Blood Pressure (ON MEDS), Shortness Of Breath, No: Alcoholism, Allergies, Anemia    , Asthma, Blood Disease, Broken Bones, Cataracts, Chemical Dependency,     Chemotherapy/Cancer, Emphysema, Chronic Liver Disease, Colon Trouble, Colitis,     Diverticulitis, Deafness or Ringing Ears, Convulsions, Depression, Anxiety,     Bipolar Disorder, Diabetes, Epilepsy, Seizures, Forgetfullness, Glaucoma, Gall     Stones, Gout, Head Injury, Heart Murmur, Hepatitis, Hiatal Hernia, High     Cholesterol, HIV (Do not ask - volu, Jaundice, Kidney or Bladder Disease,     Kidney Stones, Migrane Headaches, Mitral Valve  Prolapse, Night sweats, Phlebitis    , Psychiatric Care, Reflux Disease, Rheumatic Fever, Sexually Transmitted Dis,     Sinus Trouble, Skin Disease/Psoriais/Ecz, Stroke, Thyroid Problem, Tuberculosis     or Pos TB Te, Miscellaneous Medical/oth      Psychiatric History      none            PREVENTION      Hx Influenza Vaccination:  No      Influenza Vaccine Declined:  Yes      2 or More Falls Past Year?:  No      Fall Past Year with Injury?:  No      Hx Pneumococcal Vaccination:  No      Encouraged to follow-up with:  PCP regarding preventative exams.      Chart initiated by      parisa medina/ ma            ALLERGIES/MEDICATIONS      Allergies:        Coded Allergies:             ALBUTEROL (Verified  Allergy, Severe, MOUTH BLISTERS, THROAT SWELLS, 4/11/ 18)           LATEX (Verified  Allergy, Severe, SEVERE BLISTERING, THROAT SWELLS, 4/11/18    )           PENICILLINS (Verified  Allergy, Severe, THROAT SWELLS, 4/11/18)           OXYCODONE (Verified  Adverse Reaction, Unknown, NAUSEA/VOMITING, 4/11/18)      Uncoded Allergies:             IV CONTRAST (Allergy, Severe, THROAT SWELLS, 12/26/17)      Medications    Last Reconciled on 4/11/18 16:27 by CASSIUS MORROW MD      Arformoterol Tartrate (Brovana) 15 Mcg/2 Ml Vial.neb      15 MCG INH RTBID, #60 NEB 5 Refills         Prov: CASSISU MORROW         4/11/18       Neb-Budesonide (Pulmicort) 0.5 Mg/2 Ml Ampul.neb      0.5 MG INH RTBID, #60 NEB 5 Refills         Prov: CASSIUS MORROW         4/11/18       Tiotropium Bromide (Spiriva Respimat 2.5 mcg/Puff) 4 Gm Mist.inhal      2 PUFFS INH RTQDAY, #1 MDI 5 Refills         Prov: CASSIUS MORROW         4/11/18       Potassium Chloride (K-Dur*) 20 Meq Tab.er.prt      20 MEQ PO QDAY for 30 Days, #30 TAB 0 Refills         Prov: Christi Castro PA-C         3/12/18       Atorvastatin (Atorvastatin) 20 Mg Tablet      20 MG PO HS, #30 TAB 0 Refills         Reported         3/9/18       Itraconazole (Sporanox) 100 Mg Cap      200 MG  PO BID, #120 CAP 3 Refills         Prov: CASSIUS MORROW         2/14/18       Ranitidine Hcl (Ranitidine*) 150 Mg Tablet      150 MG PO BID Y for INDIGESTION/HEARTBURN, #60 TAB 0 Refills         Reported         12/31/17       Metoprolol Tartrate (Lopressor*) 25 Mg Tablet      25 MG PO BID, #60 TAB 0 Refills         Reported         12/26/17       NEB-Levalbuterol (LEVALBUTEROL HCL) 1.25 Mg/3 Ml Vial.neb      1.25 MG INH RTTID Y for SHORTNESS OF BREATH/WHEEZING, #90 NEB         Reported         12/26/17       Levalbuterol Tartrate (Xopenex HFA) 15 Gm Hfa.aer.ad      1-2 PUFFS INH RTQ4H, INH         Reported         12/26/17       Furosemide* (Lasix*) 40 Mg Tablet      40 MG PO QDAY Y for SWELLING, #30 TAB 0 Refills         Reported         12/26/17       Clopidogrel Bisulfate (Plavix) 75 Mg Tablet      75 MG PO QDAY, TAB         Reported         12/26/17       Isosorbide Mononitrate ER (Isosorbide Mononitrate ER) 60 Mg Tab.er.24h      60 MG PO BID, TAB.ER.24H         Reported         12/26/17       Aspirin (Aspirin Baby *) 81 Mg Tab.chew      81 MG PO QDAY, #30 TAB.CHEW 0 Refills         Reported         12/26/17      Current Medications      Current Medications Reviewed 4/11/18            EXAM      Vital Signs Reviewed      Gen: WDWN, Alert, NAD.        HEENT:  PERRL, EOMI.  OP, nares clear, no sinus tenderness.      Chest:  Good aeration, clear to auscultation bilaterally, tympanic to     percussion bilaterally, no work of breathing noted.      CV:  RRR, no MGR, pulses 2+, equal.      Abd:  Soft, NT, ND, + BS, no HSM.      EXT:  No clubbing, no cyanosis, no edema.       Neuro:  A  Skin: No rashes or lesions.      Vtials      Vitals:             Height 5 ft 1.00 in / 154.94 cm           Weight 198 lbs 2.000 oz / 89.807519 kg           BSA 2.01 m2           BMI 37.4 kg/m2           Temperature 97.4 F / 36.33 C - Oral           Pulse 72           Respirations 14           Blood Pressure 173/65 Sitting, Right Arm            Pulse Oximetry 97%, roomair            REVIEW      Results Reviewed      PCCS Results Reviewed?:  Yes Prev Lab Results, Yes Prev Radiology Results, Yes     Previous [facility] Records      Lab Results      I personally reviewed the patient's labs from 2018 showing no evidence of     chronic hypercapnic respiratory failure.      Radiographic Results               [facility]                PACS RADIOLOGY REPORT            Patient: SANTOSH PARR   Acct: #[id number]   Report: #[id number]            UNIT #: [id number]    DOS: 18 1342      INSURANCE:[employer]   ORDER #:[id number]      LOCATION:CT     : 1960            PROVIDERS      ADMITTING:     ATTENDING: CASSIUS MORROW      FAMILY:  CHANA JAIME   ORDERING:  CASSIUS MORROW         OTHER:    DICTATING:  Tomasz Chaney MD            REQ #:[id number]   EXAM:CHWO - CT CHEST without CONTRAST      REASON FOR EXAM:  CHRONIC BRONCHITIS      REASON FOR VISIT:  CHRONIC BRONCHITIS            *******Signed******         PROCEDURE:   CT CHEST WITHOUT CONTRAST             COMPARISON:   [facility], CT, CHEST W/O CONTRAST, 2017, 4:    24.             INDICATIONS:   CHRONIC BRONCHITIS             TECHNIQUE:   CT images were created without the administration of contrast     material.               PROTOCOL:     Standard imaging protocol performed                RADIATION:     DLP: 529mGy*cm          Automated exposure control was utilized to minimize radiation dose.              FINDINGS:         Central airways are patent.  The previously identified patchy ground-glass     opacities bilaterally       have resolved.  There is some mild linear opacities at the bases either     atelectasis or residual       scarring.  No suspicious pulmonary nodules are identified.  The thyroid is     normal.  No axillary,       supraclavicular, or significant mediastinal adenopathy  is identified.  Status     post CABG.  Heart is       normal in size.  Limited evaluation the upper abdomen demonstrates post     cholecystectomy changes.        No aggressive appearing lytic or sclerotic bone lesions are identified.             CONCLUSION:         Resolution of previously noted ground-glass opacities.             No acute intrathoracic pathology.              DULCE MONTANEZ MD             Electronically Signed and Approved By: DULCE MONTANEZ MD on 3/09/2018 at 14:31                        Until signed, this is an unconfirmed preliminary report that may contain      errors and is subject to change.                                              DUNS1:      D:03/09/18 1431            PLAN      Assessment      Notes      New Medications      * TIOTROPIUM BROMIDE (Spiriva Respimat 2.5 mcg/Puff) 4 GM MIST.INHAL: 2 PUFFS     INH RTQDAY #1      * Neb-Budesonide (Pulmicort) 0.5 MG/2 ML AMPUL.NEB: 0.5 MG INH RTBID #60       Instructions: dx: copd/ j44.9 npi: [id number]      * ARFORMOTEROL TARTRATE (Brovana) 15 MCG/2 ML VIAL.NEB: 15 MCG INH RTBID #60       Instructions: dx: copd/ j44.9 npi: [id number]      Discontinued Medications      * Tiotropium Br/Olodaterol HCl (Stiolto Respimat Inhal [place]) 4 GM MIST.INHAL:     2 PUFFS INH RTQDAY #1      * Fluticasone (Flovent HFA) 110 MCG INHALER: 2 PUFFS INH RTBID #1       Dx: Elevated IgE level - R76.8      IMPRESSION:      1. Fungal pneumonia resolved.       2. Allergic bronchopulmonary mycosis.        3. Tobacco abuse of cigarettes in remission.       4. Chronic bronchitis. Doing well except for mucous plugging. Would benefit     from airway clearance.       5. Mucous plugging/clearance of airways.              PLAN:      1. Discontinue Stiolto. Start patient on Spiriva Respimat 2.5 two puffs daily.     Inhaler education provided today.       2. Start airway clearance with Brovana and Pulmicort nebulizers twice daily.     Xopenex nebulizers four times a day. Run  flutter valve inline with nebulizers.     Nebulizer and airway clearance education provided.       3. Finish 1 more month of Itraconazole to complete 3 months.       4. Patient is off steroids.       5. Up to date with flu and Pneumovax, we will assess Prevnar at the next office     visit.       6. Follow up in 3 months.            Patient Education      Patient Education Provided:  COPD      Other Education:  airway clearance                 Disclaimer: Converted document may not contain table formatting or lab diagrams. Please see [employer] for the authenticated document.

## 2021-05-28 NOTE — PROGRESS NOTES
Patient: SANTOSH PARR     Acct: [id number]     Report: #[id number]  UNIT #: [id number]     : 1960    Encounter Date:2018  PRIMARY CARE: REGINALDO JAIME  ***Signed***  --------------------------------------------------------------------------------------------------------------------  Chief Complaint      Encounter Date      2018            Referring Provider      SELF,REFERRED PATIENT            Patient Complaint      Patient is complaining of      [facility] follow up            VITALS      Height 5 ft 1 in / 154.94 cm      Weight 196 lbs 3 oz / 88.118274 kg      BSA 2.00 m2      BMI 37.1 kg/m2      Temperature 98.1 F / 36.72 C - Oral      Pulse 74      Respirations 16      Blood Pressure 160/62 Sitting, Right Arm      Pulse Oximetry 98%, ROOM AIR      Exhaled Nitrous Oxide Testin            HPI      The patient is a very pleasant 57 year old  female who was admitted to     the hospital with chronic hypercapnic respiratory failure and multifocal     pneumonia. Bronchoscopy was done and growing mold type fungus for which she was     started on itraconazole therapy.  Connective tissue disease and fungal     serologies are unremarkable with the exception of a possible Candida titer. She     was also positive for influenza A pneumonia.  She completed a course of     Tamiflu.  IgE was also 600.  She was here today for follow up. She was on     Stiolto and Sporanox 100 mg twice a day.  She is slightly worse since last     visit.  She was doing well until she went off steroids.  She has shortness of     breath with exertion.  She walks about 50-60 feet without stopping. She also     has a cough that is dry with no sputum production or hemoptysis. She denies any     nausea, jaundice, abdominal pain.  She has not had follow up imaging or liver     enzymes.  She has never had PFTs.  She is able to perform her ADLs without     difficulty. She denies any swollen glands or lymph nodes  of her head and neck.      She quit smoking six years ago after a 40 pack year smoking history.  We will     are done following imaging, she will need lung cancer screening.            I have personally reviewed the review of systems, past family, social, surgical     and medical histories and I agree with the findings.            ROS      Constitutional:  Complains of: Fatigue, Denies: Fever, Weight gain, Weight loss    , Chills, Insomnia, Other      Respiratory/Breathing:  Complains of: Shortness of air, Wheezing, Cough, Denies    : Hemoptysis, Pleuritic pain, Other      Endocrine:  Denies: Polydipsia, Polyuria, Heat/cold intolerance, Abnorml     menstrual pattern, Diabetes, Other      Eyes:  Denies: Blurred vision, Vision Changes, Other      Ears, nose, mouth, throat:  Denies: Mouth lesions, Thrush, Throat pain,     Hoarseness, Allergies/Hay Fever, Post Nasal Drip, Headaches, Recent Head Injury    , Nose Bleeding, Neck Stiffness, Thyroid Mass, Hearing Loss, Ear Fullness, Dry     Mouth, Nasal or Sinus Pain, Dry Lips, Nasal discharge, Nasal congestion, Other      Cardiovascular:  Denies: Palpitations, Syncope, Claudication, Chest Pain, Wake     up Gasping for air, Leg Swelling, Irregular Heart Rate, Cyanosis, Dyspnea on     Exertion, Other      Gastrointestinal:  Denies: Nausea, Constipation, Diarrhea, Abdominal pain,     Vomiting, Difficulty Swallowing, Reflux/Heartburn, Dysphagia, Jaundice, Bloating    , Melena, Bloody stools, Other      Genitourinary:  Denies: Urinary frequency, Incontinence, Hematuria, Urgency,     Nocturia, Dysuria, Testicular problems, Other      Musculoskeletal:  Denies: Joint Pain, Joint Stiffness, Joint Swelling, Myalgias    , Other      Hematologic/lymphatic:  DENIES: Lymphadenopathy, Bruising, Bleeding tendencies,     Other      Neurological:  Denies: Headache, Numbness, Weakness, Seizures, Other      Psychiatric:  Denies: Anxiety, Appropriate Effect, Depression, Other      Sleep:  No:  Excessive daytime sleep, Morning Headache?, Snoring, Insomnia?,     Stop breathing at sleep?, Other      Integumentary:  Denies: Rash, Dry skin, Skin Warm to Touch, Other      Immunologic/Allergic:  Denies: Latex allergy, Seasonal allergies, Asthma,     Urticaria, Eczema, Other      Immunization status:  No: Up to date            FAMILY/SOCIAL/MEDICAL HX      Surgical History:  Yes: CABG (4V CABG ), Cholecystectomy, Vascular Surgery (SEE     ABOVE), No: AAA Repair, Abdominal Surgery, Angioplasty, Appendectomy, Back     Surgery, Bladder Surgery, Bowel Surgery, Breast Surgery, Carotid Stenosis, Ear     Surgery, Eye Surgery, Head Surgery, Hernia Surgery, Kidney Surgery, Nose Surgery    , Oral Surgery, Orthopedic Surgery, Prostatectomy, Rectal Surgery, Spinal     Surgery, Testicular Surgery, Throat Surgery, Valve Replacement, Other Surgeries      Stroke - Family Hx:  Grandparent      Heart - Family Hx:  Mother, Father      Diabetes - Family Hx:  Father      Cancer/Type - Family Hx:  Father      Is Father Still Living?:  Yes      Is Mother Still Living?:  No      Social History:  No Tobacco Use, No Alcohol Use, No Recreational Drug use      Smoking status:  Former smoker (1 PPD FOR 40 YEARS QUIT FOR 6 YEARS)      Anticoagulation Therapy:  No      Antibiotic Prophylaxis:  No      Medical History:  Yes: Arthritis (HIPS, HANDS), Chronic Bronchitis/COPD,     Congestive Heart Failu, Heart Attack, Hemorrhoids/Rectal Prob (REFLUX), High     Blood Pressure (ON MEDS), Shortness Of Breath, No: Alcoholism, Allergies, Anemia    , Asthma, Blood Disease, Broken Bones, Cataracts, Chemical Dependency,     Chemotherapy/Cancer, Emphysema, Chronic Liver Disease, Colon Trouble, Colitis,     Diverticulitis, Deafness or Ringing Ears, Convulsions, Depression, Anxiety,     Bipolar Disorder, Diabetes, Epilepsy, Seizures, Forgetfullness, Glaucoma, Gall     Stones, Gout, Head Injury, Heart Murmur, Hepatitis, Hiatal Hernia, High     Cholesterol,  HIV (Do not ask - volu, Jaundice, Kidney or Bladder Disease,     Kidney Stones, Migrane Headaches, Mitral Valve Prolapse, Night sweats, Phlebitis    , Psychiatric Care, Reflux Disease, Rheumatic Fever, Sexually Transmitted Dis,     Sinus Trouble, Skin Disease/Psoriais/Ecz, Stroke, Thyroid Problem, Tuberculosis     or Pos TB Te, Miscellaneous Medical/oth            Hx Influenza Vaccination:  No      Influenza Vaccine Declined:  Yes      2 or More Falls Past Year?:  No      Fall Past Year with Injury?:  No      Hx Pneumococcal Vaccination:  No      Encouraged to follow-up with:  PCP regarding preventative exams.      Chart initiated by      abiola jaquez ma            ALLERGIES/MEDICATIONS      Allergies:        Coded Allergies:             ALBUTEROL (Verified  Allergy, Severe, MOUTH BLISTERS, THROAT SWELLS, 2/14/ 18)           LATEX (Verified  Allergy, Severe, SEVERE BLISTERING, THROAT SWELLS, 2/14/18    )           PENICILLINS (Verified  Allergy, Severe, THROAT SWELLS, 2/14/18)           OXYCODONE (Verified  Adverse Reaction, Unknown, NAUSEA/VOMITING, 2/14/18)      Uncoded Allergies:             IV CONTRAST (Allergy, Severe, THROAT SWELLS, 12/26/17)      Medications    Last Reconciled on 2/14/18 16:24 by CASSIUS MORROW MD      predniSONE* (predniSONE*) 20 Mg Tablet      40 MG PO ASDIR, #120 TAB 0 Refills         Prov: CASSIUS MORROW         2/14/18       Itraconazole (Sporanox) 100 Mg Cap      200 MG PO BID, #120 CAP 3 Refills         Prov: CASSIUS MORROW         2/14/18       Tiotropium Br/Olodaterol HCl (Stiolto Respimat Inhal Spray) 4 Gm Mist.inhal      2 PUFFS INH RTQDAY, #1 INH 0 Refills         Prov: Yordan Ramírez         1/4/18       Guaifenesin (Humibid La*) 600 Mg Tab.er.12h      1200 MG PO BID, #40 TAB.ER 0 Refills         Prov: Yordan Ramírez         1/4/18       Ranitidine Hcl (Ranitidine*) 150 Mg Tablet      150 MG PO BID Y for INDIGESTION/HEARTBURN, #60 TAB 0 Refills         Reported          12/31/17       Metoprolol Tartrate (Lopressor*) 25 Mg Tablet      25 MG PO BID, #60 TAB 0 Refills         Reported         12/26/17       NEB-Levalbuterol (LEVALBUTEROL HCL) 1.25 Mg/3 Ml Vial.neb      1.25 MG INH RTTID Y for SHORTNESS OF BREATH/WHEEZING, #90 NEB         Reported         12/26/17       Atorvastatin (Atorvastatin) 80 Mg Tablet      80 MG PO HS, #30 TAB 0 Refills         Reported         12/26/17       Levalbuterol Tartrate (Xopenex HFA) 15 Gm Hfa.aer.ad      1-2 PUFFS INH RTQ4H, INH         Reported         12/26/17       Furosemide* (Lasix*) 40 Mg Tablet      40 MG PO QDAY Y for SWELLING, #30 TAB 0 Refills         Reported         12/26/17       Clopidogrel Bisulfate (Plavix) 75 Mg Tablet      75 MG PO QDAY, TAB         Reported         12/26/17       Isosorbide Mononitrate ER (Isosorbide Mononitrate ER) 60 Mg Tab.er.24h      60 MG PO BID, TAB.ER.24H         Reported         12/26/17       Aspirin (Aspirin Baby *) 81 Mg Tab.chew      81 MG PO QDAY, #30 TAB.CHEW 0 Refills         Reported         12/26/17      Current Medications      Current Medications Reviewed 2/14/18            EXAM      Vital Signs Reviewed.      General:  WDWN, Alert, NAD.      HEENT: PERRL, EOMI.  OP, nares clear, no sinus tenderness.      Neck: Supple, no JVD, no thyromegaly.      Lymph: No axillary, cervical, supraclavicular lymphadenopathy noted bilaterally.      Chest: Good aeration, diffuse expiratory wheezing noted bilaterally, tympanic     to percussion bilaterally, no work of breathing noted.      CV: RRR, no MGR, pulses 2+, equal.        Abd: Soft, NT, ND, +BS, no HSM.      EXT: No clubbing, no cyanosis, no edema, no joint tenderness.        Neuro:  A  Skin: No rashes or lesions.      Vtials      Vitals:             Height 5 ft 1 in / 154.94 cm           Weight 196 lbs 3 oz / 88.385789 kg           BSA 2.00 m2           BMI 37.1 kg/m2           Temperature 98.1 F / 36.72 C - Oral           Pulse 74            Respirations 16           Blood Pressure 160/62 Sitting, Right Arm           Pulse Oximetry 98%, ROOM AIR            REVIEW      Results Reviewed      PCCS Results Reviewed?:  Yes Prev Lab Results, Yes Prev Radiology Results, Yes     Previous [facility] Records      Lab Results      I reviewed our consult notes and progress notes and I reviewed the patient's     discharge summary. I reviewed my bronchoscopy note from 2017.  I reviewed     bronchoscopy cultures and pathology growing multi-fungus. Reviewed connective     tissue serologies that were unremarkable, viral serologies positive for     influenza A, beta D glucan was positive.  IgE level was 640 and other     immunoglobulins were negative. RAST for aspergillus was negative.              Patient: SANTOSH PARR   Acct #: [id number]         Report #: [id number]   : 1960 Report #: [id number]      MR #: [id number]   Location: [facility]  [facility]                              ***Signed***      Procedure Note      Procedure name bronchoscopy with bronchoalveolar lavage            Indication multifocal pneumonia and non-resolving            Sedation-IV MAC per anesthesia service            Procedure details      Patient was brought back to the bronchoscopy suite a bite block was placed in     the oral cavity, and a therapeutic bronchoscope was then used to intubate the     trachea, the vocal cords inspected and appeared to have normal motion with     inhalation and ventilation, inspection was performed of the left     tracheobronchial tree and there were no endobronchial lesion seen to the     segmental level, inspection of the right tracheobronchial tree showed     endobronchial lesions to the segmental level.  A bronchoalveolar lavage was     obtained from lower lobe bronchus.            Patient tolerated procedure well complications            Plan      Follow up results of microbiology            Procedure(s) Performed      Bronchoscopy:  39529 Dx  Bronch/Lavage            Joshua Islas Dec 29, 2017 15:44               <Electronically signed by Joshua Islas DO>  17 1544      Micro Results      Patient:  SANTOSH PARR   Acct:  # [id number]   Report:  # [id number]      UNIT #: [id number]      Admit/Service Date: 17      LOCATION:   [address] [place] [address]   : 1960            ****  [id number] ***                                                  [id number]      PATIENT       SANTOSH PARR      NAME:      MRN:          [id number]            FINAL CYTOLOGIC INTERPRETATION:      Lung, right upper lobe, BAL:       - Negative for malignant cells       - Acute inflammation       - No viral inclusions            JAC1:LAD            MICROSCOPIC DESCRIPTION: Microscopic examination performed.            CLINICAL INFORMATION: RECURRENT PNEUMONIA - NON-RESOLVING.            SPECIMEN(S) SUBMITTED:            BRONCHIOALVEOLAR LAVAGE, RIGHT UPPER LOBE LUNG      Specimen Description: 18 ml of thin fluid.   HAZY, BLOOD-TINGED.      Materials Prepared and Examined: 1 cytospin                                                             <Sign Out Dr. 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            Notes      New Medications      * Itraconazole (Sporanox) 100 MG CAP: 200 MG PO BID #120      * predniSONE* 20 MG TABLET: 40 MG PO ASDIR #120       Instructions: 40mg qay x 30 days, 30mg qday x 30 days, 20mg qday x 30 days,      10mg qday x 30 days then stop       Dx: Chronic bronchitis - J42      Discontinued Medications      * Oseltamivir Phosphate (Tamiflu*) 75 MG CAPSULE: 75 MG PO BID #4       Instructions: Start on 1/5/18      * Itraconazole 100 MG CAPSULE: 100 MG PO BID #180      New Diagnostics      * 6 Min Walk With Pulse Ox, Routine       Dx: Chronic bronchitis - J42      * PFT-Comp, PrePost,DLCO,BodyBox, Week       Dx: Chronic bronchitis - J42      * Chest W/O Cont CT, Month       Dx: Chronic bronchitis - J42      * CBC, Month       Dx: Chronic bronchitis - J42      * Comp Metabolic Panel, Month       Dx: Chronic bronchitis - J42      * Itraconazole Level, Month       Dx: Chronic bronchitis - J42      * Immunoglobulin  E (I, Month       Dx: Chronic bronchitis - J42      IMPRESSION:      1.  Influenza A pneumonia, resolved.      2.  Chronic hypercapnic respiratory failure, resolved.      3. Fungal pneumonia.      4.  Allergic bronchopulmonary mycosis.      5.  Elevated IgE.      6. Tobacco abuse with cigarettes in remission.      7. Chronic bronchitis, no FEV1 on record.       8. Therapeutic drug monitoring with itraconazole.               PLAN:      1.   The patient had findings consistent with fungal pneumonia with resulting     allergic bronchopulmonary mycosis.  She would benefit from adjustment of her     itraconazole dose and steroids.       2.   I will increase itraconazole to 200 mg twice a day to complete three     months of therapy.      3. We will do prednisone taper at 40 mg daily and decrease by 10 mg every 30     days until patient is off steroids.      4. We will check pulmonary function tests and six minute walk test to check for     airflow obstruction and bronchodilator response as well as exertional hypoxemia.      5. Check noncontrasted chest CT in one month for a three month follow up to     assess for resolution of lung nodules and infiltrates.        6. Check CBC, CMP and itraconazole level in one month for  therapeutic drug     monitoring and adjust dose correctly.      7. Repeat IgE to assess response to therapy.      8.  Up-to-date with flu vaccine and Pneumovax. Prevnar when she is 66.      9.  Follow up in one month.        10.  I performed exhale nitric oxide testing in the office today.  Level of 7     indicative of no eosinophilic airway inflammation.            Patient Education            Patient Education:        Chronic Bronchitis      Other Education:  fungal pneumonia. ABPM                 Disclaimer: Converted document may not contain table formatting or lab diagrams. Please see [employer] for the authenticated document.

## 2021-10-18 NOTE — PROGRESS NOTES
RM:________     PCP: Andrea Salazar MD    : 1960  AGE: 61 y.o.  EST PATIENT   REASON FOR VISIT/  CC:    BP Readings from Last 3 Encounters:   21 142/66   20 142/82   20 160/80        WT: ____________ BP: __________L __________R HR______    CHEST PAIN: _____________    SOA: _____________PALPS: _______________     LIGHTHEADED: ___________FATIGUE: ________________ EDEMA __________    ALLERGIES:Albuterol, Amlodipine, Latex, Penicillins, Prednisone, and Influenza vaccines SMOKING HISTORY:  Social History     Tobacco Use   • Smoking status: Former Smoker     Years: 45.00     Quit date: 2018     Years since quitting: 3.3   • Smokeless tobacco: Never Used   Vaping Use   • Vaping Use: Never used   Substance Use Topics   • Alcohol use: No     Comment: CAFFEINE USE: SOFT DRINKS OCCASIONALLY.    • Drug use: No     CAFFEINE USE_________________  ALCOHOL ______________________    Below is the patient's most recent value for Albumin, ALT, AST, BUN, Calcium, Chloride, Cholesterol, CO2, Creatinine, GFR, Glucose, HDL, Hematocrit, Hemoglobin, Hemoglobin A1C, LDL, Magnesium, Phosphorus, Platelets, Potassium, PSA, Sodium, Triglycerides, TSH and WBC.   Lab Results   Component Value Date    ALBUMIN 4.20 2019    ALT 17 2019    AST 18 2019    BUN 15 2019    CALCIUM 9.5 2019    CL 98 2019    CO2 25.1 2019    CREATININE 1.20 2019     (H) 2019    HCT 40.2 2019    HGB 12.8 2019     2019    K 3.7 2019     (L) 2019    WBC 6.58 2019          NEW DIAGNOSIS/ SURGERY/ HOSP OR ED VISITS: ______________________    __________________________________________________________________      RECENT LABS OR DIAGNOSTIC TESTING:  _____________________________    __________________________________________________________________      ASSESSMENT/ PLAN:  _______________________________________________    __________________________________________________________________

## 2021-10-21 ENCOUNTER — OFFICE VISIT (OUTPATIENT)
Dept: CARDIOLOGY | Facility: CLINIC | Age: 61
End: 2021-10-21

## 2021-10-21 VITALS
HEIGHT: 61 IN | WEIGHT: 203.2 LBS | BODY MASS INDEX: 38.36 KG/M2 | SYSTOLIC BLOOD PRESSURE: 138 MMHG | DIASTOLIC BLOOD PRESSURE: 66 MMHG | HEART RATE: 87 BPM

## 2021-10-21 DIAGNOSIS — I73.9 PAD (PERIPHERAL ARTERY DISEASE) (HCC): ICD-10-CM

## 2021-10-21 DIAGNOSIS — I25.118 CORONARY ARTERY DISEASE OF NATIVE ARTERY OF NATIVE HEART WITH STABLE ANGINA PECTORIS (HCC): Primary | ICD-10-CM

## 2021-10-21 DIAGNOSIS — I10 ESSENTIAL HYPERTENSION: ICD-10-CM

## 2021-10-21 PROCEDURE — 99213 OFFICE O/P EST LOW 20 MIN: CPT | Performed by: INTERNAL MEDICINE

## 2021-10-21 PROCEDURE — 93000 ELECTROCARDIOGRAM COMPLETE: CPT | Performed by: INTERNAL MEDICINE

## 2021-10-21 RX ORDER — ONDANSETRON 4 MG/1
TABLET, FILM COATED ORAL AS NEEDED
COMMUNITY

## 2021-10-21 RX ORDER — PANTOPRAZOLE SODIUM 40 MG/1
40 TABLET, DELAYED RELEASE ORAL DAILY
COMMUNITY

## 2021-10-21 NOTE — PROGRESS NOTES
"Date of Office Visit: 10/21/2021  Encounter Provider: Demond Owen MD  Place of Service: [facility] CARDIOLOGY  Patient Name: Shaneka Guido  :1960    Chief Complaint   Patient presents with   • Coronary Artery Disease   :     HPI: Shaneka Guido is a 61 y.o. female who presents today to follow up. I have reviewed prior notes and there are no changes except for any new updates described below. I have also reviewed any information entered into the medical record by the patient or by ancillary staff.     She established care with me in 2019.  She has lived in many cities and even in [place], and has seen cardiologists at [place], in [place], and in [place].      All of her siblings and her mother have had severe CAD and PAD.  At the age of 35, she underwent aortobifemoral bypass.  At age 45, she woke up with an acute ischemic right leg and required urgent surgical revascularization.  She had an arterial doppler in  that reported left subclavian stenosis; she was unaware of this diagnosis, but did say that her blood pressure shouldn't be checked in that arm as it's \"too low.\"  She has a history of carotid arterial disease as well .    In , she presented with chest pain that she described as a feeling of \"ice\" in her chest.  She was found to have an acute anterior MI.  She underwent emergent BMS to the LAD and then underwent CABG the next day (details are in HPI).  Her LVEF was 50%.  She has had angiograms since then, but they've been difficult due to access issues.    An echo in  reported an LVEF of 40-45% with regional wall motion abnormalities (although it did not report which walls were abnormal) and normal valves.      When I met her, she reported recurrence of the sensation of \"ice\" in her chest. A PET stress reported a large amount of inferior ischemia.  She then underwent coronary angiography in May 2019.  The vein grafts to the OM1, OM2, Cx, " and RCA were all occluded, as was the native RCA.  The LAD had mild diffuse disease and a patent stent, and the LIMA backfilled the LAD.  The proximal subclavian was totally occluded.   She was started on ranolazine but had to stop it due to lightheadedness and fatigue. I then ordered amlodipine but she opted not to take it. She then established care with Dr. Landin.  She underwent bilateral angioplasty of the profunda.  She had carotid studies which showed 60-70% stenosis on the right and 50-60% stenosis on the left with retrograde vertebral filling consistent with known subclavian occlusion.    Her cardiac symptoms are stable and her claudication has improved. She denies chest pain.  She has chronic mild exertional dyspnea and chronic fatigue.  She has lost a bit of weight . She sees Dr Landin for her PAD.     Past Medical History:   Diagnosis Date   • Anxiety disorder    • CAD (coronary artery disease)     anterior MI 2012, BMS to LAD, then CABG x 4 (LIMA-LAD, Y SVG-OM1-OM2, SVG-rPDA)   • Chronic bronchitis ([facility])    • Chronic constipation    • Chronic diastolic (congestive) heart failure ([facility])    • COPD (chronic obstructive pulmonary disease) ([facility])    • Eczema of face    • Esophageal stricture    • Essential hypertension    • Generalized headaches    • GERD (gastroesophageal reflux disease)    • Hemorrhoids    • History of tobacco use    • Hyperlipidemia    • Incontinence    • Leg edema, left    • On anticoagulant therapy    • Osteoarthritis    • PAD (peripheral artery disease) ([facility])    • Panic attacks    • PTSD (post-traumatic stress disorder)     per patient    • Subclavian artery stenosis, left (HCC)     BP should not be checked in that arm       Past Surgical History:   Procedure Laterality Date   • ANGIOPLASTY     • BRONCHOSCOPY     • CARDIAC CATHETERIZATION N/A 5/8/2019    Procedure: Coronary angiography;  Surgeon: Nickie Zhu MD;  Location: [facility] CATH INVASIVE LOCATION;  Service: Cardiovascular   • CARDIAC  CATHETERIZATION N/A 5/8/2019    Procedure: Left heart cath;  Surgeon: Nickie Zhu MD;  Location:  [facility] CATH INVASIVE LOCATION;  Service: Cardiovascular   • CARDIAC CATHETERIZATION N/A 5/8/2019    Procedure: Left ventriculography;  Surgeon: Nickie Zhu MD;  Location:  [facility] CATH INVASIVE LOCATION;  Service: Cardiovascular   • CARDIAC CATHETERIZATION N/A 5/8/2019    Procedure: Bypass graft study;  Surgeon: Nickie Zhu MD;  Location:  [facility] CATH INVASIVE LOCATION;  Service: Cardiovascular   • CHOLECYSTECTOMY     • CORONARY ARTERY BYPASS GRAFT  2012   • HYSTERECTOMY     • LAPAROSCOPIC TUBAL LIGATION         Social History     Socioeconomic History   • Marital status: Single   Tobacco Use   • Smoking status: Former Smoker     Years: 45.00     Quit date: 6/17/2018     Years since quitting: 3.3   • Smokeless tobacco: Never Used   Vaping Use   • Vaping Use: Never used   Substance and Sexual Activity   • Alcohol use: No     Comment: CAFFEINE USE: SOFT DRINKS OCCASIONALLY.    • Drug use: No   • Sexual activity: Defer       Family History   Problem Relation Age of Onset   • Chronic bronchitis Mother    • Heart disease Mother    • Hypertension Mother    • Osteoporosis Mother    • Cancer Father    • Heart disease Father    • Diabetes Father    • Hypertension Sister    • Osteoporosis Sister    • Asthma Daughter    • Hypertension Daughter    • Osteoporosis Daughter    • Hypertension Son    • Chronic bronchitis Son    • Malig Hyperthermia Neg Hx        Review of Systems   Constitutional: Positive for malaise/fatigue.   Cardiovascular: Positive for claudication, dyspnea on exertion and leg swelling.   Respiratory: Positive for shortness of breath.    Endocrine: Positive for cold intolerance and heat intolerance.   Musculoskeletal: Positive for joint pain and myalgias.   Gastrointestinal: Positive for nausea.   Neurological: Positive for excessive daytime sleepiness, dizziness and headaches.   Psychiatric/Behavioral:  "Positive for depression. The patient is nervous/anxious.    All other systems reviewed and are negative.      Allergies   Allergen Reactions   • Albuterol Other (See Comments)     Mouth blisters, throat swells   • Amlodipine Swelling   • Latex Other (See Comments)     Severe blistering, throat swells   • Penicillins Other (See Comments)     Throat swells   • Prednisone Other (See Comments)     Fluid retention.    • Influenza Vaccines Rash         Current Outpatient Medications:   •  aspirin 81 MG EC tablet, Take 81 mg by mouth Daily., Disp: , Rfl:   •  atorvastatin (LIPITOR) 20 MG tablet, Take 20 mg by mouth Daily., Disp: , Rfl:   •  clopidogrel (PLAVIX) 75 MG tablet, Take 75 mg by mouth Daily., Disp: , Rfl: 2  •  furosemide (LASIX) 40 MG tablet, Take 40 mg by mouth As Needed., Disp: , Rfl:   •  hydroCHLOROthiazide (HYDRODIURIL) 25 MG tablet, Take 1 tablet by mouth Daily., Disp: 90 tablet, Rfl: 3  •  isosorbide mononitrate (IMDUR) 60 MG 24 hr tablet, Take 60 mg by mouth 2 (Two) Times a Day., Disp: , Rfl:   •  levalbuterol (XOPENEX HFA) 45 MCG/ACT inhaler, Inhale 1-2 puffs Every 4 (Four) Hours As Needed for Wheezing., Disp: , Rfl:   •  levalbuterol (XOPENEX) 1.25 MG/3ML nebulizer solution, Take 1 ampule by nebulization Every 4 (Four) Hours As Needed for Wheezing., Disp: , Rfl:   •  metoprolol tartrate (LOPRESSOR) 25 MG tablet, Take 25 mg by mouth 2 (Two) Times a Day., Disp: , Rfl: 2  •  pantoprazole (PROTONIX) 40 MG EC tablet, Take 40 mg by mouth Daily., Disp: , Rfl:   •  tiotropium bromide-olodaterol (STIOLTO RESPIMAT) 2.5-2.5 MCG/ACT aerosol solution inhaler, Inhale 2 puffs Daily., Disp: , Rfl:   •  ondansetron (ZOFRAN) 4 MG tablet, As Needed., Disp: , Rfl:       Objective:     Vitals:    10/21/21 1151   BP: 138/66   BP Location: Right arm   Pulse: 87   Weight: 92.2 kg (203 lb 3.2 oz)   Height: 154.9 cm (61\")     Body mass index is 38.39 kg/m².    Physical Exam  Vitals reviewed.   Constitutional:       Comments: " Obese   HENT:      Head: Normocephalic.      Nose: Nose normal.      Mouth/Throat:      Dentition: Abnormal dentition.      Comments: Masked  Eyes:      Conjunctiva/sclera: Conjunctivae normal.   Neck:      Vascular: No JVD.      Comments: Cannot assess for JVD due to habitus  Cardiovascular:      Rate and Rhythm: Normal rate and regular rhythm.      Pulses: Decreased pulses.      Heart sounds: Normal heart sounds. No murmur heard.      Pulmonary:      Effort: Pulmonary effort is normal.      Breath sounds: Normal breath sounds.   Abdominal:      Palpations: Abdomen is soft.      Tenderness: There is no abdominal tenderness.      Comments: Obesity limits abdominal exam   Musculoskeletal:         General: Normal range of motion.      Cervical back: Normal range of motion.   Skin:     General: Skin is warm and dry.      Findings: No rash.   Neurological:      General: No focal deficit present.      Mental Status: She is alert and oriented to person, place, and time.      Cranial Nerves: No cranial nerve deficit.   Psychiatric:         Mood and Affect: Mood normal.         Behavior: Behavior normal.         Thought Content: Thought content normal.           ECG 12 Lead    Date/Time: 10/21/2021 12:03 PM  Performed by: Demond Owen MD  Authorized by: Demond Owen MD   Comparison: compared with previous ECG   Similar to previous ECG  Rhythm: sinus rhythm  Conduction: conduction normal  T flattening: all  QRS axis: normal  Other findings: non-specific ST-T wave changes    Clinical impression: non-specific ECG              Assessment:       Diagnosis Plan   1. Coronary artery disease of native artery of native heart with stable angina pectoris (HCC)     2. PAD (peripheral artery disease) (HCC)     3. Essential hypertension            Plan:       1.  Coronary Artery Disease  Assessment  • The patient has CCS class I - angina only during strenuous or prolonged physical activity    Subjective - Objective  • There is a  history of past MI 2012  • There is a history of previous coronary artery bypass graft 2012  • There has been a previous stent procedure using BMS 2012  • Current antiplatelet therapy includes aspirin 81 mg and clopidogrel 75 mg    She's on clopidogrel and aspirin due to to PAD, and is on atorvastatin.  She feels that her symptoms are stable.  She's on metoprolol and ISMN. All of her grafts are occluded to her RCA/LCx as are the native vessels; she is living off a LIMA that is supplied by an occluded left subclavian (so she's stealing from her vertebrals), but her native LAD is patent as well.   She does not tolerate ranolazine.     2.  She has complex vascular disease which is followed by Dr. Landin. She's on DAPT.      3. Her BP is within goal. She checks it at home and her SBP is 120-130mm Hg.     Sincerely,       Demond Owen MD

## 2022-04-05 RX ORDER — HYDROCHLOROTHIAZIDE 25 MG/1
25 TABLET ORAL DAILY
Qty: 90 TABLET | Refills: 3 | Status: SHIPPED | OUTPATIENT
Start: 2022-04-05

## 2022-10-26 ENCOUNTER — OFFICE VISIT (OUTPATIENT)
Dept: CARDIOLOGY | Facility: CLINIC | Age: 62
End: 2022-10-26

## 2022-10-26 VITALS
WEIGHT: 202 LBS | OXYGEN SATURATION: 95 % | DIASTOLIC BLOOD PRESSURE: 76 MMHG | HEIGHT: 61 IN | SYSTOLIC BLOOD PRESSURE: 132 MMHG | HEART RATE: 88 BPM | BODY MASS INDEX: 38.14 KG/M2

## 2022-10-26 DIAGNOSIS — I25.118 CORONARY ARTERY DISEASE OF NATIVE ARTERY OF NATIVE HEART WITH STABLE ANGINA PECTORIS: Primary | ICD-10-CM

## 2022-10-26 DIAGNOSIS — E78.2 MIXED HYPERLIPIDEMIA: ICD-10-CM

## 2022-10-26 DIAGNOSIS — I10 ESSENTIAL HYPERTENSION: ICD-10-CM

## 2022-10-26 DIAGNOSIS — I73.9 PAD (PERIPHERAL ARTERY DISEASE): ICD-10-CM

## 2022-10-26 PROCEDURE — 93000 ELECTROCARDIOGRAM COMPLETE: CPT | Performed by: INTERNAL MEDICINE

## 2022-10-26 PROCEDURE — 99214 OFFICE O/P EST MOD 30 MIN: CPT | Performed by: INTERNAL MEDICINE

## 2022-10-26 NOTE — PROGRESS NOTES
"Date of Office Visit: 10/26/2022  Encounter Provider: Demond Owen MD  Place of Service: [facility] CARDIOLOGY  Patient Name: Shaneka Guido  :1960    Chief Complaint   Patient presents with   • Follow-up   :   HPI: Shaneka Guido is a 62 y.o. female who presents today to follow up. I have reviewed prior notes and there are no changes except for any new updates described below. I have also reviewed any information entered into the medical record by the patient or by ancillary staff.     She established care with me in 2019.  She has lived in many cities and even in [place], and has seen cardiologists at [place], in [place], and in [place].      All of her siblings and her mother have had severe CAD and PAD.  At the age of 35, she underwent aortobifemoral bypass.  At age 45, she woke up with an acute ischemic right leg and required urgent surgical revascularization.  She had an arterial doppler in  that reported left subclavian stenosis; she was unaware of this diagnosis, but did say that her blood pressure shouldn't be checked in that arm as it's \"too low.\"  She has a history of carotid arterial disease as well .    In , she presented with chest pain that she described as a feeling of \"ice\" in her chest.  She was found to have an acute anterior MI.  She underwent emergent BMS to the LAD and then underwent CABG the next day (details are in HPI).  Her LVEF was 50%.  She has had angiograms since then, but they've been difficult due to access issues.    An echo in  reported an LVEF of 40-45% with regional wall motion abnormalities (although it did not report which walls were abnormal) and normal valves.      When I met her, she reported recurrence of the sensation of \"ice\" in her chest. A PET stress reported a large amount of inferior ischemia.  She then underwent coronary angiography in May 2019.  The vein grafts to the OM1, OM2, Cx, and RCA were " all occluded, as was the native RCA.  The LAD had mild diffuse disease and a patent stent, and the LIMA backfilled the LAD.  The proximal subclavian was totally occluded.   She was started on ranolazine but had to stop it due to lightheadedness and fatigue. I then ordered amlodipine but she opted not to take it. She then established care with Dr. Landin.  She underwent bilateral angioplasty of the profunda.  She had carotid studies which showed 60-70% stenosis on the right and 50-60% stenosis on the left with retrograde vertebral filling consistent with known subclavian occlusion.    Her cardiac symptoms are stable and her claudication has improved. She sees Dr Landin for her PAD. She denies chest pain.  She has chronic mild exertional dyspnea and chronic fatigue. Over the last few weeks, she's noticed intermittent episodes of a pounding/hard heart beat associated with elevated blood pressure. Her pulse is ~100 bpm when this happens.      Past Medical History:   Diagnosis Date   • Anxiety disorder    • CAD (coronary artery disease)     anterior MI 2012, BMS to LAD, then CABG x 4 (LIMA-LAD, Y SVG-OM1-OM2, SVG-rPDA)   • Chronic bronchitis (HCC)    • Chronic constipation    • Chronic diastolic (congestive) heart failure (HCC)    • COPD (chronic obstructive pulmonary disease) (HCC)    • Eczema of face    • Esophageal stricture    • Essential hypertension    • Generalized headaches    • GERD (gastroesophageal reflux disease)    • Hemorrhoids    • History of tobacco use    • Hyperlipidemia    • Incontinence    • Leg edema, left    • On anticoagulant therapy    • Osteoarthritis    • PAD (peripheral artery disease) ([facility])    • Panic attacks    • PTSD (post-traumatic stress disorder)     per patient    • Subclavian artery stenosis, left (HCC)     BP should not be checked in that arm       Past Surgical History:   Procedure Laterality Date   • ANGIOPLASTY     • BRONCHOSCOPY     • CARDIAC CATHETERIZATION N/A 5/8/2019    Procedure:  Coronary angiography;  Surgeon: Nickie Zhu MD;  Location:  [facility] CATH INVASIVE LOCATION;  Service: Cardiovascular   • CARDIAC CATHETERIZATION N/A 2019    Procedure: Left heart cath;  Surgeon: Nickie Zhu MD;  Location:  [facility] CATH INVASIVE LOCATION;  Service: Cardiovascular   • CARDIAC CATHETERIZATION N/A 2019    Procedure: Left ventriculography;  Surgeon: Nickie Zhu MD;  Location:  [facility] CATH INVASIVE LOCATION;  Service: Cardiovascular   • CARDIAC CATHETERIZATION N/A 2019    Procedure: Bypass graft study;  Surgeon: Nickie Zhu MD;  Location:  [facility] CATH INVASIVE LOCATION;  Service: Cardiovascular   • CHOLECYSTECTOMY     • CORONARY ARTERY BYPASS GRAFT     • HYSTERECTOMY     • LAPAROSCOPIC TUBAL LIGATION         Social History     Socioeconomic History   • Marital status: Single   Tobacco Use   • Smoking status: Former     Years: 45.00     Types: Cigarettes     Quit date: 2018     Years since quittin.3   • Smokeless tobacco: Never   Vaping Use   • Vaping Use: Never used   Substance and Sexual Activity   • Alcohol use: No     Comment: CAFFEINE USE: SOFT DRINKS OCCASIONALLY.    • Drug use: No   • Sexual activity: Defer       Family History   Problem Relation Age of Onset   • Chronic bronchitis Mother    • Heart disease Mother    • Hypertension Mother    • Osteoporosis Mother    • Cancer Father    • Heart disease Father    • Diabetes Father    • Hypertension Sister    • Osteoporosis Sister    • Asthma Daughter    • Hypertension Daughter    • Osteoporosis Daughter    • Hypertension Son    • Chronic bronchitis Son    • Malig Hyperthermia Neg Hx        Review of Systems   Constitutional: Positive for malaise/fatigue.   Cardiovascular: Positive for claudication, dyspnea on exertion and leg swelling.   Respiratory: Positive for shortness of breath.    Endocrine: Positive for cold intolerance and heat intolerance.   Musculoskeletal: Positive for joint pain and myalgias.    Gastrointestinal: Positive for nausea.   Neurological: Positive for excessive daytime sleepiness, dizziness and headaches.   Psychiatric/Behavioral: Positive for depression. The patient is nervous/anxious.    All other systems reviewed and are negative.      Allergies   Allergen Reactions   • Albuterol Other (See Comments)     Mouth blisters, throat swells   • Amlodipine Swelling   • Latex Other (See Comments)     Severe blistering, throat swells   • Penicillins Other (See Comments)     Throat swells   • Prednisone Other (See Comments)     Fluid retention.    • Influenza Vaccines Rash         Current Outpatient Medications:   •  aspirin 81 MG EC tablet, Take 81 mg by mouth Daily., Disp: , Rfl:   •  atorvastatin (LIPITOR) 20 MG tablet, Take 20 mg by mouth Daily., Disp: , Rfl:   •  clopidogrel (PLAVIX) 75 MG tablet, Take 75 mg by mouth Daily., Disp: , Rfl: 2  •  furosemide (LASIX) 40 MG tablet, Take 40 mg by mouth As Needed., Disp: , Rfl:   •  hydroCHLOROthiazide (HYDRODIURIL) 25 MG tablet, Take 1 tablet by mouth Daily., Disp: 90 tablet, Rfl: 3  •  isosorbide mononitrate (IMDUR) 60 MG 24 hr tablet, Take 60 mg by mouth 2 (Two) Times a Day., Disp: , Rfl:   •  levalbuterol (XOPENEX HFA) 45 MCG/ACT inhaler, Inhale 1-2 puffs Every 4 (Four) Hours As Needed for Wheezing., Disp: , Rfl:   •  levalbuterol (XOPENEX) 1.25 MG/3ML nebulizer solution, Take 1 ampule by nebulization Every 4 (Four) Hours As Needed for Wheezing., Disp: , Rfl:   •  metoprolol tartrate (LOPRESSOR) 25 MG tablet, Take 25 mg by mouth 2 (Two) Times a Day., Disp: , Rfl: 2  •  ondansetron (ZOFRAN) 4 MG tablet, As Needed., Disp: , Rfl:   •  pantoprazole (PROTONIX) 40 MG EC tablet, Take 40 mg by mouth Daily., Disp: , Rfl:   •  tiotropium bromide-olodaterol (STIOLTO RESPIMAT) 2.5-2.5 MCG/ACT aerosol solution inhaler, Inhale 2 puffs Daily., Disp: , Rfl:       Objective:     Vitals:    10/26/22 1037   BP: 132/76   Pulse: 88   SpO2: 95%   Weight: 91.6 kg (202 lb)  "  Height: 154.9 cm (61\")     Body mass index is 38.17 kg/m².    Physical Exam  Vitals reviewed.   Constitutional:       Comments: Obese   HENT:      Head: Normocephalic.      Nose: Nose normal.      Mouth/Throat:      Dentition: Abnormal dentition.      Comments: Masked  Eyes:      Conjunctiva/sclera: Conjunctivae normal.   Neck:      Vascular: No JVD.      Comments: Cannot assess for JVD due to habitus  Cardiovascular:      Rate and Rhythm: Normal rate and regular rhythm.      Pulses: Decreased pulses.      Heart sounds: Normal heart sounds. No murmur heard.  Pulmonary:      Effort: Pulmonary effort is normal.      Breath sounds: Normal breath sounds.   Abdominal:      Palpations: Abdomen is soft.      Tenderness: There is no abdominal tenderness.      Comments: Obesity limits abdominal exam   Musculoskeletal:         General: No swelling. Normal range of motion.      Cervical back: Normal range of motion.   Skin:     General: Skin is warm and dry.      Findings: No rash.   Neurological:      General: No focal deficit present.      Mental Status: She is alert and oriented to person, place, and time.      Cranial Nerves: No cranial nerve deficit.   Psychiatric:         Mood and Affect: Mood normal.         Behavior: Behavior normal.         Thought Content: Thought content normal.           ECG 12 Lead    Date/Time: 10/26/2022 11:35 AM  Performed by: Demond Owen MD  Authorized by: Demond Owen MD   Comparison: compared with previous ECG   Similar to previous ECG  Rhythm: sinus rhythm  Conduction: conduction normal  ST Segments: ST segments normal  T inversion: all  QRS axis: normal  Other: no other findings    Clinical impression: abnormal EKG            Assessment:       Diagnosis Plan   1. Coronary artery disease of native artery of native heart with stable angina pectoris (HCC)        2. Mixed hyperlipidemia        3. Essential hypertension        4. PAD (peripheral artery disease) (HCC)          Plan:     "   1/2.  Coronary Artery Disease  Assessment  • The patient has CCS class I - angina only during strenuous or prolonged physical activity    Subjective - Objective  • There is a history of past MI 2012  • There is a history of previous coronary artery bypass graft 2012  • There has been a previous stent procedure using BMS 2012  • Current antiplatelet therapy includes aspirin 81 mg and clopidogrel 75 mg    She's on clopidogrel and aspirin due to to PAD, and is on atorvastatin.  She's on metoprolol and ISMN. All of her grafts are occluded to her RCA/LCx as are the native vessels; she is living off a LIMA that is supplied by an occluded left subclavian (so she's stealing from her vertebrals), but her native LAD is patent as well.   She does not tolerate ranolazine.     3.  She has had 2 episodes where her heart feels like it is beating hard, but its not fast or irregular.  This is associated with increased blood pressure.  She took one dose of furosemide and had excellent urine output and does endorse some dietary indiscretion recently.  I wonder if these are episodes of high blood pressure due to volume overload.  I asked her to take furosemide for 2 days straight and see if her symptoms improve.  If they do not, I asked her to call and we can increase her beta-blocker dose.    4.  She has complex vascular disease which is followed by Dr. Landin. She's on DAPT.      Sincerely,       Demond Owen MD

## 2023-05-09 RX ORDER — HYDROCHLOROTHIAZIDE 25 MG/1
TABLET ORAL
Qty: 90 TABLET | Refills: 1 | Status: SHIPPED | OUTPATIENT
Start: 2023-05-09

## 2023-10-18 RX ORDER — HYDROCHLOROTHIAZIDE 25 MG/1
TABLET ORAL
Qty: 90 TABLET | Refills: 0 | Status: SHIPPED | OUTPATIENT
Start: 2023-10-18

## 2023-11-01 ENCOUNTER — OFFICE VISIT (OUTPATIENT)
Dept: CARDIOLOGY | Facility: CLINIC | Age: 63
End: 2023-11-01
Payer: COMMERCIAL

## 2023-11-01 VITALS
DIASTOLIC BLOOD PRESSURE: 78 MMHG | HEART RATE: 84 BPM | WEIGHT: 202 LBS | HEIGHT: 61 IN | BODY MASS INDEX: 38.14 KG/M2 | SYSTOLIC BLOOD PRESSURE: 148 MMHG

## 2023-11-01 DIAGNOSIS — I10 ESSENTIAL HYPERTENSION: ICD-10-CM

## 2023-11-01 DIAGNOSIS — J43.9 PULMONARY EMPHYSEMA, UNSPECIFIED EMPHYSEMA TYPE: ICD-10-CM

## 2023-11-01 DIAGNOSIS — I73.9 PAD (PERIPHERAL ARTERY DISEASE): ICD-10-CM

## 2023-11-01 DIAGNOSIS — R06.09 DYSPNEA ON EXERTION: ICD-10-CM

## 2023-11-01 DIAGNOSIS — R00.2 POUNDING HEARTBEAT: ICD-10-CM

## 2023-11-01 DIAGNOSIS — I25.118 CORONARY ARTERY DISEASE OF NATIVE ARTERY OF NATIVE HEART WITH STABLE ANGINA PECTORIS: Primary | ICD-10-CM

## 2023-11-01 DIAGNOSIS — E78.2 MIXED HYPERLIPIDEMIA: ICD-10-CM

## 2023-11-01 DIAGNOSIS — E66.9 OBESITY (BMI 30-39.9): ICD-10-CM

## 2023-11-01 PROBLEM — E66.01 MORBIDLY OBESE: Status: RESOLVED | Noted: 2019-05-23 | Resolved: 2023-11-01

## 2023-11-01 RX ORDER — HYDROCHLOROTHIAZIDE 25 MG/1
25 TABLET ORAL DAILY
Qty: 90 TABLET | Refills: 3 | Status: SHIPPED | OUTPATIENT
Start: 2023-11-01

## 2023-11-01 RX ORDER — METOPROLOL TARTRATE 50 MG/1
50 TABLET, FILM COATED ORAL 2 TIMES DAILY
Qty: 180 TABLET | Refills: 0 | Status: SHIPPED | OUTPATIENT
Start: 2023-11-01

## 2023-11-01 NOTE — PROGRESS NOTES
"  Date of Office Visit: 2023  Encounter Provider: CLIFFORD Keys  Place of Service: [facility] CARDIOLOGY  Patient Name: Shaneka Guido  :1960  Primary Cardiologist: Dr. Demond Owen     Chief Complaint   Patient presents with    Coronary Artery Disease    Annual Exam   :     HPI: Shaneka Guido is a 63 y.o. female who presents today for annual cardiac follow-up visit.  I reviewed her medical records.    She has been diagnosed with hyperlipidemia and peripheral arterial disease.  All of her siblings and her mother have had severe CAD and PAD.    At age 35, she underwent aortobifemoral bypass for PAD.  At age 45, she woke up with an acute ischemic right leg and underwent surgical revascularization.  She has known left subclavian stenosis and carotid artery disease.  She follows with Dr. Edwin Landin.    In , she suffered an acute anterior MI and underwent bare-metal stent placement to the LAD.  The following day she underwent CABG.  Her anginal symptom was a sensation of \"ice\" in her chest.    In 2019, she was having chest pain.  Cardiac PET scan and echocardiogram were both abnormal.  Cardiac catheterization showed vein grafts occluded-OM1, OM 2, circumflex, RCA, and proximal RCA occluded.  She was started on ranolazine, but this was discontinued due to lightheadedness and fatigue.    She presents today for annual cardiac follow-up visit.  She reports exertional symptoms of dyspnea, heart pounding, and fatigue that resolves within 5 to 10 minutes of rest.  When she takes out the garbage it is the worst and has noticed that her blood pressures been 189/101 with heart rate of 120 when this occurs.  She denies chest pain, edema, dizziness, or syncope.  Blood pressure elevated today.    She also needs to undergo trigger repair surgery next week by Dr. Solo Parra and perioperative cardiac risk assessment has been requested.      Past Medical History: "   Diagnosis Date    Anxiety disorder     CAD (coronary artery disease)     anterior MI 2012, BMS to LAD, then CABG x 4 (LIMA-LAD, Y SVG-OM1-OM2, SVG-rPDA)    Chronic bronchitis     Chronic constipation     Chronic diastolic (congestive) heart failure     COPD (chronic obstructive pulmonary disease)     Eczema of face     Esophageal stricture     Essential hypertension     Generalized headaches     GERD (gastroesophageal reflux disease)     Hemorrhoids     History of tobacco use     Hyperlipidemia     Incontinence     Leg edema, left     On anticoagulant therapy     Osteoarthritis     PAD (peripheral artery disease)     Panic attacks     PTSD (post-traumatic stress disorder)     per patient     Subclavian artery stenosis, left     BP should not be checked in that arm       Past Surgical History:   Procedure Laterality Date    ANGIOPLASTY      BRONCHOSCOPY      CARDIAC CATHETERIZATION N/A 2019    Procedure: Coronary angiography;  Surgeon: Nickie Zhu MD;  Location:  [facility];  Service: Cardiovascular    CARDIAC CATHETERIZATION N/A 2019    Procedure: Left heart cath;  Surgeon: Nickie Zhu MD;  Location:  [facility];  Service: Cardiovascular    CARDIAC CATHETERIZATION N/A 2019    Procedure: Left ventriculography;  Surgeon: Nickie Zhu MD;  Location:  [facility];  Service: Cardiovascular    CARDIAC CATHETERIZATION N/A 2019    Procedure: Bypass graft study;  Surgeon: Nickie Zhu MD;  Location:  [facility];  Service: Cardiovascular    CHOLECYSTECTOMY      CORONARY ARTERY BYPASS GRAFT      HYSTERECTOMY      LAPAROSCOPIC TUBAL LIGATION         Social History     Socioeconomic History    Marital status: Single   Tobacco Use    Smoking status: Former     Years: 45     Types: Cigarettes     Quit date: 2018     Years since quittin.3    Smokeless tobacco: Never   Vaping Use    Vaping Use: Never used   Substance and  Sexual Activity    Alcohol use: No     Comment: CAFFEINE USE: SOFT DRINKS OCCASIONALLY.     Drug use: No    Sexual activity: Defer       Family History   Problem Relation Age of Onset    Chronic bronchitis Mother     Heart disease Mother     Hypertension Mother     Osteoporosis Mother     Cancer Father     Heart disease Father     Diabetes Father     Hypertension Sister     Osteoporosis Sister     Asthma Daughter     Hypertension Daughter     Osteoporosis Daughter     Hypertension Son     Chronic bronchitis Son     Malig Hyperthermia Neg Hx        The following portion of the patient's history were reviewed and updated as appropriate: past medical history, past surgical history, past social history, past family history, allergies, current medications, and problem list.    Review of Systems   Constitutional: Positive for malaise/fatigue.   Cardiovascular:  Positive for dyspnea on exertion and palpitations.   Respiratory: Negative.     Hematologic/Lymphatic: Negative.    Neurological: Negative.        Allergies   Allergen Reactions    Albuterol Other (See Comments)     Mouth blisters, throat swells    Amlodipine Swelling    Latex Other (See Comments)     Severe blistering, throat swells    Penicillins Other (See Comments)     Throat swells    Prednisone Other (See Comments)     Fluid retention.     Influenza Vaccines Rash         Current Outpatient Medications:     aspirin 81 MG EC tablet, Take 1 tablet by mouth Daily., Disp: , Rfl:     atorvastatin (LIPITOR) 20 MG tablet, Take 1 tablet by mouth Daily., Disp: , Rfl:     clopidogrel (PLAVIX) 75 MG tablet, Take 1 tablet by mouth Daily., Disp: , Rfl: 2    furosemide (LASIX) 40 MG tablet, Take 1 tablet by mouth As Needed., Disp: , Rfl:     hydroCHLOROthiazide (HYDRODIURIL) 25 MG tablet, TAKE 1 TABLET BY MOUTH EVERY DAY, Disp: 90 tablet, Rfl: 0    isosorbide mononitrate (IMDUR) 60 MG 24 hr tablet, Take 1 tablet by mouth 2 (Two) Times a Day., Disp: , Rfl:     levalbuterol  "(XOPENEX HFA) 45 MCG/ACT inhaler, Inhale 1-2 puffs Every 4 (Four) Hours As Needed for Wheezing., Disp: , Rfl:     levalbuterol (XOPENEX) 1.25 MG/3ML nebulizer solution, Take 1 ampule by nebulization Every 4 (Four) Hours As Needed for Wheezing., Disp: , Rfl:     metoprolol tartrate (LOPRESSOR) 25 MG tablet, Take 1 tablet by mouth 2 (Two) Times a Day., Disp: 180 tablet, Rfl: 0    pantoprazole (PROTONIX) 40 MG EC tablet, Take 1 tablet by mouth Daily., Disp: , Rfl:     tiotropium bromide-olodaterol (STIOLTO RESPIMAT) 2.5-2.5 MCG/ACT aerosol solution inhaler, Inhale 2 puffs Daily., Disp: , Rfl:          Objective:     Vitals:    11/01/23 1033   BP: 148/78   BP Location: Right arm   Patient Position: Sitting   Cuff Size: Adult   Pulse: 84   Weight: 91.6 kg (202 lb)   Height: 154.9 cm (60.98\")     Body mass index is 38.19 kg/m².    PHYSICAL EXAM:    Vitals Reviewed.   General Appearance: No acute distress, well developed and well nourished. Obese.   Eyes: Glasses.   HENT: No hearing loss noted.    Respiratory: No signs of respiratory distress. Respiration rhythm and depth normal.  Clear to auscultation.   Cardiovascular:  Jugular Venous Pressure: Normal  Heart Rate and Rhythm: Normal, Heart Sounds: Normal S1 and S2. No S3 or S4 noted.  Murmurs: No murmurs noted. No rubs, thrills, or gallops.   Lower Extremities: No edema noted.  Musculoskeletal: Normal movement of extremities.  Skin: General appearance normal.    Psychiatric: Patient alert and oriented to person, place, and time. Speech and behavior appropriate. Normal mood and affect.       ECG 12 Lead    Date/Time: 11/1/2023 10:26 AM  Performed by: Daria Henderson APRN    Authorized by: Daria Henderson APRN  Comparison: compared with previous ECG from 10/26/2022  Similar to previous ECG  Rhythm: sinus rhythm  Rate: normal  BPM: 84  Conduction: conduction normal  ST Segments: ST segments normal  T inversion: V3, V4, V5 and V6  QRS axis: normal  Other findings: T wave " abnormality    Clinical impression: normal ECG            Assessment:       Diagnosis Plan   1. Coronary artery disease of native artery of native heart with stable angina pectoris        2. Dyspnea on exertion        3. Pounding heartbeat        4. Essential hypertension        5. Mixed hyperlipidemia        6. PAD (peripheral artery disease)        7. Pulmonary emphysema, unspecified emphysema type        8. Obesity (BMI 30-39.9)               Plan:       1.  Coronary Artery Disease: Status post bare-metal stent placement to the LAD in 2012.  Status post CABG 2012.  In 2019, she had repeat heart cath which showed all grafts occluded to RCA/left circumflex.  She is living off the LIMA that is supplied by an occluded left subclavian (so she is feeling firm her vertebrals).  She did not tolerate ranolazine and remains on isosorbide.  Continue clopidogrel and atorvastatin.    2/3.  Dyspnea and Heart Pounding: This may be from her COPD, CAD, or combination of both.  I discussed with Dr. Owen.  We recommended a trial of metoprolol 50 mg twice per day.  I will call her in 1 week for reassessment.    4.  Hypertension: Blood pressure elevated today.    5.  Hyperlipidemia: Continue atorvastatin.    6.  Peripheral Arterial Disease: She has an occluded left subclavian, PAD of the lower extremities, and carotid artery disease all followed by Dr. Edwin Landin.    7.  COPD.    8.  Obesity: Body mass index is 38.19 kg/m².     9.  Perioperative Cardiac Risk Assessment: She has been recommended to undergo trigger thumb surgery next Friday by Dr. Solo Prara.  I have requested his last office note.  Further recommendations to follow after I follow-up with her via phone next week.    10.  If she has any worsening symptoms, she would need to present to the ED immediately.  I will call her in 1 week.    As always, it has been a pleasure to participate in your patient's care. Thank you.         Sincerely,         Daria Henderson  CLIFFORD  [facility]      Dictated utilizing Dragon Dictation  I spent 41 minutes reviewing her medical records/testing/previous office notes/labs, face-to-face interaction with patient, physical examination, formulating the plan of care, and discussion of plan of care with patient.

## 2023-11-08 ENCOUNTER — TELEPHONE (OUTPATIENT)
Dept: CARDIOLOGY | Facility: CLINIC | Age: 63
End: 2023-11-08
Payer: COMMERCIAL

## 2023-11-08 NOTE — TELEPHONE ENCOUNTER
Pt is scheduled for right thumb trigger release with mac NITIN.  With Dr. Solo Tate.  They are requesting pre op risk assessment and med management of Clopidogrel.       I see that you wanted the office note prior to your determination.  I have called to request.

## 2023-11-08 NOTE — TELEPHONE ENCOUNTER
Office scheduling-please arrange a 6-month follow-up appointment with Dr. Demond Owen.  Thank you

## 2023-11-08 NOTE — TELEPHONE ENCOUNTER
I recently saw her in the office.  She reported dyspnea and heart pounding.  I recommend increasing metoprolol to 50 mg twice per day.    I just spoke with her via phone.  She is doing very well on the metoprolol 50 mg twice per day and her symptoms have resolved.  Her blood pressure and heart rates been great.    I received a fax from Dr. Parra and reviewed his office note.  I called the office to verify and she will be undergoing MAC anesthesia for the trigger surgery repair.    Patient is considered at acceptable risk for surgery from a cardiovascular standpoint. According to the Jesus's Revised Cardiac Risk Index, patient is considered low risk (0.9%) of adverse cardiovascular events occurring with moderate risk surgery.  He may hold her clopidogrel 5 days before the procedure.    She verbalizes understanding and wants to see Dr. Owen in 6 months.

## 2023-11-16 ENCOUNTER — TRANSCRIBE ORDERS (OUTPATIENT)
Dept: ADMINISTRATIVE | Facility: HOSPITAL | Age: 63
End: 2023-11-16
Payer: COMMERCIAL

## 2023-11-16 DIAGNOSIS — T82.858D ARTERIOVENOUS FISTULA STENOSIS, SUBSEQUENT ENCOUNTER: Primary | ICD-10-CM

## 2023-11-30 ENCOUNTER — HOSPITAL ENCOUNTER (OUTPATIENT)
Dept: CT IMAGING | Facility: HOSPITAL | Age: 63
Discharge: HOME | End: 2023-11-30
Admitting: SURGERY
Payer: COMMERCIAL

## 2023-11-30 DIAGNOSIS — T82.858D ARTERIOVENOUS FISTULA STENOSIS, SUBSEQUENT ENCOUNTER: ICD-10-CM

## 2023-11-30 PROCEDURE — 25510000001 IOPAMIDOL PER 1 ML: Performed by: SURGERY

## 2023-11-30 PROCEDURE — 75635 CT ANGIO ABDOMINAL ARTERIES: CPT

## 2023-11-30 RX ADMIN — IOPAMIDOL 100 ML: 755 INJECTION, SOLUTION INTRAVENOUS at 13:31

## 2023-12-08 ENCOUNTER — HOSPITAL ENCOUNTER (INPATIENT)
Facility: HOSPITAL | Age: 63
LOS: 6 days | Discharge: NURSING, REHAB OR LONG-TERM CARE | End: 2023-12-14
Attending: EMERGENCY MEDICINE | Admitting: INTERNAL MEDICINE
Payer: COMMERCIAL

## 2023-12-08 ENCOUNTER — APPOINTMENT (OUTPATIENT)
Dept: GENERAL RADIOLOGY | Facility: HOSPITAL | Age: 63
End: 2023-12-08
Payer: COMMERCIAL

## 2023-12-08 DIAGNOSIS — J44.1 COPD EXACERBATION: Primary | ICD-10-CM

## 2023-12-08 DIAGNOSIS — Z78.9 DECREASED ACTIVITIES OF DAILY LIVING (ADL): ICD-10-CM

## 2023-12-08 DIAGNOSIS — R26.2 DIFFICULTY WALKING: ICD-10-CM

## 2023-12-08 DIAGNOSIS — F41.1 GAD (GENERALIZED ANXIETY DISORDER): ICD-10-CM

## 2023-12-08 DIAGNOSIS — R09.02 HYPOXIA: ICD-10-CM

## 2023-12-08 LAB
ALBUMIN SERPL-MCNC: 4.1 G/DL (ref 3.5–5.2)
ALBUMIN/GLOB SERPL: 1 G/DL
ALP SERPL-CCNC: 93 U/L (ref 39–117)
ALT SERPL W P-5'-P-CCNC: 18 U/L (ref 1–33)
ANION GAP SERPL CALCULATED.3IONS-SCNC: 13.2 MMOL/L (ref 5–15)
AST SERPL-CCNC: 21 U/L (ref 1–32)
BASOPHILS # BLD AUTO: 0.04 10*3/MM3 (ref 0–0.2)
BASOPHILS NFR BLD AUTO: 0.6 % (ref 0–1.5)
BILIRUB SERPL-MCNC: 0.5 MG/DL (ref 0–1.2)
BUN SERPL-MCNC: 17 MG/DL (ref 8–23)
BUN/CREAT SERPL: 14.5 (ref 7–25)
CALCIUM SPEC-SCNC: 9.5 MG/DL (ref 8.6–10.5)
CHLORIDE SERPL-SCNC: 94 MMOL/L (ref 98–107)
CO2 SERPL-SCNC: 24.8 MMOL/L (ref 22–29)
CREAT SERPL-MCNC: 1.17 MG/DL (ref 0.57–1)
DEPRECATED RDW RBC AUTO: 42.7 FL (ref 37–54)
EGFRCR SERPLBLD CKD-EPI 2021: 52.9 ML/MIN/1.73
EOSINOPHIL # BLD AUTO: 0.08 10*3/MM3 (ref 0–0.4)
EOSINOPHIL NFR BLD AUTO: 1.2 % (ref 0.3–6.2)
ERYTHROCYTE [DISTWIDTH] IN BLOOD BY AUTOMATED COUNT: 14.4 % (ref 12.3–15.4)
FLUAV SUBTYP SPEC NAA+PROBE: NOT DETECTED
FLUBV RNA ISLT QL NAA+PROBE: NOT DETECTED
GLOBULIN UR ELPH-MCNC: 4.3 GM/DL
GLUCOSE SERPL-MCNC: 105 MG/DL (ref 65–99)
HCT VFR BLD AUTO: 44.1 % (ref 34–46.6)
HGB BLD-MCNC: 14.5 G/DL (ref 12–15.9)
HOLD SPECIMEN: NORMAL
HOLD SPECIMEN: NORMAL
IMM GRANULOCYTES # BLD AUTO: 0.02 10*3/MM3 (ref 0–0.05)
IMM GRANULOCYTES NFR BLD AUTO: 0.3 % (ref 0–0.5)
LYMPHOCYTES # BLD AUTO: 0.59 10*3/MM3 (ref 0.7–3.1)
LYMPHOCYTES NFR BLD AUTO: 9.2 % (ref 19.6–45.3)
MCH RBC QN AUTO: 27.1 PG (ref 26.6–33)
MCHC RBC AUTO-ENTMCNC: 32.9 G/DL (ref 31.5–35.7)
MCV RBC AUTO: 82.3 FL (ref 79–97)
MONOCYTES # BLD AUTO: 0.57 10*3/MM3 (ref 0.1–0.9)
MONOCYTES NFR BLD AUTO: 8.9 % (ref 5–12)
NEUTROPHILS NFR BLD AUTO: 5.12 10*3/MM3 (ref 1.7–7)
NEUTROPHILS NFR BLD AUTO: 79.8 % (ref 42.7–76)
NRBC BLD AUTO-RTO: 0 /100 WBC (ref 0–0.2)
NT-PROBNP SERPL-MCNC: 361.2 PG/ML (ref 0–900)
PLATELET # BLD AUTO: 292 10*3/MM3 (ref 140–450)
PMV BLD AUTO: 9.2 FL (ref 6–12)
POTASSIUM SERPL-SCNC: 3.3 MMOL/L (ref 3.5–5.2)
PROCALCITONIN SERPL-MCNC: 0.11 NG/ML (ref 0–0.25)
PROT SERPL-MCNC: 8.4 G/DL (ref 6–8.5)
RBC # BLD AUTO: 5.36 10*6/MM3 (ref 3.77–5.28)
RSV RNA NPH QL NAA+NON-PROBE: DETECTED
SARS-COV-2 RNA RESP QL NAA+PROBE: NOT DETECTED
SODIUM SERPL-SCNC: 132 MMOL/L (ref 136–145)
TROPONIN T SERPL HS-MCNC: 12 NG/L
WBC NRBC COR # BLD AUTO: 6.42 10*3/MM3 (ref 3.4–10.8)
WHOLE BLOOD HOLD COAG: NORMAL
WHOLE BLOOD HOLD SPECIMEN: NORMAL

## 2023-12-08 PROCEDURE — 25010000002 ENOXAPARIN PER 10 MG: Performed by: INTERNAL MEDICINE

## 2023-12-08 PROCEDURE — 80053 COMPREHEN METABOLIC PANEL: CPT | Performed by: EMERGENCY MEDICINE

## 2023-12-08 PROCEDURE — 94799 UNLISTED PULMONARY SVC/PX: CPT

## 2023-12-08 PROCEDURE — 87449 NOS EACH ORGANISM AG IA: CPT | Performed by: INTERNAL MEDICINE

## 2023-12-08 PROCEDURE — 25010000002 METHYLPREDNISOLONE PER 125 MG: Performed by: EMERGENCY MEDICINE

## 2023-12-08 PROCEDURE — 84484 ASSAY OF TROPONIN QUANT: CPT | Performed by: EMERGENCY MEDICINE

## 2023-12-08 PROCEDURE — 71045 X-RAY EXAM CHEST 1 VIEW: CPT

## 2023-12-08 PROCEDURE — 99223 1ST HOSP IP/OBS HIGH 75: CPT | Performed by: INTERNAL MEDICINE

## 2023-12-08 PROCEDURE — 93005 ELECTROCARDIOGRAM TRACING: CPT | Performed by: EMERGENCY MEDICINE

## 2023-12-08 PROCEDURE — 83880 ASSAY OF NATRIURETIC PEPTIDE: CPT | Performed by: EMERGENCY MEDICINE

## 2023-12-08 PROCEDURE — 84145 PROCALCITONIN (PCT): CPT | Performed by: INTERNAL MEDICINE

## 2023-12-08 PROCEDURE — 36415 COLL VENOUS BLD VENIPUNCTURE: CPT

## 2023-12-08 PROCEDURE — 94640 AIRWAY INHALATION TREATMENT: CPT

## 2023-12-08 PROCEDURE — 93005 ELECTROCARDIOGRAM TRACING: CPT

## 2023-12-08 PROCEDURE — 85025 COMPLETE CBC W/AUTO DIFF WBC: CPT | Performed by: EMERGENCY MEDICINE

## 2023-12-08 PROCEDURE — 87637 SARSCOV2&INF A&B&RSV AMP PRB: CPT | Performed by: EMERGENCY MEDICINE

## 2023-12-08 PROCEDURE — 87899 AGENT NOS ASSAY W/OPTIC: CPT | Performed by: INTERNAL MEDICINE

## 2023-12-08 PROCEDURE — 99285 EMERGENCY DEPT VISIT HI MDM: CPT

## 2023-12-08 RX ORDER — LEVALBUTEROL INHALATION SOLUTION 1.25 MG/3ML
1.25 SOLUTION RESPIRATORY (INHALATION)
Status: COMPLETED | OUTPATIENT
Start: 2023-12-08 | End: 2023-12-08

## 2023-12-08 RX ORDER — LEVALBUTEROL INHALATION SOLUTION 1.25 MG/3ML
1.25 SOLUTION RESPIRATORY (INHALATION)
Status: DISPENSED | OUTPATIENT
Start: 2023-12-08 | End: 2023-12-08

## 2023-12-08 RX ORDER — CLOPIDOGREL BISULFATE 75 MG/1
75 TABLET ORAL DAILY
Status: DISCONTINUED | OUTPATIENT
Start: 2023-12-09 | End: 2023-12-14 | Stop reason: HOSPADM

## 2023-12-08 RX ORDER — ASPIRIN 81 MG/1
81 TABLET ORAL DAILY
Status: CANCELLED | OUTPATIENT
Start: 2023-12-08

## 2023-12-08 RX ORDER — SODIUM CHLORIDE 9 MG/ML
40 INJECTION, SOLUTION INTRAVENOUS AS NEEDED
Status: DISCONTINUED | OUTPATIENT
Start: 2023-12-08 | End: 2023-12-14 | Stop reason: HOSPADM

## 2023-12-08 RX ORDER — METOPROLOL TARTRATE 50 MG/1
50 TABLET, FILM COATED ORAL ONCE
Status: COMPLETED | OUTPATIENT
Start: 2023-12-08 | End: 2023-12-08

## 2023-12-08 RX ORDER — BUDESONIDE AND FORMOTEROL FUMARATE DIHYDRATE 160; 4.5 UG/1; UG/1
2 AEROSOL RESPIRATORY (INHALATION)
Status: DISCONTINUED | OUTPATIENT
Start: 2023-12-08 | End: 2023-12-09

## 2023-12-08 RX ORDER — METHYLPREDNISOLONE SODIUM SUCCINATE 125 MG/2ML
125 INJECTION, POWDER, LYOPHILIZED, FOR SOLUTION INTRAMUSCULAR; INTRAVENOUS ONCE
Status: COMPLETED | OUTPATIENT
Start: 2023-12-08 | End: 2023-12-08

## 2023-12-08 RX ORDER — ATORVASTATIN CALCIUM 40 MG/1
20 TABLET, FILM COATED ORAL ONCE
Status: COMPLETED | OUTPATIENT
Start: 2023-12-08 | End: 2023-12-08

## 2023-12-08 RX ORDER — LEVALBUTEROL INHALATION SOLUTION 1.25 MG/3ML
1.25 SOLUTION RESPIRATORY (INHALATION) EVERY 4 HOURS
Status: DISCONTINUED | OUTPATIENT
Start: 2023-12-08 | End: 2023-12-11

## 2023-12-08 RX ORDER — SODIUM CHLORIDE 0.9 % (FLUSH) 0.9 %
10 SYRINGE (ML) INJECTION AS NEEDED
Status: DISCONTINUED | OUTPATIENT
Start: 2023-12-08 | End: 2023-12-14 | Stop reason: HOSPADM

## 2023-12-08 RX ORDER — ATORVASTATIN CALCIUM 20 MG/1
20 TABLET, FILM COATED ORAL NIGHTLY
Status: DISCONTINUED | OUTPATIENT
Start: 2023-12-08 | End: 2023-12-14 | Stop reason: HOSPADM

## 2023-12-08 RX ORDER — ALBUTEROL SULFATE 2.5 MG/3ML
1.25 SOLUTION RESPIRATORY (INHALATION)
Status: DISCONTINUED | OUTPATIENT
Start: 2023-12-08 | End: 2023-12-08 | Stop reason: ALTCHOICE

## 2023-12-08 RX ORDER — ISOSORBIDE MONONITRATE 60 MG/1
60 TABLET, EXTENDED RELEASE ORAL ONCE
Status: COMPLETED | OUTPATIENT
Start: 2023-12-08 | End: 2023-12-08

## 2023-12-08 RX ORDER — FUROSEMIDE 20 MG/1
20 TABLET ORAL DAILY
Status: DISCONTINUED | OUTPATIENT
Start: 2023-12-09 | End: 2023-12-09

## 2023-12-08 RX ORDER — SODIUM CHLORIDE 0.9 % (FLUSH) 0.9 %
10 SYRINGE (ML) INJECTION EVERY 12 HOURS SCHEDULED
Status: DISCONTINUED | OUTPATIENT
Start: 2023-12-08 | End: 2023-12-14 | Stop reason: HOSPADM

## 2023-12-08 RX ORDER — FUROSEMIDE 20 MG/1
20 TABLET ORAL DAILY PRN
COMMUNITY
Start: 2023-11-15

## 2023-12-08 RX ORDER — ASPIRIN 81 MG/1
81 TABLET ORAL ONCE
Status: COMPLETED | OUTPATIENT
Start: 2023-12-08 | End: 2023-12-08

## 2023-12-08 RX ORDER — ACETAMINOPHEN 325 MG/1
650 TABLET ORAL EVERY 4 HOURS PRN
Status: DISCONTINUED | OUTPATIENT
Start: 2023-12-08 | End: 2023-12-14 | Stop reason: HOSPADM

## 2023-12-08 RX ORDER — PREDNISONE 10 MG/1
40 TABLET ORAL
Status: DISCONTINUED | OUTPATIENT
Start: 2023-12-09 | End: 2023-12-10

## 2023-12-08 RX ORDER — ENOXAPARIN SODIUM 100 MG/ML
40 INJECTION SUBCUTANEOUS DAILY
Status: DISCONTINUED | OUTPATIENT
Start: 2023-12-08 | End: 2023-12-14 | Stop reason: HOSPADM

## 2023-12-08 RX ADMIN — METHYLPREDNISOLONE SODIUM SUCCINATE 125 MG: 125 INJECTION INTRAMUSCULAR; INTRAVENOUS at 18:27

## 2023-12-08 RX ADMIN — LEVALBUTEROL HYDROCHLORIDE 1.25 MG: 1.25 SOLUTION RESPIRATORY (INHALATION) at 19:02

## 2023-12-08 RX ADMIN — ACETAMINOPHEN 650 MG: 325 TABLET ORAL at 21:32

## 2023-12-08 RX ADMIN — Medication 10 ML: at 21:33

## 2023-12-08 RX ADMIN — METOPROLOL TARTRATE 50 MG: 50 TABLET, FILM COATED ORAL at 19:53

## 2023-12-08 RX ADMIN — ATORVASTATIN CALCIUM 20 MG: 40 TABLET, FILM COATED ORAL at 19:53

## 2023-12-08 RX ADMIN — ISOSORBIDE MONONITRATE 60 MG: 60 TABLET, EXTENDED RELEASE ORAL at 19:53

## 2023-12-08 RX ADMIN — LEVALBUTEROL HYDROCHLORIDE 1.25 MG: 1.25 SOLUTION RESPIRATORY (INHALATION) at 19:07

## 2023-12-08 RX ADMIN — LEVALBUTEROL HYDROCHLORIDE 1.25 MG: 1.25 SOLUTION RESPIRATORY (INHALATION) at 23:07

## 2023-12-08 RX ADMIN — ASPIRIN 81 MG: 81 TABLET, COATED ORAL at 19:53

## 2023-12-08 RX ADMIN — LEVALBUTEROL HYDROCHLORIDE 1.25 MG: 1.25 SOLUTION RESPIRATORY (INHALATION) at 18:55

## 2023-12-08 RX ADMIN — ENOXAPARIN SODIUM 40 MG: 100 INJECTION SUBCUTANEOUS at 21:33

## 2023-12-08 NOTE — ED PROVIDER NOTES
"Time: 4:54 PM EST  Date of encounter:  12/8/2023  Independent Historian/Clinical History and Information was obtained by:   Patient    History is limited by: N/A    Chief Complaint: Cough and shortness of breath      History of Present Illness:  Patient is a 63 y.o. year old female who presents to the emergency department for evaluation of cough and shortness of breath.  This patient states that over the last week she has been having increasing shortness of breath and cough.  She has had no fever but she is coughing up some whitish \"salty\" material.  She has had no vomiting or diarrhea or abdominal pain.  She currently denies any chest pain the patient has a history of COPD and coronary disease.  Her pulse oximeter was approximately 88 to 89% on room air in the emergency department and she was wheezing significantly.    HPI    Patient Care Team  Primary Care Provider: Andrea Salazar MD    Past Medical History:     Allergies   Allergen Reactions    Albuterol Other (See Comments)     Mouth blisters, throat swells    Amlodipine Swelling    Latex Other (See Comments)     Severe blistering, throat swells    Penicillins Other (See Comments)     Throat swells    Prednisone Other (See Comments)     Fluid retention.     Influenza Vaccines Rash     Past Medical History:   Diagnosis Date    Anxiety disorder     Asthma     CAD (coronary artery disease)     anterior MI 2012, BMS to LAD, then CABG x 4 (LIMA-LAD, Y SVG-OM1-OM2, SVG-rPDA)    Chronic bronchitis     Chronic constipation     Chronic diastolic (congestive) heart failure     COPD (chronic obstructive pulmonary disease)     Eczema of face     Esophageal stricture     Essential hypertension     Generalized headaches     GERD (gastroesophageal reflux disease)     Hemorrhoids     History of tobacco use     Hyperlipidemia     Incontinence     Leg edema, left     On anticoagulant therapy     Osteoarthritis     PAD (peripheral artery disease)     Panic attacks     PTSD " (post-traumatic stress disorder)     per patient     Subclavian artery stenosis, left     BP should not be checked in that arm     Past Surgical History:   Procedure Laterality Date    ANGIOPLASTY      BRONCHOSCOPY      CARDIAC CATHETERIZATION N/A 5/8/2019    Procedure: Coronary angiography;  Surgeon: Nickie Zhu MD;  Location:  [facility];  Service: Cardiovascular    CARDIAC CATHETERIZATION N/A 5/8/2019    Procedure: Left heart cath;  Surgeon: Nickie Zhu MD;  Location:  [facility];  Service: Cardiovascular    CARDIAC CATHETERIZATION N/A 5/8/2019    Procedure: Left ventriculography;  Surgeon: Nickie Zhu MD;  Location:  [facility];  Service: Cardiovascular    CARDIAC CATHETERIZATION N/A 5/8/2019    Procedure: Bypass graft study;  Surgeon: Nickie Zhu MD;  Location:  [facility];  Service: Cardiovascular    CHOLECYSTECTOMY      CORONARY ARTERY BYPASS GRAFT  2012    HYSTERECTOMY      LAPAROSCOPIC TUBAL LIGATION       Family History   Problem Relation Age of Onset    Chronic bronchitis Mother     Heart disease Mother     Hypertension Mother     Osteoporosis Mother     Cancer Father     Heart disease Father     Diabetes Father     Hypertension Sister     Osteoporosis Sister     Asthma Daughter     Hypertension Daughter     Osteoporosis Daughter     Hypertension Son     Chronic bronchitis Son     Malig Hyperthermia Neg Hx        Home Medications:  Prior to Admission medications    Medication Sig Start Date End Date Taking? Authorizing Provider   aspirin 81 MG EC tablet Take 1 tablet by mouth Daily.    ProviderJenifer MD   atorvastatin (LIPITOR) 20 MG tablet Take 1 tablet by mouth Daily.    Jenifer Barksdale MD   clopidogrel (PLAVIX) 75 MG tablet Take 1 tablet by mouth Daily. 4/1/19   Jenifer Barksdale MD   furosemide (LASIX) 40 MG tablet Take 1 tablet by mouth As Needed.    Jenifer Barksdale MD   hydroCHLOROthiazide  (HYDRODIURIL) 25 MG tablet Take 1 tablet by mouth Daily. 23   Daria Henderson APRN   isosorbide mononitrate (IMDUR) 60 MG 24 hr tablet Take 1 tablet by mouth 2 (Two) Times a Day.    Jenifer Barksdale MD   levalbuterol (XOPENEX HFA) 45 MCG/ACT inhaler Inhale 1-2 puffs Every 4 (Four) Hours As Needed for Wheezing.    Jenifer Barksdale MD   levalbuterol (XOPENEX) 1.25 MG/3ML nebulizer solution Take 1 ampule by nebulization Every 4 (Four) Hours As Needed for Wheezing.    Jenifer Barksdale MD   metoprolol tartrate (LOPRESSOR) 50 MG tablet Take 1 tablet by mouth 2 (Two) Times a Day. 23   Daria Henderson APRN   pantoprazole (PROTONIX) 40 MG EC tablet Take 1 tablet by mouth Daily.    Jenifer Barksdale MD   tiotropium bromide-olodaterol (STIOLTO RESPIMAT) 2.5-2.5 MCG/ACT aerosol solution inhaler Inhale 2 puffs Daily.    Jenifer Barksdale MD        Social History:   Social History     Tobacco Use    Smoking status: Former     Years: 45     Types: Cigarettes     Quit date: 2018     Years since quittin.4    Smokeless tobacco: Never   Vaping Use    Vaping Use: Never used   Substance Use Topics    Alcohol use: No     Comment: CAFFEINE USE: SOFT DRINKS OCCASIONALLY.     Drug use: No         Review of Systems:  Review of Systems   Constitutional:  Negative for chills and fever.   HENT:  Negative for congestion, ear pain and sore throat.    Eyes:  Negative for pain.   Respiratory:  Positive for cough, shortness of breath and wheezing. Negative for chest tightness.    Cardiovascular:  Negative for chest pain.   Gastrointestinal:  Negative for abdominal pain, diarrhea, nausea and vomiting.   Genitourinary:  Negative for flank pain and hematuria.   Musculoskeletal:  Negative for joint swelling.   Skin:  Negative for pallor.   Neurological:  Negative for seizures and headaches.   All other systems reviewed and are negative.       Physical Exam:  /74 (BP Location: Left arm, Patient  "Position: Lying)   Pulse 99   Temp 97.7 °F (36.5 °C) (Oral)   Resp 22   Ht 154.9 cm (61\")   Wt 90.5 kg (199 lb 8.3 oz)   SpO2 90%   BMI 37.70 kg/m²     Physical Exam  Vitals and nursing note reviewed.   Constitutional:       General: She is in acute distress.      Appearance: Normal appearance. She is not toxic-appearing.   HENT:      Head: Normocephalic and atraumatic.      Mouth/Throat:      Mouth: Mucous membranes are moist.   Eyes:      General: No scleral icterus.  Cardiovascular:      Rate and Rhythm: Normal rate and regular rhythm.      Pulses: Normal pulses.      Heart sounds: Normal heart sounds.   Pulmonary:      Effort: Accessory muscle usage and respiratory distress present.      Breath sounds: Examination of the right-middle field reveals wheezing and rhonchi. Examination of the left-middle field reveals wheezing and rhonchi. Wheezing and rhonchi present.   Abdominal:      General: Abdomen is flat.      Palpations: Abdomen is soft.      Tenderness: There is no abdominal tenderness.   Musculoskeletal:         General: Normal range of motion.      Cervical back: Normal range of motion and neck supple.   Skin:     General: Skin is warm and dry.   Neurological:      Mental Status: She is alert and oriented to person, place, and time. Mental status is at baseline.                  Procedures:  Procedures      Medical Decision Making:      Comorbidities that affect care:    COPD    External Notes reviewed:    Previous Admission Note: for CABG      The following orders were placed and all results were independently analyzed by me:  Orders Placed This Encounter   Procedures    COVID-19, FLU A/B, RSV PCR 1 HR TAT - Swab, Nasopharynx    S. Pneumo Ag Urine or CSF - Urine, Urine, Clean Catch    Legionella Antigen, Urine - Urine, Urine, Clean Catch    Respiratory Culture - Sputum, Cough    XR Chest 1 View    [place] Draw    Comprehensive Metabolic Panel    BNP    Single High Sensitivity Troponin T    CBC Auto " Differential    Procalcitonin    CBC Auto Differential    Comprehensive Metabolic Panel    Magnesium    Procalcitonin    Renal Function Panel    Diet: Cardiac Diets, Diabetic Diets; Low Sodium (2g); Consistent Carbohydrate; Texture: Regular Texture (IDDSI 7); Fluid Consistency: Thin (IDDSI 0)    Undress & Gown    Continuous Pulse Oximetry    Vital Signs    Vital Signs    Intake & Output    Weigh Patient    Oral Care    Saline Lock & Maintain IV Access    Daily Weights    Strict Intake & Output    Discontinue Cardiac Monitoring    Please humidify patient's nasal oxygen  Nursing Communication    Code Status and Medical Interventions:    Hospitalist (on-call MD unless specified)    OT Consult: Eval & Treat    PT Consult: Eval & Treat Functional Mobility Below Baseline    Oxygen Therapy- Nasal Cannula; Titrate 1-6 LPM Per SpO2; 90 - 95%    Oscillating Positive Expiratory Pressure (OPEP)    ECG 12 Lead ED Triage Standing Order; SOA    Insert Peripheral IV    Insert Peripheral IV    Inpatient Admission    CBC & Differential    Green Top (Gel)    Lavender Top    Gold Top - SST    Light Blue Top    CBC & Differential       Medications Given in the Emergency Department:  Medications   sodium chloride 0.9 % flush 10 mL (has no administration in time range)   levalbuterol (XOPENEX) nebulizer solution 1.25 mg (1.25 mg Nebulization Not Given 12/8/23 1901)   sodium chloride 0.9 % flush 10 mL (10 mL Intravenous Given 12/9/23 1029)   sodium chloride 0.9 % flush 10 mL (has no administration in time range)   sodium chloride 0.9 % infusion 40 mL (has no administration in time range)   Enoxaparin Sodium (LOVENOX) syringe 40 mg (40 mg Subcutaneous Given 12/8/23 2133)   acetaminophen (TYLENOL) tablet 650 mg (650 mg Oral Given 12/9/23 1940)   predniSONE (DELTASONE) tablet 40 mg (40 mg Oral Given 12/9/23 1005)   levalbuterol (XOPENEX) nebulizer solution 1.25 mg (1.25 mg Nebulization Given 12/9/23 2006)   clopidogrel (PLAVIX) tablet 75 mg  (75 mg Oral Given 12/9/23 1006)   atorvastatin (LIPITOR) tablet 20 mg (20 mg Oral Not Given 12/8/23 2342)   !Patient Home Medications Stored in Pharmacy ( Does not apply Not Given 12/9/23 1118)   pantoprazole (PROTONIX) EC tablet 40 mg (40 mg Oral Given 12/9/23 1006)   revefenacin (YUPELRI) nebulizer solution 175 mcg (175 mcg Nebulization Given 12/9/23 1045)   metoprolol tartrate (LOPRESSOR) tablet 50 mg (50 mg Oral Given 12/9/23 1005)   isosorbide mononitrate (IMDUR) 24 hr tablet 60 mg (60 mg Oral Given 12/9/23 1138)   doxycycline (MONODOX) capsule 100 mg (100 mg Oral Given 12/9/23 1359)   fluticasone (FLONASE) 50 MCG/ACT nasal spray 2 spray (2 sprays Each Nare Not Given 12/9/23 1359)   aspirin EC tablet 81 mg (has no administration in time range)   methylPREDNISolone sodium succinate (SOLU-Medrol) injection 125 mg (125 mg Intravenous Given 12/8/23 1827)   aspirin EC tablet 81 mg (81 mg Oral Given 12/8/23 1953)   isosorbide mononitrate (IMDUR) 24 hr tablet 60 mg (60 mg Oral Given 12/8/23 1953)   metoprolol tartrate (LOPRESSOR) tablet 50 mg (50 mg Oral Given 12/8/23 1953)   atorvastatin (LIPITOR) tablet 20 mg (20 mg Oral Given 12/8/23 1953)   levalbuterol (XOPENEX) nebulizer solution 1.25 mg (1.25 mg Nebulization Given 12/8/23 1907)        ED Course:    ED Course as of 12/09/23 2110   Fri Dec 08, 2023   1655 Alkaline Phosphatase: 93 [PP]      ED Course User Index  [PP] Igor Coronel DO         EKG: sinus Rhythm with a rate of 86 BPM   No acute ischemic changes  Labs:    Lab Results (last 24 hours)       Procedure Component Value Units Date/Time    S. Pneumo Ag Urine or CSF - Urine, Urine, Clean Catch [[id number]]  (Normal) Collected: 12/08/23 2342    Specimen: Urine, Clean Catch Updated: 12/09/23 1123     Strep Pneumo Ag Negative    Legionella Antigen, Urine - Urine, Urine, Clean Catch [[id number]]  (Normal) Collected: 12/08/23 2342    Specimen: Urine, Clean Catch Updated: 12/09/23 1123     LEGIONELLA ANTIGEN,  URINE Negative    CBC Auto Differential [[id number]]  (Abnormal) Collected: 12/09/23 0624    Specimen: Blood from Arm, Right Updated: 12/09/23 0703     WBC 4.49 10*3/mm3      RBC 5.19 10*6/mm3      Hemoglobin 13.8 g/dL      Hematocrit 42.7 %      MCV 82.3 fL      MCH 26.6 pg      MCHC 32.3 g/dL      RDW 14.6 %      RDW-SD 43.5 fl      MPV 9.3 fL      Platelets 259 10*3/mm3      Neutrophil % 84.0 %      Lymphocyte % 11.4 %      Monocyte % 4.0 %      Eosinophil % 0.0 %      Basophil % 0.2 %      Immature Grans % 0.4 %      Neutrophils, Absolute 3.77 10*3/mm3      Lymphocytes, Absolute 0.51 10*3/mm3      Monocytes, Absolute 0.18 10*3/mm3      Eosinophils, Absolute 0.00 10*3/mm3      Basophils, Absolute 0.01 10*3/mm3      Immature Grans, Absolute 0.02 10*3/mm3      nRBC 0.0 /100 WBC     Comprehensive Metabolic Panel [[id number]]  (Abnormal) Collected: 12/09/23 0624    Specimen: Blood from Arm, Right Updated: 12/09/23 0732     Glucose 148 mg/dL      BUN 21 mg/dL      Creatinine 1.26 mg/dL      Sodium 133 mmol/L      Potassium 3.3 mmol/L      Chloride 95 mmol/L      CO2 22.9 mmol/L      Calcium 9.6 mg/dL      Total Protein 8.2 g/dL      Albumin 3.8 g/dL      ALT (SGPT) 19 U/L      AST (SGOT) 25 U/L      Alkaline Phosphatase 88 U/L      Total Bilirubin 0.4 mg/dL      Globulin 4.4 gm/dL      A/G Ratio 0.9 g/dL      BUN/Creatinine Ratio 16.7     Anion Gap 15.1 mmol/L      eGFR 48.4 mL/min/1.73     Narrative:      GFR Normal >60  Chronic Kidney Disease <60  Kidney Failure <15      Magnesium [[id number]]  (Normal) Collected: 12/09/23 0624    Specimen: Blood from Arm, Right Updated: 12/09/23 0732     Magnesium 2.3 mg/dL     Procalcitonin [[id number]]  (Normal) Collected: 12/09/23 0624    Specimen: Blood from Arm, Right Updated: 12/09/23 0800     Procalcitonin 0.14 ng/mL     Narrative:      As a Marker for Sepsis (Non-Neonates):    1. <0.5 ng/mL represents a low risk of severe sepsis and/or septic shock.  2. >2 ng/mL represents a  "high risk of severe sepsis and/or septic shock.    As a Marker for Lower Respiratory Tract Infections that require antibiotic therapy:    PCT on Admission    Antibiotic Therapy       6-12 Hrs later    >0.5                Strongly Recommended  >0.25 - <0.5        Recommended  0.1 - 0.25          Discouraged              Remeasure/reassess PCT  <0.1                Strongly Discouraged     Remeasure/reassess PCT    As 28 day mortality risk marker: \"Change in Procalcitonin Result\" (>80% or <=80%) if Day 0 (or Day 1) and Day 4 values are available. Refer to [email or web address]://[email or web address]-pct-calculator.com    Change in PCT <=80%  A decrease of PCT levels below or equal to 80% defines a positive change in PCT test result representing a higher risk for 28-day all-cause mortality of patients diagnosed with severe sepsis for septic shock.    Change in PCT >80%  A decrease of PCT levels of more than 80% defines a negative change in PCT result representing a lower risk for 28-day all-cause mortality of patients diagnosed with severe sepsis or septic shock.    This test is Prognostic not Diagnostic, if elevated correlate with clinical findings before administering antibiotic treatment.        Respiratory Culture - Sputum, Cough [[id number]] Collected: 12/09/23 1428    Specimen: Sputum from Cough Updated: 12/09/23 1837     Gram Stain Rare (1+) Epithelial cells per low power field      Moderate (3+) WBCs per low power field      Few (2+) Gram positive cocci in pairs and chains             Imaging:    No Radiology Exams Resulted Within Past 24 Hours      Differential Diagnosis and Discussion:    Dyspnea: Differential diagnosis includes but is not limited to metabolic acidosis, neurological disorders, psychogenic, asthma, pneumothorax, upper airway obstruction, COPD, pneumonia, noncardiogenic pulmonary edema, interstitial lung disease, anemia, congestive heart failure, and pulmonary embolism    All labs were reviewed and interpreted by me.  EKG " was interpreted by me.    MDM     Amount and/or Complexity of Data Reviewed  Clinical lab tests: reviewed  Tests in the radiology section of CPT®: reviewed  Tests in the medicine section of CPT®: reviewed                 Patient Care Considerations:    CT CHEST: I considered ordering a CT scan of the chest, however the patient has signs of COPD exacerbation and no signs of PE      Consultants/Shared Management Plan:    Hospitalist: I have discussed the case with hospitalist who agrees to accept the patient for admission.    Social Determinants of Health:    Patient is unable to carry out activities of daily life. Escalation of care is necessary.       Disposition and Care Coordination:    Admit:   Through independent evaluation of the patient's history, physical, and imperical data, the patient meets criteria for observation/admission to the hospital.        Final diagnoses:   COPD exacerbation   Hypoxia        ED Disposition       ED Disposition   Decision to Admit    Condition   --    Comment   Level of Care: Med/Surg [1]   Diagnosis: COPD exacerbation [025775]   Isolate for COVID?: Yes [1]   Certification: I Certify That Inpatient Hospital Services Are Medically Necessary For Greater Than 2 Midnights                 This medical record created using voice recognition software.             Igor Coronel, DO  12/09/23 4788

## 2023-12-08 NOTE — H&P
[facility] HISTORY AND PHYSICAL  Date: 2023   Patient Name: Shaneka Guido  : 1960  MRN: [id number]  Primary Care Physician:  Andrea Salazar MD  Date of admission: 2023    Subjective   Subjective     Chief Complaint: Shortness of breath    HPI:    Shaneka Guido is a 63 y.o. female past medical history of coronary disease status post CABG, peripheral vascular disease status post multiple revascularizations, obesity BMI of 35, heart failure with preserved ejection fraction EF of 51 to 55% and grade 2 diastolic dysfunction, and left clavian stenosis who presents with shortness of breath    Patient states that she has had fevers at home.  She says they have been about 100-1 01 but she does not know specifically because her thermometer battery broke so she is just guessing.  No chills.  She said this started on Wednesday.  As result of her symptoms came there for further evaluation.    In the emergency department the patient's vital signs are as follows: Temperature 97.8 and pulse is 91 respiratory is 20, blood pressure is 143/68, satting 89% on room air when sitting and desats below that when walking per ER physician.  CBC shows no concerning abnormalities.  CMP shows sodium 132, potassium 3.3, and creatinine of 1.17.  Chest x-ray shows no abnormalities.  Patient was given methylprednisolone and breathing treatments in the emergency department.  She will be admitted to hospital for acute hypoxic respiratory failure due to COPD exacerbation.    All systems reviewed abnormalities noted above    Personal History     Past Medical History:  Coronary artery disease  Hypertension  COPD  Peripheral vascular disease  Obesity with BMI greater than 35  Anxiety/depression  Dyslipidemia  Heart failure with preserved ejection fraction EF of 51 to 55% and grade 2 diastolic dysfunction seen on echo from 2019  Left subclavian stenosis      Past Surgical History:  Cardiac  catheterization  Cholecystectomy  Coronary bypass  Hysterectomy  Tubal ligation  Bronchoscopy  Aortobifemoral bypass    Family History:   Asthma, heart disease, osteoporosis    Social History:   Former smoker.  No alcohol.    Home Medications:  aspirin, atorvastatin, clopidogrel, furosemide, hydroCHLOROthiazide, isosorbide mononitrate, levalbuterol, metoprolol tartrate, pantoprazole, and tiotropium bromide-olodaterol    Allergies:  Allergies   Allergen Reactions   • Albuterol Other (See Comments)     Mouth blisters, throat swells   • Amlodipine Swelling   • Latex Other (See Comments)     Severe blistering, throat swells   • Penicillins Other (See Comments)     Throat swells   • Prednisone Other (See Comments)     Fluid retention.    • Influenza Vaccines Rash         Objective   Objective     Vitals:   Temp:  [97.8 °F (36.6 °C)] 97.8 °F (36.6 °C)  Heart Rate:  [91-92] 92  Resp:  [20] 20  BP: (138-143)/(68) 143/68    Physical Exam    Constitutional: Chronically ill-appearing.  Mild respiratory stress   Eyes: Pupils equal, sclerae anicteric, no conjunctival injection   HENT: NCAT, mucous membranes moist   Neck: Supple, no thyromegaly, no lymphadenopathy, trachea midline   Respiratory: Wheezing throughout.  With secretory sounds.  Mild respiratory distress   Cardiovascular: RRR, no murmurs, rubs, or gallops, palpable pedal pulses bilaterally   Gastrointestinal: Positive bowel sounds, soft, nontender, nondistended   Musculoskeletal: No bilateral ankle edema, no clubbing or cyanosis to extremities   Psychiatric: Appropriate affect, cooperative   Neurologic: Oriented x 3, strength symmetric in all extremities, Cranial Nerves grossly intact to confrontation, speech clear   Skin: No rashes     Result Review    Result Review:  I have personally reviewed the results from the time of this admission to 12/8/2023 18:50 EST and agree with these findings:  Imaging and labs were reviewed      Assessment & Plan   Assessment / Plan      Assessment/Plan:   Acute hypoxemic respiratory failure  Acute COPD exacerbation  Coronary artery disease  Dyslipidemia  Peripheral vascular disease  Heart failure with grade 2 diastolic dysfunction and EF of 51 to 55%    Plan:  --Admit to hospital service  -- Continue ox for saturations less than 90%  -- Xopenex due to allergy to albuterol  --Prednisone 40  -- Symbicort  -- Does not appear to have a pneumonia will not start antibiotics  -- COVID/influenza/RSV  -- Have to watch volume status closely will continue Lasix      DVT prophylaxis:  Lovenox    CODE STATUS:     Full code    Admission Status:  I believe this patient meets admission status.    Electronically signed by Boo Ruano MD, 12/08/23, 6:50 PM EST.

## 2023-12-09 LAB
ALBUMIN SERPL-MCNC: 3.8 G/DL (ref 3.5–5.2)
ALBUMIN/GLOB SERPL: 0.9 G/DL
ALP SERPL-CCNC: 88 U/L (ref 39–117)
ALT SERPL W P-5'-P-CCNC: 19 U/L (ref 1–33)
ANION GAP SERPL CALCULATED.3IONS-SCNC: 15.1 MMOL/L (ref 5–15)
AST SERPL-CCNC: 25 U/L (ref 1–32)
BASOPHILS # BLD AUTO: 0.01 10*3/MM3 (ref 0–0.2)
BASOPHILS NFR BLD AUTO: 0.2 % (ref 0–1.5)
BILIRUB SERPL-MCNC: 0.4 MG/DL (ref 0–1.2)
BUN SERPL-MCNC: 21 MG/DL (ref 8–23)
BUN/CREAT SERPL: 16.7 (ref 7–25)
CALCIUM SPEC-SCNC: 9.6 MG/DL (ref 8.6–10.5)
CHLORIDE SERPL-SCNC: 95 MMOL/L (ref 98–107)
CO2 SERPL-SCNC: 22.9 MMOL/L (ref 22–29)
CREAT SERPL-MCNC: 1.26 MG/DL (ref 0.57–1)
DEPRECATED RDW RBC AUTO: 43.5 FL (ref 37–54)
EGFRCR SERPLBLD CKD-EPI 2021: 48.4 ML/MIN/1.73
EOSINOPHIL # BLD AUTO: 0 10*3/MM3 (ref 0–0.4)
EOSINOPHIL NFR BLD AUTO: 0 % (ref 0.3–6.2)
ERYTHROCYTE [DISTWIDTH] IN BLOOD BY AUTOMATED COUNT: 14.6 % (ref 12.3–15.4)
GLOBULIN UR ELPH-MCNC: 4.4 GM/DL
GLUCOSE SERPL-MCNC: 148 MG/DL (ref 65–99)
HCT VFR BLD AUTO: 42.7 % (ref 34–46.6)
HGB BLD-MCNC: 13.8 G/DL (ref 12–15.9)
IMM GRANULOCYTES # BLD AUTO: 0.02 10*3/MM3 (ref 0–0.05)
IMM GRANULOCYTES NFR BLD AUTO: 0.4 % (ref 0–0.5)
L PNEUMO1 AG UR QL IA: NEGATIVE
LYMPHOCYTES # BLD AUTO: 0.51 10*3/MM3 (ref 0.7–3.1)
LYMPHOCYTES NFR BLD AUTO: 11.4 % (ref 19.6–45.3)
MAGNESIUM SERPL-MCNC: 2.3 MG/DL (ref 1.6–2.4)
MCH RBC QN AUTO: 26.6 PG (ref 26.6–33)
MCHC RBC AUTO-ENTMCNC: 32.3 G/DL (ref 31.5–35.7)
MCV RBC AUTO: 82.3 FL (ref 79–97)
MONOCYTES # BLD AUTO: 0.18 10*3/MM3 (ref 0.1–0.9)
MONOCYTES NFR BLD AUTO: 4 % (ref 5–12)
NEUTROPHILS NFR BLD AUTO: 3.77 10*3/MM3 (ref 1.7–7)
NEUTROPHILS NFR BLD AUTO: 84 % (ref 42.7–76)
NRBC BLD AUTO-RTO: 0 /100 WBC (ref 0–0.2)
PLATELET # BLD AUTO: 259 10*3/MM3 (ref 140–450)
PMV BLD AUTO: 9.3 FL (ref 6–12)
POTASSIUM SERPL-SCNC: 3.3 MMOL/L (ref 3.5–5.2)
PROCALCITONIN SERPL-MCNC: 0.14 NG/ML (ref 0–0.25)
PROT SERPL-MCNC: 8.2 G/DL (ref 6–8.5)
RBC # BLD AUTO: 5.19 10*6/MM3 (ref 3.77–5.28)
S PNEUM AG SPEC QL LA: NEGATIVE
SODIUM SERPL-SCNC: 133 MMOL/L (ref 136–145)
WBC NRBC COR # BLD AUTO: 4.49 10*3/MM3 (ref 3.4–10.8)

## 2023-12-09 PROCEDURE — 63710000001 REVEFENACIN 175 MCG/3ML SOLUTION: Performed by: PHYSICIAN ASSISTANT

## 2023-12-09 PROCEDURE — 25010000002 LORAZEPAM PER 2 MG: Performed by: FAMILY MEDICINE

## 2023-12-09 PROCEDURE — 84145 PROCALCITONIN (PCT): CPT | Performed by: INTERNAL MEDICINE

## 2023-12-09 PROCEDURE — 36415 COLL VENOUS BLD VENIPUNCTURE: CPT | Performed by: INTERNAL MEDICINE

## 2023-12-09 PROCEDURE — 94799 UNLISTED PULMONARY SVC/PX: CPT

## 2023-12-09 PROCEDURE — 80053 COMPREHEN METABOLIC PANEL: CPT | Performed by: INTERNAL MEDICINE

## 2023-12-09 PROCEDURE — 99233 SBSQ HOSP IP/OBS HIGH 50: CPT | Performed by: INTERNAL MEDICINE

## 2023-12-09 PROCEDURE — 87205 SMEAR GRAM STAIN: CPT | Performed by: PHYSICIAN ASSISTANT

## 2023-12-09 PROCEDURE — 94664 DEMO&/EVAL PT USE INHALER: CPT

## 2023-12-09 PROCEDURE — 83735 ASSAY OF MAGNESIUM: CPT | Performed by: INTERNAL MEDICINE

## 2023-12-09 PROCEDURE — 85025 COMPLETE CBC W/AUTO DIFF WBC: CPT | Performed by: INTERNAL MEDICINE

## 2023-12-09 PROCEDURE — 87070 CULTURE OTHR SPECIMN AEROBIC: CPT | Performed by: PHYSICIAN ASSISTANT

## 2023-12-09 PROCEDURE — 63710000001 PREDNISONE PER 5 MG: Performed by: INTERNAL MEDICINE

## 2023-12-09 RX ORDER — LORAZEPAM 2 MG/ML
0.5 INJECTION INTRAMUSCULAR ONCE
Status: COMPLETED | OUTPATIENT
Start: 2023-12-09 | End: 2023-12-09

## 2023-12-09 RX ORDER — DOXYCYCLINE 100 MG/1
100 CAPSULE ORAL EVERY 12 HOURS SCHEDULED
Status: COMPLETED | OUTPATIENT
Start: 2023-12-09 | End: 2023-12-13

## 2023-12-09 RX ORDER — PANTOPRAZOLE SODIUM 40 MG/1
40 TABLET, DELAYED RELEASE ORAL DAILY
Status: DISCONTINUED | OUTPATIENT
Start: 2023-12-09 | End: 2023-12-14 | Stop reason: HOSPADM

## 2023-12-09 RX ORDER — ECHINACEA PURPUREA EXTRACT 125 MG
2 TABLET ORAL
Status: DISCONTINUED | OUTPATIENT
Start: 2023-12-09 | End: 2023-12-14 | Stop reason: HOSPADM

## 2023-12-09 RX ORDER — ASPIRIN 81 MG/1
81 TABLET ORAL DAILY
Status: DISCONTINUED | OUTPATIENT
Start: 2023-12-09 | End: 2023-12-09

## 2023-12-09 RX ORDER — ISOSORBIDE MONONITRATE 30 MG/1
60 TABLET, EXTENDED RELEASE ORAL 2 TIMES DAILY
Status: DISCONTINUED | OUTPATIENT
Start: 2023-12-09 | End: 2023-12-14 | Stop reason: HOSPADM

## 2023-12-09 RX ORDER — FLUTICASONE PROPIONATE 50 MCG
2 SPRAY, SUSPENSION (ML) NASAL DAILY
Status: DISCONTINUED | OUTPATIENT
Start: 2023-12-09 | End: 2023-12-14 | Stop reason: HOSPADM

## 2023-12-09 RX ORDER — ASPIRIN 81 MG/1
81 TABLET ORAL NIGHTLY
Status: DISCONTINUED | OUTPATIENT
Start: 2023-12-09 | End: 2023-12-14 | Stop reason: HOSPADM

## 2023-12-09 RX ADMIN — LEVALBUTEROL HYDROCHLORIDE 1.25 MG: 1.25 SOLUTION RESPIRATORY (INHALATION) at 10:45

## 2023-12-09 RX ADMIN — ASPIRIN 81 MG: 81 TABLET, COATED ORAL at 21:37

## 2023-12-09 RX ADMIN — LEVALBUTEROL HYDROCHLORIDE 1.25 MG: 1.25 SOLUTION RESPIRATORY (INHALATION) at 20:06

## 2023-12-09 RX ADMIN — METOPROLOL TARTRATE 50 MG: 50 TABLET, FILM COATED ORAL at 21:37

## 2023-12-09 RX ADMIN — REVEFENACIN 175 MCG: 175 SOLUTION RESPIRATORY (INHALATION) at 10:45

## 2023-12-09 RX ADMIN — DOXYCYCLINE 100 MG: 100 CAPSULE ORAL at 21:37

## 2023-12-09 RX ADMIN — CLOPIDOGREL BISULFATE 75 MG: 75 TABLET ORAL at 10:06

## 2023-12-09 RX ADMIN — LORAZEPAM 0.5 MG: 2 INJECTION INTRAMUSCULAR; INTRAVENOUS at 21:36

## 2023-12-09 RX ADMIN — LEVALBUTEROL HYDROCHLORIDE 1.25 MG: 1.25 SOLUTION RESPIRATORY (INHALATION) at 07:39

## 2023-12-09 RX ADMIN — PREDNISONE 40 MG: 10 TABLET ORAL at 10:05

## 2023-12-09 RX ADMIN — PANTOPRAZOLE SODIUM 40 MG: 40 TABLET, DELAYED RELEASE ORAL at 10:06

## 2023-12-09 RX ADMIN — ISOSORBIDE MONONITRATE 60 MG: 60 TABLET, EXTENDED RELEASE ORAL at 21:37

## 2023-12-09 RX ADMIN — Medication 10 ML: at 10:29

## 2023-12-09 RX ADMIN — DOXYCYCLINE 100 MG: 100 CAPSULE ORAL at 13:59

## 2023-12-09 RX ADMIN — ISOSORBIDE MONONITRATE 60 MG: 60 TABLET, EXTENDED RELEASE ORAL at 11:38

## 2023-12-09 RX ADMIN — METOPROLOL TARTRATE 50 MG: 50 TABLET, FILM COATED ORAL at 10:05

## 2023-12-09 RX ADMIN — Medication 10 ML: at 21:37

## 2023-12-09 RX ADMIN — ACETAMINOPHEN 650 MG: 325 TABLET ORAL at 19:40

## 2023-12-09 RX ADMIN — LEVALBUTEROL HYDROCHLORIDE 1.25 MG: 1.25 SOLUTION RESPIRATORY (INHALATION) at 15:27

## 2023-12-09 RX ADMIN — LEVALBUTEROL HYDROCHLORIDE 1.25 MG: 1.25 SOLUTION RESPIRATORY (INHALATION) at 03:15

## 2023-12-09 RX ADMIN — LEVALBUTEROL HYDROCHLORIDE 1.25 MG: 1.25 SOLUTION RESPIRATORY (INHALATION) at 23:10

## 2023-12-09 RX ADMIN — ATORVASTATIN CALCIUM 20 MG: 40 TABLET, FILM COATED ORAL at 21:37

## 2023-12-09 NOTE — PROGRESS NOTES
[facility]   Hospitalist Progress Note  Date: 2023  Patient Name: Shaneka Guido  : 1960  MRN: [id number]  Date of admission: 2023      Subjective   Subjective     Chief Complaint: Shortness of    Summary: Patient 63-year-old female past medical history significant for coronary artery disease status post CABG, peripheral vascular disease status post multiple revascularizations, diastolic congestive heart failure, COPD with previous smoking history that presents to the emergency department with shortness of breath evaluated in the emergency department chest x-ray negative remained symptomatic admitted to the hospitalist service started on antibiotics steroids nebulizer treatments RSV returned positive.    Interval Followup: Patient seen and examined resting comfortably in bed states she remains short of air with significant wheezing concerned about home medications which have been resumed however will continue to hold hydrochlorothiazide given hyponatremia.  Continue with nebulizer treatments steroids adding doxycycline patient complains of yellow sputum production had flutter valve spirometry check sputum culture    Review of systems: All systems reviewed negative septa following: Patient complains of generalized weakness fatigue shortness of air and wheezing  Objective   Objective     Vitals:   Temp:  [97.8 °F (36.6 °C)-98.2 °F (36.8 °C)] 97.8 °F (36.6 °C)  Heart Rate:  [] 91  Resp:  [18-28] 22  BP: (114-148)/() 142/73  Flow (L/min):  [2] 2    Physical Exam   Constitutional: Awake alert oriented no acute distress  Respiratory: Expiratory wheezes with increased expiratory phase  Cardiovascular: RRR  GI:-Soft nontender bowel sounds were    Result Review    Result Review:  I have reviewed the following:  [x]  Laboratory  CBC          2023    14:52 2023    06:24   CBC   WBC 6.42  4.49    RBC 5.36  5.19    Hemoglobin 14.5  13.8    Hematocrit 44.1  42.7    MCV 82.3  82.3     MCH 27.1  26.6    MCHC 32.9  32.3    RDW 14.4  14.6    Platelets 292  259    `  CMP          11/10/2023    13:31 12/8/2023    14:52 12/9/2023    06:24   CMP   Glucose  105  148    Glucose 101         BUN 28     17  21    Creatinine 1.2     1.17  1.26    EGFR  52.9  48.4    Sodium 137     132  133    Potassium 3.3     3.3  3.3    Chloride 96     94  95    Calcium 9.7     9.5  9.6    Total Protein  8.4  8.2    Albumin  4.1  3.8    Globulin  4.3  4.4    Total Bilirubin  0.5  0.4    Alkaline Phosphatase  93  88    AST (SGOT)  21  25    ALT (SGPT)  18  19    Albumin/Globulin Ratio  1.0  0.9    BUN/Creatinine Ratio  14.5  16.7    Anion Gap 14     13.2  15.1       Details          This result is from an external source.               []  Microbiology  [x]  Radiology  []  EKG/Telemetry   []  Cardiology/Vascular   []  Pathology  []  Old records  []  Other:    Assessment & Plan   Assessment / Plan   Assessment:    Acute hypoxemic respiratory failure  Acute exacerbation of chronic obstructive pulmonary disease  RSV  CAD  PAD  ABBIE  Hypertension  Hyperlipidemia      Plan:    Continue nebulizer treatments  Continue oral steroids  Adding doxycycline  Add flutter valve incentive spirometry check sputum culture  Resuming appropriate home medications  Hold hydrochlorothiazide and Lasix  Protonix for GI prophylaxis  PT OT consultations  Lovenox for DVT prophylaxis  Further treatment contingent upon her hospital course    Discussed plan with RN.    DVT prophylaxis:  Medical DVT prophylaxis orders are present.    CODE STATUS:   Level Of Support Discussed With: Patient  Code Status (Patient has no pulse and is not breathing): CPR (Attempt to Resuscitate)  Medical Interventions (Patient has pulse or is breathing): Full Support        Electronically signed by PHUC Jarrett, 12/09/23, 12:35 PM EST.         Attending Documentation:  Patient independently seen and evaluated, above documentation reflects plan put forth during bedside  rounds.  More than 51% of the time of this patient encounter was performed by me. I discussed the care plan with VALERIANO Rey PA-C, I agree with his findings and plan as documented, what I have added to the care plan and modified is as follows in my documentation and my medical decision making; 63-year-old female with COPD, coronary disease presented with shortness of breath and hypoxia.  Tested positive for RSV.  I will continue her on steroids and breathing treatments.  Will benefit from flutter valve and incentive spirometry.  Given her hyponatremia I will hold her HCTZ.  Resumed appropriate home medications.  On exam she is in no distress, her respiratory exam is notable for diminished breath sounds with wheezing but no distress.  Electronically signed by Steve Fagan MD, 12/09/23, 1:33 PM EST.

## 2023-12-10 ENCOUNTER — APPOINTMENT (OUTPATIENT)
Dept: GENERAL RADIOLOGY | Facility: HOSPITAL | Age: 63
End: 2023-12-10
Payer: COMMERCIAL

## 2023-12-10 LAB
ALBUMIN SERPL-MCNC: 4.1 G/DL (ref 3.5–5.2)
ANION GAP SERPL CALCULATED.3IONS-SCNC: 17.6 MMOL/L (ref 5–15)
ARTERIAL PATENCY WRIST A: POSITIVE
BASE EXCESS BLDA CALC-SCNC: -2.7 MMOL/L (ref -2–2)
BASOPHILS # BLD AUTO: 0.03 10*3/MM3 (ref 0–0.2)
BASOPHILS NFR BLD AUTO: 0.3 % (ref 0–1.5)
BDY SITE: ABNORMAL
BUN SERPL-MCNC: 37 MG/DL (ref 8–23)
BUN/CREAT SERPL: 31.1 (ref 7–25)
CALCIUM SPEC-SCNC: 9.7 MG/DL (ref 8.6–10.5)
CHLORIDE SERPL-SCNC: 95 MMOL/L (ref 98–107)
CO2 SERPL-SCNC: 21.4 MMOL/L (ref 22–29)
COHGB MFR BLD: 0.2 % (ref 0–1.5)
CREAT SERPL-MCNC: 1.19 MG/DL (ref 0.57–1)
DEPRECATED RDW RBC AUTO: 44.2 FL (ref 37–54)
EGFRCR SERPLBLD CKD-EPI 2021: 51.8 ML/MIN/1.73
EOSINOPHIL # BLD AUTO: 0 10*3/MM3 (ref 0–0.4)
EOSINOPHIL NFR BLD AUTO: 0 % (ref 0.3–6.2)
ERYTHROCYTE [DISTWIDTH] IN BLOOD BY AUTOMATED COUNT: 14.6 % (ref 12.3–15.4)
FHHB: 7.5 % (ref 0–5)
GAS FLOW AIRWAY: 3 LPM
GLUCOSE SERPL-MCNC: 101 MG/DL (ref 65–99)
HCO3 BLDA-SCNC: 22.3 MMOL/L (ref 22–26)
HCT VFR BLD AUTO: 45.2 % (ref 34–46.6)
HGB BLD-MCNC: 14.5 G/DL (ref 12–15.9)
HGB BLDA-MCNC: 15.3 G/DL (ref 11.7–14.6)
IMM GRANULOCYTES # BLD AUTO: 0.04 10*3/MM3 (ref 0–0.05)
IMM GRANULOCYTES NFR BLD AUTO: 0.4 % (ref 0–0.5)
LYMPHOCYTES # BLD AUTO: 0.84 10*3/MM3 (ref 0.7–3.1)
LYMPHOCYTES NFR BLD AUTO: 8.4 % (ref 19.6–45.3)
MCH RBC QN AUTO: 26.9 PG (ref 26.6–33)
MCHC RBC AUTO-ENTMCNC: 32.1 G/DL (ref 31.5–35.7)
MCV RBC AUTO: 83.9 FL (ref 79–97)
METHGB BLD QL: 0.2 % (ref 0–1.5)
MODALITY: ABNORMAL
MONOCYTES # BLD AUTO: 0.52 10*3/MM3 (ref 0.1–0.9)
MONOCYTES NFR BLD AUTO: 5.2 % (ref 5–12)
NEUTROPHILS NFR BLD AUTO: 8.55 10*3/MM3 (ref 1.7–7)
NEUTROPHILS NFR BLD AUTO: 85.7 % (ref 42.7–76)
NRBC BLD AUTO-RTO: 0 /100 WBC (ref 0–0.2)
NT-PROBNP SERPL-MCNC: 649.3 PG/ML (ref 0–900)
OXYHGB MFR BLDV: 92.1 % (ref 94–99)
PCO2 BLDA: 39.6 MM HG (ref 35–45)
PH BLDA: 7.37 PH UNITS (ref 7.35–7.45)
PHOSPHATE SERPL-MCNC: 3.7 MG/DL (ref 2.5–4.5)
PLATELET # BLD AUTO: 304 10*3/MM3 (ref 140–450)
PMV BLD AUTO: 9.7 FL (ref 6–12)
PO2 BLDA: 67.7 MM HG (ref 80–100)
POTASSIUM SERPL-SCNC: 3.6 MMOL/L (ref 3.5–5.2)
PROCALCITONIN SERPL-MCNC: 0.15 NG/ML (ref 0–0.25)
QT INTERVAL: 380 MS
QTC INTERVAL: 454 MS
RBC # BLD AUTO: 5.39 10*6/MM3 (ref 3.77–5.28)
SAO2 % BLDCOA: 92.5 % (ref 95–99)
SODIUM SERPL-SCNC: 134 MMOL/L (ref 136–145)
WBC NRBC COR # BLD AUTO: 9.98 10*3/MM3 (ref 3.4–10.8)

## 2023-12-10 PROCEDURE — 82805 BLOOD GASES W/O2 SATURATION: CPT | Performed by: PHYSICIAN ASSISTANT

## 2023-12-10 PROCEDURE — 99233 SBSQ HOSP IP/OBS HIGH 50: CPT | Performed by: INTERNAL MEDICINE

## 2023-12-10 PROCEDURE — 85025 COMPLETE CBC W/AUTO DIFF WBC: CPT | Performed by: INTERNAL MEDICINE

## 2023-12-10 PROCEDURE — 25010000002 ENOXAPARIN PER 10 MG: Performed by: INTERNAL MEDICINE

## 2023-12-10 PROCEDURE — 83050 HGB METHEMOGLOBIN QUAN: CPT | Performed by: PHYSICIAN ASSISTANT

## 2023-12-10 PROCEDURE — 94799 UNLISTED PULMONARY SVC/PX: CPT

## 2023-12-10 PROCEDURE — 80069 RENAL FUNCTION PANEL: CPT | Performed by: INTERNAL MEDICINE

## 2023-12-10 PROCEDURE — 25010000002 METHYLPREDNISOLONE PER 40 MG: Performed by: PHYSICIAN ASSISTANT

## 2023-12-10 PROCEDURE — 84145 PROCALCITONIN (PCT): CPT | Performed by: PHYSICIAN ASSISTANT

## 2023-12-10 PROCEDURE — 94664 DEMO&/EVAL PT USE INHALER: CPT

## 2023-12-10 PROCEDURE — 63710000001 REVEFENACIN 175 MCG/3ML SOLUTION: Performed by: PHYSICIAN ASSISTANT

## 2023-12-10 PROCEDURE — 71045 X-RAY EXAM CHEST 1 VIEW: CPT

## 2023-12-10 PROCEDURE — 94761 N-INVAS EAR/PLS OXIMETRY MLT: CPT

## 2023-12-10 PROCEDURE — 82375 ASSAY CARBOXYHB QUANT: CPT | Performed by: PHYSICIAN ASSISTANT

## 2023-12-10 PROCEDURE — 36600 WITHDRAWAL OF ARTERIAL BLOOD: CPT | Performed by: PHYSICIAN ASSISTANT

## 2023-12-10 PROCEDURE — 83880 ASSAY OF NATRIURETIC PEPTIDE: CPT | Performed by: PHYSICIAN ASSISTANT

## 2023-12-10 RX ORDER — ARFORMOTEROL TARTRATE 15 UG/2ML
15 SOLUTION RESPIRATORY (INHALATION)
Status: DISCONTINUED | OUTPATIENT
Start: 2023-12-10 | End: 2023-12-14 | Stop reason: HOSPADM

## 2023-12-10 RX ORDER — ALPRAZOLAM 0.25 MG/1
0.5 TABLET ORAL ONCE
Status: COMPLETED | OUTPATIENT
Start: 2023-12-10 | End: 2023-12-10

## 2023-12-10 RX ORDER — METHYLPREDNISOLONE SODIUM SUCCINATE 40 MG/ML
40 INJECTION, POWDER, LYOPHILIZED, FOR SOLUTION INTRAMUSCULAR; INTRAVENOUS EVERY 8 HOURS
Status: DISCONTINUED | OUTPATIENT
Start: 2023-12-10 | End: 2023-12-14

## 2023-12-10 RX ORDER — ALPRAZOLAM 0.25 MG/1
0.25 TABLET ORAL 3 TIMES DAILY PRN
Status: DISCONTINUED | OUTPATIENT
Start: 2023-12-10 | End: 2023-12-14 | Stop reason: HOSPADM

## 2023-12-10 RX ORDER — TRAZODONE HYDROCHLORIDE 100 MG/1
100 TABLET ORAL ONCE
Status: COMPLETED | OUTPATIENT
Start: 2023-12-10 | End: 2023-12-10

## 2023-12-10 RX ADMIN — ACETAMINOPHEN 650 MG: 325 TABLET ORAL at 20:38

## 2023-12-10 RX ADMIN — METOPROLOL TARTRATE 50 MG: 50 TABLET, FILM COATED ORAL at 20:37

## 2023-12-10 RX ADMIN — LEVALBUTEROL HYDROCHLORIDE 1.25 MG: 1.25 SOLUTION RESPIRATORY (INHALATION) at 07:21

## 2023-12-10 RX ADMIN — METHYLPREDNISOLONE SODIUM SUCCINATE 40 MG: 40 INJECTION INTRAMUSCULAR; INTRAVENOUS at 17:29

## 2023-12-10 RX ADMIN — REVEFENACIN 175 MCG: 175 SOLUTION RESPIRATORY (INHALATION) at 10:00

## 2023-12-10 RX ADMIN — ALPRAZOLAM 0.25 MG: 0.25 TABLET ORAL at 17:29

## 2023-12-10 RX ADMIN — PANTOPRAZOLE SODIUM 40 MG: 40 TABLET, DELAYED RELEASE ORAL at 09:19

## 2023-12-10 RX ADMIN — SALINE NASAL SPRAY 2 SPRAY: 1.5 SOLUTION NASAL at 03:14

## 2023-12-10 RX ADMIN — LEVALBUTEROL HYDROCHLORIDE 1.25 MG: 1.25 SOLUTION RESPIRATORY (INHALATION) at 02:42

## 2023-12-10 RX ADMIN — DOXYCYCLINE 100 MG: 100 CAPSULE ORAL at 20:37

## 2023-12-10 RX ADMIN — ENOXAPARIN SODIUM 40 MG: 100 INJECTION SUBCUTANEOUS at 09:20

## 2023-12-10 RX ADMIN — ATORVASTATIN CALCIUM 20 MG: 40 TABLET, FILM COATED ORAL at 20:36

## 2023-12-10 RX ADMIN — LEVALBUTEROL HYDROCHLORIDE 1.25 MG: 1.25 SOLUTION RESPIRATORY (INHALATION) at 15:13

## 2023-12-10 RX ADMIN — LEVALBUTEROL HYDROCHLORIDE 1.25 MG: 1.25 SOLUTION RESPIRATORY (INHALATION) at 11:53

## 2023-12-10 RX ADMIN — ARFORMOTEROL TARTRATE 15 MCG: 15 SOLUTION RESPIRATORY (INHALATION) at 11:45

## 2023-12-10 RX ADMIN — LEVALBUTEROL HYDROCHLORIDE 1.25 MG: 1.25 SOLUTION RESPIRATORY (INHALATION) at 19:08

## 2023-12-10 RX ADMIN — ARFORMOTEROL TARTRATE 15 MCG: 15 SOLUTION RESPIRATORY (INHALATION) at 19:08

## 2023-12-10 RX ADMIN — Medication 10 ML: at 20:38

## 2023-12-10 RX ADMIN — DOXYCYCLINE 100 MG: 100 CAPSULE ORAL at 09:19

## 2023-12-10 RX ADMIN — METHYLPREDNISOLONE SODIUM SUCCINATE 40 MG: 40 INJECTION INTRAMUSCULAR; INTRAVENOUS at 09:23

## 2023-12-10 RX ADMIN — ALPRAZOLAM 0.5 MG: 0.25 TABLET ORAL at 09:19

## 2023-12-10 RX ADMIN — METOPROLOL TARTRATE 50 MG: 50 TABLET, FILM COATED ORAL at 09:19

## 2023-12-10 RX ADMIN — ASPIRIN 81 MG: 81 TABLET, COATED ORAL at 20:37

## 2023-12-10 RX ADMIN — CLOPIDOGREL BISULFATE 75 MG: 75 TABLET ORAL at 09:19

## 2023-12-10 RX ADMIN — ISOSORBIDE MONONITRATE 60 MG: 60 TABLET, EXTENDED RELEASE ORAL at 09:19

## 2023-12-10 RX ADMIN — ISOSORBIDE MONONITRATE 60 MG: 60 TABLET, EXTENDED RELEASE ORAL at 20:37

## 2023-12-10 RX ADMIN — TRAZODONE HYDROCHLORIDE 100 MG: 100 TABLET ORAL at 03:14

## 2023-12-10 RX ADMIN — Medication 10 ML: at 09:23

## 2023-12-10 NOTE — PROGRESS NOTES
[facility]   Hospitalist Progress Note  Date: 12/10/2023  Patient Name: Shaneka Guido  : 1960  MRN: [id number]  Date of admission: 2023      Subjective   Subjective     Chief Complaint: Shortness of    Summary: Patient 63-year-old female past medical history significant for coronary artery disease status post CABG, peripheral vascular disease status post multiple revascularizations, diastolic congestive heart failure, COPD with previous smoking history that presents to the emergency department with shortness of breath evaluated in the emergency department chest x-ray negative remained symptomatic admitted to the hospitalist service started on antibiotics steroids nebulizer treatments RSV returned positive.    Interval Followup: Patient seen and examined sitting upright in bed more tachypneic worsening shortness of air worsening wheezing is also very anxious.  Ordered stat chest x-ray ABG checking BNP changing from oral to IV steroids continuing antibiotics and nebulizer treatments.  Continuing supplemental oxygen placing on continuous pulse oximetry and cardiac telemetry    Review of systems: All systems reviewed negative septa following: Patient complains of generalized weakness fatigue shortness of air and wheezing  Objective   Objective     Vitals:   Temp:  [97.2 °F (36.2 °C)-97.9 °F (36.6 °C)] 97.7 °F (36.5 °C)  Heart Rate:  [] 97  Resp:  [20-24] 20  BP: (140-199)/(66-76) 176/76  Flow (L/min):  [3] 3    Physical Exam   Constitutional: Awake alert oriented no acute distress  Respiratory: Worsening expiratory wheezes with increased expiratory phase  Cardiovascular: RRR  GI:-Soft nontender bowel sounds were    Result Review    Result Review:  I have reviewed the following:  [x]  Laboratory  CBC          2023    14:52 2023    06:24 12/10/2023    04:53   CBC   WBC 6.42  4.49  9.98    RBC 5.36  5.19  5.39    Hemoglobin 14.5  13.8  14.5    Hematocrit 44.1  42.7  45.2    MCV 82.3   82.3  83.9    MCH 27.1  26.6  26.9    MCHC 32.9  32.3  32.1    RDW 14.4  14.6  14.6    Platelets 292  259  304    `  CMP          12/8/2023    14:52 12/9/2023    06:24 12/10/2023    04:53   CMP   Glucose 105  148  101    BUN 17  21  37    Creatinine 1.17  1.26  1.19    EGFR 52.9  48.4  51.8    Sodium 132  133  134    Potassium 3.3  3.3  3.6    Chloride 94  95  95    Calcium 9.5  9.6  9.7    Total Protein 8.4  8.2     Albumin 4.1  3.8  4.1    Globulin 4.3  4.4     Total Bilirubin 0.5  0.4     Alkaline Phosphatase 93  88     AST (SGOT) 21  25     ALT (SGPT) 18  19     Albumin/Globulin Ratio 1.0  0.9     BUN/Creatinine Ratio 14.5  16.7  31.1    Anion Gap 13.2  15.1  17.6        []  Microbiology  [x]  Radiology  []  EKG/Telemetry   []  Cardiology/Vascular   []  Pathology  []  Old records  []  Other:    Assessment & Plan   Assessment / Plan   Assessment:    Acute hypoxemic respiratory failure  Acute exacerbation of chronic obstructive pulmonary disease  RSV  CAD  PAD  ABBIE  Hypertension  Hyperlipidemia      Plan:    Checking chest x-ray ABG BNP  Placing on continuous pulse oximetry placing on continuous cardiac telemetry  Changing from p.o. to IV steroids  Continue antibiotics  Adding anxiolytics  Continue nebulizer treatments  Add flutter valve incentive spirometry check sputum culture  Resuming appropriate home medications  Hold hydrochlorothiazide and Lasix for now  Protonix for GI prophylaxis  PT OT consultations  Lovenox for DVT prophylaxis  Further treatment contingent upon her hospital course    Discussed plan with RN.    DVT prophylaxis:  Medical DVT prophylaxis orders are present.    CODE STATUS:   Level Of Support Discussed With: Patient  Code Status (Patient has no pulse and is not breathing): CPR (Attempt to Resuscitate)  Medical Interventions (Patient has pulse or is breathing): Full Support        Electronically signed by PHUC Jarrett, 12/10/23, 12:40 PM EST.           Attending  Documentation:  Patient independently seen and evaluated, above documentation reflects plan put forth during bedside rounds.  More than 51% of the time of this patient encounter was performed by me. I discussed the care plan with VALERIANO Rey PA-C, I agree with his findings and plan as documented, what I have added to the care plan and modified is as follows in my documentation and my medical decision making; 63-year-old female with COPD, coronary disease presented with shortness of breath and hypoxia.  Tested positive for RSV.  I will continue her on steroids and breathing treatments.  Complaining of more chest tightness and wheezing today.  Appears anxious.  Sats have still been in the 90s.  Was getting ABG, patient not retaining CO2.  Added anxiolytic with Xanax.  Adding IV steroids.  Continue pulmonary toilet.  Discussed nebs with RT.  Electronically signed by Steve Fagan MD, 12/10/23, 2:39 PM EST.

## 2023-12-10 NOTE — PLAN OF CARE
Goal Outcome Evaluation:  Plan of Care Reviewed With: patient        Progress: no change  Outcome Evaluation: pt. aao x 4, voiding without difficulty.  pt. has been anxious this shift with  being made aware with orders noted.  neb tx given per order with pt. tolerating them.  pt. c/o feeling like she can't breath at times.

## 2023-12-10 NOTE — PLAN OF CARE
Goal Outcome Evaluation:  Plan of Care Reviewed With: patient        Progress: improving  Outcome Evaluation: Patient VSS and A/Ox4. voiding well. recieving neb treatments around the clock. New IV placed to the left AC

## 2023-12-11 ENCOUNTER — APPOINTMENT (OUTPATIENT)
Dept: CARDIOLOGY | Facility: HOSPITAL | Age: 63
End: 2023-12-11
Payer: COMMERCIAL

## 2023-12-11 ENCOUNTER — APPOINTMENT (OUTPATIENT)
Dept: CT IMAGING | Facility: HOSPITAL | Age: 63
End: 2023-12-11
Payer: COMMERCIAL

## 2023-12-11 LAB
ALBUMIN SERPL-MCNC: 4.1 G/DL (ref 3.5–5.2)
ANION GAP SERPL CALCULATED.3IONS-SCNC: 14.1 MMOL/L (ref 5–15)
BACTERIA SPEC RESP CULT: NORMAL
BASOPHILS # BLD AUTO: 0.01 10*3/MM3 (ref 0–0.2)
BASOPHILS NFR BLD AUTO: 0.1 % (ref 0–1.5)
BH CV ECHO MEAS - AO MAX PG: 6 MMHG
BH CV ECHO MEAS - AO MEAN PG: 3.4 MMHG
BH CV ECHO MEAS - AO ROOT DIAM: 3.1 CM
BH CV ECHO MEAS - AO V2 MAX: 120 CM/SEC
BH CV ECHO MEAS - AO V2 VTI: 27.1 CM
BH CV ECHO MEAS - AVA(I,D): 2.26 CM2
BH CV ECHO MEAS - EDV(CUBED): 82.9 ML
BH CV ECHO MEAS - EDV(MOD-SP2): 79.3 ML
BH CV ECHO MEAS - EDV(MOD-SP4): 83.8 ML
BH CV ECHO MEAS - EF(MOD-BP): 39.4 %
BH CV ECHO MEAS - EF(MOD-SP2): 40.9 %
BH CV ECHO MEAS - EF(MOD-SP4): 36.2 %
BH CV ECHO MEAS - ESV(CUBED): 50.9 ML
BH CV ECHO MEAS - ESV(MOD-SP2): 46.9 ML
BH CV ECHO MEAS - ESV(MOD-SP4): 53.5 ML
BH CV ECHO MEAS - FS: 15 %
BH CV ECHO MEAS - IVS/LVPW: 1.03 CM
BH CV ECHO MEAS - IVSD: 1.12 CM
BH CV ECHO MEAS - LA DIMENSION: 4 CM
BH CV ECHO MEAS - LAT PEAK E' VEL: 6.9 CM/SEC
BH CV ECHO MEAS - LV DIASTOLIC VOL/BSA (35-75): 44.3 CM2
BH CV ECHO MEAS - LV MASS(C)D: 167.6 GRAMS
BH CV ECHO MEAS - LV MAX PG: 3.5 MMHG
BH CV ECHO MEAS - LV MEAN PG: 1.97 MMHG
BH CV ECHO MEAS - LV SYSTOLIC VOL/BSA (12-30): 28.3 CM2
BH CV ECHO MEAS - LV V1 MAX: 94 CM/SEC
BH CV ECHO MEAS - LV V1 VTI: 19.9 CM
BH CV ECHO MEAS - LVIDD: 4.4 CM
BH CV ECHO MEAS - LVIDS: 3.7 CM
BH CV ECHO MEAS - LVOT AREA: 3.1 CM2
BH CV ECHO MEAS - LVOT DIAM: 1.98 CM
BH CV ECHO MEAS - LVPWD: 1.09 CM
BH CV ECHO MEAS - MED PEAK E' VEL: 4.4 CM/SEC
BH CV ECHO MEAS - MR MAX PG: 95.8 MMHG
BH CV ECHO MEAS - MR MAX VEL: 489.5 CM/SEC
BH CV ECHO MEAS - MV A MAX VEL: 122.5 CM/SEC
BH CV ECHO MEAS - MV DEC SLOPE: 455.7 CM/SEC2
BH CV ECHO MEAS - MV DEC TIME: 0.21 SEC
BH CV ECHO MEAS - MV E MAX VEL: 110 CM/SEC
BH CV ECHO MEAS - MV E/A: 0.9
BH CV ECHO MEAS - MV MAX PG: 5.7 MMHG
BH CV ECHO MEAS - MV MEAN PG: 2.5 MMHG
BH CV ECHO MEAS - MV P1/2T: 69.1 MSEC
BH CV ECHO MEAS - MV V2 VTI: 33.4 CM
BH CV ECHO MEAS - MVA(P1/2T): 3.2 CM2
BH CV ECHO MEAS - MVA(VTI): 1.84 CM2
BH CV ECHO MEAS - RAP SYSTOLE: 8 MMHG
BH CV ECHO MEAS - RVDD: 2.38 CM
BH CV ECHO MEAS - RVSP: 48.7 MMHG
BH CV ECHO MEAS - SI(MOD-SP2): 17.1 ML/M2
BH CV ECHO MEAS - SI(MOD-SP4): 16 ML/M2
BH CV ECHO MEAS - SV(LVOT): 61.3 ML
BH CV ECHO MEAS - SV(MOD-SP2): 32.4 ML
BH CV ECHO MEAS - SV(MOD-SP4): 30.3 ML
BH CV ECHO MEAS - TR MAX PG: 40.7 MMHG
BH CV ECHO MEAS - TR MAX VEL: 319 CM/SEC
BH CV ECHO MEASUREMENTS AVERAGE E/E' RATIO: 19.47
BUN SERPL-MCNC: 32 MG/DL (ref 8–23)
BUN/CREAT SERPL: 28.8 (ref 7–25)
CALCIUM SPEC-SCNC: 9.7 MG/DL (ref 8.6–10.5)
CHLORIDE SERPL-SCNC: 97 MMOL/L (ref 98–107)
CO2 SERPL-SCNC: 25.9 MMOL/L (ref 22–29)
CREAT SERPL-MCNC: 1.11 MG/DL (ref 0.57–1)
DEPRECATED RDW RBC AUTO: 44.5 FL (ref 37–54)
EGFRCR SERPLBLD CKD-EPI 2021: 56.3 ML/MIN/1.73
EOSINOPHIL # BLD AUTO: 0 10*3/MM3 (ref 0–0.4)
EOSINOPHIL NFR BLD AUTO: 0 % (ref 0.3–6.2)
ERYTHROCYTE [DISTWIDTH] IN BLOOD BY AUTOMATED COUNT: 14.6 % (ref 12.3–15.4)
GLUCOSE SERPL-MCNC: 108 MG/DL (ref 65–99)
GRAM STN SPEC: NORMAL
HCT VFR BLD AUTO: 45 % (ref 34–46.6)
HGB BLD-MCNC: 14.4 G/DL (ref 12–15.9)
IMM GRANULOCYTES # BLD AUTO: 0.01 10*3/MM3 (ref 0–0.05)
IMM GRANULOCYTES NFR BLD AUTO: 0.1 % (ref 0–0.5)
IVRT: 114 MS
LEFT ATRIUM VOLUME INDEX: 28.8 ML/M2
LYMPHOCYTES # BLD AUTO: 0.69 10*3/MM3 (ref 0.7–3.1)
LYMPHOCYTES NFR BLD AUTO: 10.2 % (ref 19.6–45.3)
MCH RBC QN AUTO: 27 PG (ref 26.6–33)
MCHC RBC AUTO-ENTMCNC: 32 G/DL (ref 31.5–35.7)
MCV RBC AUTO: 84.3 FL (ref 79–97)
MONOCYTES # BLD AUTO: 0.38 10*3/MM3 (ref 0.1–0.9)
MONOCYTES NFR BLD AUTO: 5.6 % (ref 5–12)
NEUTROPHILS NFR BLD AUTO: 5.65 10*3/MM3 (ref 1.7–7)
NEUTROPHILS NFR BLD AUTO: 84 % (ref 42.7–76)
NRBC BLD AUTO-RTO: 0 /100 WBC (ref 0–0.2)
PHOSPHATE SERPL-MCNC: 4.8 MG/DL (ref 2.5–4.5)
PLATELET # BLD AUTO: 280 10*3/MM3 (ref 140–450)
PMV BLD AUTO: 9.3 FL (ref 6–12)
POTASSIUM SERPL-SCNC: 4 MMOL/L (ref 3.5–5.2)
RBC # BLD AUTO: 5.34 10*6/MM3 (ref 3.77–5.28)
SODIUM SERPL-SCNC: 137 MMOL/L (ref 136–145)
WBC NRBC COR # BLD AUTO: 6.74 10*3/MM3 (ref 3.4–10.8)

## 2023-12-11 PROCEDURE — 94664 DEMO&/EVAL PT USE INHALER: CPT

## 2023-12-11 PROCEDURE — 94761 N-INVAS EAR/PLS OXIMETRY MLT: CPT

## 2023-12-11 PROCEDURE — 93306 TTE W/DOPPLER COMPLETE: CPT | Performed by: INTERNAL MEDICINE

## 2023-12-11 PROCEDURE — 25010000002 METHYLPREDNISOLONE PER 40 MG: Performed by: PHYSICIAN ASSISTANT

## 2023-12-11 PROCEDURE — 99223 1ST HOSP IP/OBS HIGH 75: CPT | Performed by: INTERNAL MEDICINE

## 2023-12-11 PROCEDURE — 99233 SBSQ HOSP IP/OBS HIGH 50: CPT | Performed by: INTERNAL MEDICINE

## 2023-12-11 PROCEDURE — 94799 UNLISTED PULMONARY SVC/PX: CPT

## 2023-12-11 PROCEDURE — 25010000002 ENOXAPARIN PER 10 MG: Performed by: INTERNAL MEDICINE

## 2023-12-11 PROCEDURE — 63710000001 REVEFENACIN 175 MCG/3ML SOLUTION: Performed by: PHYSICIAN ASSISTANT

## 2023-12-11 PROCEDURE — 85025 COMPLETE CBC W/AUTO DIFF WBC: CPT | Performed by: INTERNAL MEDICINE

## 2023-12-11 PROCEDURE — 97166 OT EVAL MOD COMPLEX 45 MIN: CPT

## 2023-12-11 PROCEDURE — 93306 TTE W/DOPPLER COMPLETE: CPT

## 2023-12-11 PROCEDURE — 80069 RENAL FUNCTION PANEL: CPT | Performed by: INTERNAL MEDICINE

## 2023-12-11 RX ORDER — MONTELUKAST SODIUM 10 MG/1
10 TABLET ORAL NIGHTLY
Status: DISCONTINUED | OUTPATIENT
Start: 2023-12-11 | End: 2023-12-14 | Stop reason: HOSPADM

## 2023-12-11 RX ORDER — LEVALBUTEROL INHALATION SOLUTION 1.25 MG/3ML
1.25 SOLUTION RESPIRATORY (INHALATION)
Status: DISCONTINUED | OUTPATIENT
Start: 2023-12-11 | End: 2023-12-13

## 2023-12-11 RX ORDER — HYDROCHLOROTHIAZIDE 25 MG/1
25 TABLET ORAL DAILY
Status: DISCONTINUED | OUTPATIENT
Start: 2023-12-11 | End: 2023-12-14 | Stop reason: HOSPADM

## 2023-12-11 RX ORDER — CETIRIZINE HYDROCHLORIDE, PSEUDOEPHEDRINE HYDROCHLORIDE 5; 120 MG/1; MG/1
1 TABLET, FILM COATED, EXTENDED RELEASE ORAL 2 TIMES DAILY
Status: DISCONTINUED | OUTPATIENT
Start: 2023-12-11 | End: 2023-12-14 | Stop reason: HOSPADM

## 2023-12-11 RX ADMIN — ISOSORBIDE MONONITRATE 60 MG: 60 TABLET, EXTENDED RELEASE ORAL at 21:09

## 2023-12-11 RX ADMIN — LEVALBUTEROL HYDROCHLORIDE 1.25 MG: 1.25 SOLUTION RESPIRATORY (INHALATION) at 00:07

## 2023-12-11 RX ADMIN — ARFORMOTEROL TARTRATE 15 MCG: 15 SOLUTION RESPIRATORY (INHALATION) at 06:18

## 2023-12-11 RX ADMIN — ALPRAZOLAM 0.25 MG: 0.25 TABLET ORAL at 21:09

## 2023-12-11 RX ADMIN — CETIRIZINE HYDROCHLORIDE, PSEUDOEPHEDRINE HYDROCHLORIDE 1 TABLET: 5; 120 TABLET, FILM COATED, EXTENDED RELEASE ORAL at 21:07

## 2023-12-11 RX ADMIN — PANTOPRAZOLE SODIUM 40 MG: 40 TABLET, DELAYED RELEASE ORAL at 09:34

## 2023-12-11 RX ADMIN — LEVALBUTEROL HYDROCHLORIDE 1.25 MG: 1.25 SOLUTION RESPIRATORY (INHALATION) at 23:55

## 2023-12-11 RX ADMIN — SALINE NASAL SPRAY 2 SPRAY: 1.5 SOLUTION NASAL at 09:38

## 2023-12-11 RX ADMIN — MONTELUKAST 10 MG: 10 TABLET, FILM COATED ORAL at 21:08

## 2023-12-11 RX ADMIN — LEVALBUTEROL HYDROCHLORIDE 1.25 MG: 1.25 SOLUTION RESPIRATORY (INHALATION) at 06:18

## 2023-12-11 RX ADMIN — METHYLPREDNISOLONE SODIUM SUCCINATE 40 MG: 40 INJECTION INTRAMUSCULAR; INTRAVENOUS at 17:22

## 2023-12-11 RX ADMIN — REVEFENACIN 175 MCG: 175 SOLUTION RESPIRATORY (INHALATION) at 06:18

## 2023-12-11 RX ADMIN — ARFORMOTEROL TARTRATE 15 MCG: 15 SOLUTION RESPIRATORY (INHALATION) at 19:52

## 2023-12-11 RX ADMIN — METOPROLOL TARTRATE 50 MG: 50 TABLET, FILM COATED ORAL at 09:33

## 2023-12-11 RX ADMIN — HYDROCHLOROTHIAZIDE 25 MG: 25 TABLET ORAL at 12:11

## 2023-12-11 RX ADMIN — ATORVASTATIN CALCIUM 20 MG: 40 TABLET, FILM COATED ORAL at 21:09

## 2023-12-11 RX ADMIN — METOPROLOL TARTRATE 50 MG: 50 TABLET, FILM COATED ORAL at 21:09

## 2023-12-11 RX ADMIN — METHYLPREDNISOLONE SODIUM SUCCINATE 40 MG: 40 INJECTION INTRAMUSCULAR; INTRAVENOUS at 01:30

## 2023-12-11 RX ADMIN — LEVALBUTEROL HYDROCHLORIDE 1.25 MG: 1.25 SOLUTION RESPIRATORY (INHALATION) at 19:52

## 2023-12-11 RX ADMIN — ENOXAPARIN SODIUM 40 MG: 100 INJECTION SUBCUTANEOUS at 09:34

## 2023-12-11 RX ADMIN — LEVALBUTEROL HYDROCHLORIDE 1.25 MG: 1.25 SOLUTION RESPIRATORY (INHALATION) at 03:39

## 2023-12-11 RX ADMIN — ASPIRIN 81 MG: 81 TABLET, COATED ORAL at 21:09

## 2023-12-11 RX ADMIN — ISOSORBIDE MONONITRATE 60 MG: 60 TABLET, EXTENDED RELEASE ORAL at 09:33

## 2023-12-11 RX ADMIN — LEVALBUTEROL HYDROCHLORIDE 1.25 MG: 1.25 SOLUTION RESPIRATORY (INHALATION) at 13:52

## 2023-12-11 RX ADMIN — DOXYCYCLINE 100 MG: 100 CAPSULE ORAL at 09:32

## 2023-12-11 RX ADMIN — DOXYCYCLINE 100 MG: 100 CAPSULE ORAL at 21:08

## 2023-12-11 RX ADMIN — Medication 10 ML: at 21:09

## 2023-12-11 RX ADMIN — CLOPIDOGREL BISULFATE 75 MG: 75 TABLET ORAL at 09:33

## 2023-12-11 RX ADMIN — Medication 10 ML: at 09:39

## 2023-12-11 RX ADMIN — METHYLPREDNISOLONE SODIUM SUCCINATE 40 MG: 40 INJECTION INTRAMUSCULAR; INTRAVENOUS at 09:34

## 2023-12-11 NOTE — THERAPY EVALUATION
Patient Name: Shaneka Guido  : 1960    MRN: [id number]                              Today's Date: 2023       Admit Date: 2023    Visit Dx:     ICD-10-CM ICD-9-CM   1. COPD exacerbation  J44.1 491.21   2. Hypoxia  R09.02 799.02   3. Decreased activities of daily living (ADL)  Z78.9 V49.89     Patient Active Problem List   Diagnosis    Coronary artery disease of native artery of native heart with stable angina pectoris    PAD (peripheral artery disease)    Essential hypertension    Mixed hyperlipidemia    Obesity (BMI 30-39.9)    COPD exacerbation     Past Medical History:   Diagnosis Date    Anxiety disorder     Asthma     CAD (coronary artery disease)     anterior MI , BMS to LAD, then CABG x 4 (LIMA-LAD, Y SVG-OM1-OM2, SVG-rPDA)    Chronic bronchitis     Chronic constipation     Chronic diastolic (congestive) heart failure     COPD (chronic obstructive pulmonary disease)     Eczema of face     Esophageal stricture     Essential hypertension     Generalized headaches     GERD (gastroesophageal reflux disease)     Hemorrhoids     History of tobacco use     Hyperlipidemia     Incontinence     Leg edema, left     On anticoagulant therapy     Osteoarthritis     PAD (peripheral artery disease)     Panic attacks     PTSD (post-traumatic stress disorder)     per patient     Subclavian artery stenosis, left     BP should not be checked in that arm     Past Surgical History:   Procedure Laterality Date    ANGIOPLASTY      BRONCHOSCOPY      CARDIAC CATHETERIZATION N/A 2019    Procedure: Coronary angiography;  Surgeon: Nickie Zhu MD;  Location[facility] C[facility]VASIVE LOC[facility];  Service: Cardiovascular    CARDIAC CATHETERIZATION N/A 2019    Procedure: Left heart cath;  Surgeon: Nickie Zhu MD;  Location[facility];  Service: Cardiovascular    CARDIAC CATHETERIZATION N/A 2019    Procedure: Left ventriculography;  Surgeon: Nickie Zhu MD;  Location: [facility] CATH  INVASIVE LOCATION;  Service: Cardiovascular    CARDIAC CATHETERIZATION N/A 5/8/2019    Procedure: Bypass graft study;  Surgeon: Nickie Zhu MD;  Location: [facility] INVASIVE LOCATION;  Service: Cardiovascular    CHOLECYSTECTOMY      CORONARY ARTERY BYPASS GRAFT  2012    HYSTERECTOMY      LAPAROSCOPIC TUBAL LIGATION        General Information       Row Name 12/11/23 1605          OT Time and Intention    Document Type evaluation  -     Mode of Treatment individual therapy;occupational therapy  -       Row Name 12/11/23 1605          General Information    Patient Profile Reviewed yes  -     Prior Level of Function independent:;ADL's;driving;all household mobility  -     Existing Precautions/Restrictions no known precautions/restrictions;oxygen therapy device and L/min  2-3L NC  -     Barriers to Rehab none identified  -       Row Name 12/11/23 1602          Occupational Profile    Reason for Services/Referral (Occupational Profile) Patient is a 63 year old female admitted to [facility] on 12/8 with COPD exacerbation, found to be RSV+. OT consulted due to decreased ADL performance and for discharge planning. No previous OT consults for this condition.  -     Patient Goals (Occupational Profile) Get back to camping and hiking  -[facility] Name 12/11/23 1605          Living Environment    People in Home alone  pt states that she sponge bathes at home and will go to her sons house nearby to use their walk in shower chair with built in bench.  -       Row Name 12/11/23 1605          Cognition    Orientation Status (Cognition) oriented x 3  pleasantly engaged and well informed about her care  -[facility] Name 12/11/23 1605          Safety Issues, Functional Mobility    Impairments Affecting Function (Mobility) endurance/activity tolerance;shortness of breath  -               User Key  (r) = Recorded By, (t) = Taken By, (c) = Cosigned By      Initials Name Provider Type    DEBORAH De La Garza  KHADRA Daugherty Occupational Therapist                     Mobility/ADL's       Row Name 12/11/23 1607          Bed Mobility    Bed Mobility bed mobility (all) activities  -     All Activities, May (Bed Mobility) standby assist  -     Comment, (Bed Mobility) pt anxious about desat and shortness of breath with activity  -       Row Name 12/11/23 1607          Functional Mobility    Functional Mobility- Ind. Level contact guard assist;1 person  -       Row Name 12/11/23 1607          Activities of Daily Living    BADL Assessment/Intervention --  Pt requires CGA for ADLs, using bedside commode due to limited activity tolerance for OOB mobility at this time. Pt requires significantly increased time due to shortness of breath and perceived dyspnea on 2-3L NC. Titrate O2 to 3LPM with OOB activity  -               User Key  (r) = Recorded By, (t) = Taken By, (c) = Cosigned By      Initials Name Provider Type    Willow Mkceon OT Occupational Therapist                   Obj/Interventions       Row Name 12/11/23 1608          Sensory Assessment (Somatosensory)    Sensory Assessment (Somatosensory) UE sensation intact  -[facility] Name 12/11/23 1608          Vision Assessment/Intervention    Visual Impairment/Limitations WFL;corrective lenses for reading  -[facility] Name 12/11/23 1608          Range of Motion Comprehensive    General Range of Motion no range of motion deficits identified  -[facility] Name 12/11/23 1608          Balance    Balance Assessment sitting static balance;sitting dynamic balance;standing static balance;standing dynamic balance  -     Static Sitting Balance independent  -     Dynamic Sitting Balance standby assist  -     Position, Sitting Balance sitting edge of bed;unsupported  -     Static Standing Balance contact guard;1-person assist  -     Dynamic Standing Balance contact guard;1-person assist  -     Position/Device Used, Standing Balance supported  -                User Key  (r) = Recorded By, (t) = Taken By, (c) = Cosigned By      Initials Name Provider Type    Willow Mckeon, OT Occupational Therapist                   Goals/Plan       Row Name 12/11/23 1614          Bed Mobility Goal 1 (OT)    Activity/Assistive Device (Bed Mobility Goal 1, OT) bed mobility activities, all  -     [place] Level/Cues Needed (Bed Mobility Goal 1, OT) modified independence  -     Time Frame (Bed Mobility Goal 1, OT) long term goal (LTG);10 days  -       Row Name 12/11/23 1614          Transfer Goal 1 (OT)    Activity/Assistive Device (Transfer Goal 1, OT) transfers, all  -     [place] Level/Cues Needed (Transfer Goal 1, OT) independent  -     Time Frame (Transfer Goal 1, OT) long term goal (LTG);10 days  -[facility] Name 12/11/23 1614          Bathing Goal 1 (OT)    Activity/Device (Bathing Goal 1, OT) bathing skills, all  -     [place] Level/Cues Needed (Bathing Goal 1, OT) modified independence  -     Time Frame (Bathing Goal 1, OT) long term goal (LTG);10 days  -       Row Name 12/11/23 1614          Dressing Goal 1 (OT)    Activity/Device (Dressing Goal 1, OT) dressing skills, all  -     [place]/Cues Needed (Dressing Goal 1, OT) modified independence  -     Time Frame (Dressing Goal 1, OT) long term goal (LTG);10 days  -[facility] Name 12/11/23 1614          Toileting Goal 1 (OT)    Activity/Device (Toileting Goal 1, OT) toileting skills, all  -     [place] Level/Cues Needed (Toileting Goal 1, OT) modified independence  -     Time Frame (Toileting Goal 1, OT) long term goal (LTG);10 days  -       Row Name 12/11/23 1614          Grooming Goal 1 (OT)    Activity/Device (Grooming Goal 1, OT) grooming skills, all  -     [place] (Grooming Goal 1, OT) modified independence  -     Time Frame (Grooming Goal 1, OT) long term goal (LTG);10 days  -       Row Name 12/11/23 1614          Problem Specific Goal 1 (OT)    Problem  Specific Goal 1 (OT) Patient will demonstrate good functional endurance during BADL performance  -     Time Frame (Problem Specific Goal 1, OT) long term goal (LTG);10 days  -       Row Name 12/11/23 1614          Therapy Assessment/Plan (OT)    Planned Therapy Interventions (OT) activity tolerance training;BADL retraining;IADL retraining;patient/caregiver education/training;strengthening exercise;transfer/mobility retraining  -               User Key  (r) = Recorded By, (t) = Taken By, (c) = Cosigned By      Initials Name Provider Type    Willow Mckeon, KHADRA Occupational Therapist                   Clinical Impression       Row Name 12/11/23 1613 12/11/23 1609       Pain Assessment    Pretreatment Pain Rating 0/10 - no pain  - 0/10 - no pain  -    Posttreatment Pain Rating 0/10 - no pain  - 0/10 - no pain  -      Row Name 12/11/23 1613 12/11/23 1609       Plan of Care Review    Plan of Care Reviewed With patient  - patient  -    Progress no change  - no change  Initial OT evaluation  -    Outcome Evaluation -- Patient performance of self-care ADLs/mobility is limited by decreased functional endurance and shortness of breath requiring supplemental oxygen at this time. Pt has baseline anxiety surrounding shortness of breath due to COPD exacerbations and is a mouth breather which makes it difficult to recover on nasal cannula with activity. Pt will benefit from skilled OT intervention to progress towards goals. Pt did not use supplemental O2 at home.  -      Row Name 12/11/23 1609          Therapy Assessment/Plan (OT)    Patient/Family Therapy Goal Statement (OT) get back to camping and hiking  -     Rehab Potential (OT) good, to achieve stated therapy goals  -     Criteria for Skilled Therapeutic Interventions Met (OT) yes;meets criteria;skilled treatment is necessary  -     Therapy Frequency (OT) 5 times/wk  -     Predicted Duration of Therapy Intervention (OT) x 10 days  -        Row Name 12/11/23 1609          Therapy Plan Review/Discharge Plan (OT)    Anticipated Discharge Disposition (OT) home with assist  pt has excellent family support with multiple relatives in healthcare; can go stay with her son nearby as needed  -               User Key  (r) = Recorded By, (t) = Taken By, (c) = Cosigned By      Initials Name Provider Type    Willow Mckeon, KHADRA Occupational Therapist                   Outcome Measures       Row Name 12/11/23 1615          How much help from another is currently needed...    Putting on and taking off regular lower body clothing? 4  -JM     Bathing (including washing, rinsing, and drying) 3  -JM     Toileting (which includes using toilet bed pan or urinal) 4  -JM     Putting on and taking off regular upper body clothing 4  -JM     Taking care of personal grooming (such as brushing teeth) 4  -JM     Eating meals 4  -JM     AM-PAC 6 Clicks Score (OT) 23  -JM       Row Name 12/11/23 0715          How much help from another person do you currently need...    Turning from your back to your side while in flat bed without using bedrails? 4  -JW     Moving from lying on back to sitting on the side of a flat bed without bedrails? 4  -JW     Moving to and from a bed to a chair (including a wheelchair)? 3  -JW     Standing up from a chair using your arms (e.g., wheelchair, bedside chair)? 4  -JW     Climbing 3-5 steps with a railing? 3  -JW     To walk in hospital room? 3  -JW     AM-PAC 6 Clicks Score (PT) 21  -JW     Highest Level of Mobility Goal 6 --> Walk 10 steps or more  -       Row Name 12/11/23 1615          Functional Assessment    Outcome Measure Options AM-PAC 6 Clicks Daily Activity (OT);Optimal Instrument  -       Row Name 12/11/23 1615          Optimal Instrument    Optimal Instrument Optimal - 3  -JM     Bending/Stooping 3  shortness of breath  -JM     Standing 2  -JM     Reaching 1  -JM     From the list, choose the 3 activities you would most like to  be able to do without any difficulty Bending/stooping;Reaching;Standing  -     Total Score Optimal - 3 6  -               User Key  (r) = Recorded By, (t) = Taken By, (c) = Cosigned By      Initials Name Provider Type    Davina Ferrari, RN Registered Nurse    Willow Mckeon OT Occupational Therapist                    Occupational Therapy Education       Title: PT OT SLP Therapies (Done)       Topic: Occupational Therapy (Done)       Point: ADL training (Done)       Description:   Instruct learner(s) on proper safety adaptation and remediation techniques during self care or transfers.   Instruct in proper use of assistive devices.                  Learning Progress Summary             Patient Acceptance, E, VU by DEBORAH at 12/11/2023 1615                         Point: Home exercise program (Done)       Description:   Instruct learner(s) on appropriate technique for monitoring, assisting and/or progressing therapeutic exercises/activities.                  Learning Progress Summary             Patient Acceptance, E, VU by DEBORAH at 12/11/2023 1615                         Point: Precautions (Done)       Description:   Instruct learner(s) on prescribed precautions during self-care and functional transfers.                  Learning Progress Summary             Patient Acceptance, E, VU by DEBORAH at 12/11/2023 1615                         Point: Body mechanics (Done)       Description:   Instruct learner(s) on proper positioning and spine alignment during self-care, functional mobility activities and/or exercises.                  Learning Progress Summary             Patient Acceptance, E, VU by DEBORAH at 12/11/2023 1615                                         User Key       Initials Effective Dates Name Provider Type Shanda CABRERA 09/26/22 -  Willow De La Garza OT Occupational Therapist OT                  OT Recommendation and Plan  Planned Therapy Interventions (OT): activity tolerance training, BADL retraining,  IADL retraining, patient/caregiver education/training, strengthening exercise, transfer/mobility retraining  Therapy Frequency (OT): 5 times/wk  Plan of Care Review  Plan of Care Reviewed With: patient  Progress: no change  Outcome Evaluation: Patient performance of self-care ADLs/mobility is limited by decreased functional endurance and shortness of breath requiring supplemental oxygen at this time. Pt has baseline anxiety surrounding shortness of breath due to COPD exacerbations and is a mouth breather which makes it difficult to recover on nasal cannula with activity. Pt will benefit from skilled OT intervention to progress towards goals. Pt did not use supplemental O2 at home.     Time Calculation:   Evaluation Complexity (OT)  Review Occupational Profile/Medical/Therapy History Complexity: expanded/moderate complexity  Assessment, Occupational Performance/Identification of Deficit Complexity: 3-5 performance deficits  Clinical Decision Making Complexity (OT): detailed assessment/moderate complexity  Overall Complexity of Evaluation (OT): moderate complexity     Time Calculation- OT       Row Name 12/11/23 1616             Time Calculation- OT    OT Received On 12/11/23  -      OT Goal Re-Cert Due Date 12/20/23  -         Untimed Charges    OT Eval/Re-eval Minutes 80  -JM         Total Minutes    Untimed Charges Total Minutes 80  -JM       Total Minutes 80  -JM                User Key  (r) = Recorded By, (t) = Taken By, (c) = Cosigned By      Initials Name Provider Type    Willow Mckeon OT Occupational Therapist                  Therapy Charges for Today       Code Description Service Date Service Provider Modifiers Qty    [id number] HC-OT EVAL MOD COMPLEXITY 5 12/11/2023 Willow De La Garza OT  1                 Willow De La Garza OT  12/11/2023

## 2023-12-11 NOTE — PLAN OF CARE
Goal Outcome Evaluation:           Progress: no change  Outcome Evaluation: Patient A&0 x4. able to make needs known. Assist x1 with transfers. Purewick was in place due to urgency and soa. Patient moves alot in bed and purewick would not stay in place, purewick removed. Patient is cont and gets up to bedside commode. 02 sats 90-91% on 3L. Gets soa quickly and takes awhile to recovery.  medicated x1 with Tylenol. Call light within reach of patient. D/C plan: unsure at this time.

## 2023-12-11 NOTE — PLAN OF CARE
Goal Outcome Evaluation:  Plan of Care Reviewed With: patient        Progress: no change  Outcome Evaluation: Patient performance of self-care ADLs/mobility is limited by decreased functional endurance and shortness of breath requiring supplemental oxygen at this time. Pt has baseline anxiety surrounding shortness of breath due to COPD exacerbations and is a mouth breather which makes it difficult to recover on nasal cannula with activity. Pt will benefit from skilled OT intervention to progress towards goals. Pt did not use supplemental O2 at home.      Anticipated Discharge Disposition (OT): home with assist (pt has excellent family support with multiple relatives in healthcare; can go stay with her son nearby as needed)

## 2023-12-11 NOTE — PLAN OF CARE
Goal Outcome Evaluation:  Plan of Care Reviewed With: patient        Progress: no change  Outcome Evaluation: patient A/Ox4, VSS, voiding without difficulty, anxious, tele applied at md request due to positive sepsis screen. Recieving neb treatments around the clock pt tolerates them well. C/o difficulty breathing at times MD aware.

## 2023-12-11 NOTE — CASE MANAGEMENT/SOCIAL WORK
COPD Education    Referring Provider:  GARCÍA Busch  Reason for Consultation: NIPPV  Pulmonologist:  none currently; formerly [facility]  Last outpatient pulmonary visit:  2018 ([facility])    Age: 63 y.o.  Sex: female  BMI:Body mass index is 38.11 kg/m².     Past Medical Hx: coronary disease status post CABG, peripheral vascular disease status post multiple revascularizations, obesity BMI of 35, heart failure with preserved ejection fraction EF of 51 to 55% and grade 2 diastolic dysfunction, and left clavian stenosis     Smoking Hx: Social History    Tobacco Use      Smoking status: Former        Years: 45        Types: Cigarettes        Quit date: 2018        Years since quittin.4      Smokeless tobacco: Never    Social History     Substance and Sexual Activity   Drug Use No        Home Equipment Used:  NEB    ADL's: independent Lives:  home alone    Daily symptoms: SOA at rest w/exertion, nonproductive cough, wheeze, orthopnea, anxiety/panic attacks    Airway Clearance methods utilized: OPEP with acapella and aerobika    Have you ever attended Pulmonary Rehab?  no    Last PFT:   2018  PARAMETER BEST % PREDICTED   FVC L 2.10 68   FEV1 L 1.55 64   FEV1/FVC % 0.74      Have you ever had a sleep study/PSG? no    Last Arterial Blood Gas:   Lab Results   Component Value Date    PHART 7.369 12/10/2023    JBO7CRC 39.6 12/10/2023    PO2ART 67.7 (L) 12/10/2023    GPO9TEB 22.3 12/10/2023    BASEEXCESS -2.7 (L) 12/10/2023    E6CZLTNO 92.5 (L) 12/10/2023      Chest X-Ray findings: Stable evidence of previous CABG.  No active cardiopulmonary disease.     CAT Assessment: (COPD Assessment Test)  29  I never cough. 0[] 1[] 2[] 3[x] 4[] 5[] I cough all the time.  I have no phlegm (mucus) in my chest. 0[] 1[] 2[x] 3[] 4[] 5[] My chest is completely full of phlegm (mucus).  My chest does not feel tight at all. 0[] 1[] 2[] 3[x] 4[] 5[] My chest feels very tight.  When I walk up a hill or one flight of stairs  I am not breathless. 0[] 1[] 2[] 3[] 4[] 5[x] When I walk up a hill or one flight of stairs I am very breathless.  I am not limited doing any activities at home 0[] 1[] 2[] 3[] 4[x] 5[] I am very limited doing activities at home.  I am confident leaving my home despite my lung condition. 0[] 1[] 2[] 3[] 4[x] 5[] I am not at all confident leaving my home because of my lung condition.  I sleep soundly. 0[] 1[] 2[] 3[x] 4[] 5[] I don't sleep soundly because of my lung condition.  I have lots of energy. 0[] 1[] 2[] 3[] 4[] 5[x]  I have no energy at all.    CAT Score(COPD Assessment Test):   CAT Score Impact Level Management considerations   >30    >20 Very High    High In addition to guidance for low & medium impact scores consider  -Referral to specialist care  -Additional pharmacological treatments  -Referral to pulmonary rehabilitation   10-20 Medium In addition to guidance for low impact consider  -Maintenance therapy review  -Referral for pulmonary rehabilitation   <10 Low -Smoking cessation  -Annual vaccinations  -Reduced exposure to exacerbation risk factors          Home Medications:  Medications Prior to Admission   Medication Sig Dispense Refill Last Dose    aspirin 81 MG EC tablet Take 1 tablet by mouth Daily.   12/7/2023    atorvastatin (LIPITOR) 20 MG tablet Take 1 tablet by mouth Daily.   12/7/2023    clopidogrel (PLAVIX) 75 MG tablet Take 1 tablet by mouth Daily.  2 12/8/2023    furosemide (LASIX) 20 MG tablet Take 1 tablet by mouth Daily As Needed. as directed       hydroCHLOROthiazide (HYDRODIURIL) 25 MG tablet Take 1 tablet by mouth Daily. 90 tablet 3 12/8/2023    isosorbide mononitrate (IMDUR) 60 MG 24 hr tablet Take 1 tablet by mouth 2 (Two) Times a Day.   12/8/2023    levalbuterol (XOPENEX HFA) 45 MCG/ACT inhaler Inhale 1-2 puffs Every 4 (Four) Hours As Needed for Wheezing.       metoprolol tartrate (LOPRESSOR) 50 MG tablet Take 1 tablet by mouth 2 (Two) Times a Day. 180 tablet 0 12/8/2023     pantoprazole (PROTONIX) 40 MG EC tablet Take 1 tablet by mouth Daily.   12/8/2023    tiotropium bromide-olodaterol (STIOLTO RESPIMAT) 2.5-2.5 MCG/ACT aerosol solution inhaler Inhale 2 puffs Daily.   12/8/2023     Do you use a Spacer/Holding Chamber with your MDI?:  no    COPD disease process and management discussed with Ms. Guido. Margonent states she has quit following with her pulmonologists due to differences in opinion on care. Ms. Guido is currently using Stiolto respimat as maintenance and xopenex via inhaler or nebulizer up to 4 times daily.  Ms. Guido states her breathing/COPD has been well controlled for some time now but this exacerbation seems to be the worst ever.  Ms. Guido does mention she has a hx of black mold exposure/fungal pneumonia for which she completed itraconazole in 2018.  Ms. Guido was noted to have conversational dyspnea and was very anxious with any discussion of her COPD, stating she has anxiety attacks.  Patient does not have home O2; will need walking oximetry to qualify.  At this time, Ms. Guido does not have a qualifying ABG or Spirometry for NIPPV with bilevel or NIV.  Bedside spirometry will need to be completed to qualify patient.    Angela Dominguez, RRT   RT   12/11/23  14:12 EST

## 2023-12-11 NOTE — CONSULTS
Pulmonary / Critical Care Consult Note      Patient Name: Shaneka Guido  : 1960  MRN: [id number]  Primary Care Physician:  Andrea Salazar MD  Referring Physician: No ref. provider found  Date of admission: 2023    Subjective   Subjective     Reason for Consult/ Chief Complaint: RSV pneumonia, COPD with acute exacerbation    HPI:  Shaneka Guido is a 63 y.o. female with history of coronary artery disease status post CABG, peripheral vascular disease status post multiple revascularizations, obesity with BMI of 35, heart failure with preserved ejection fraction, EF of 51 to 55% and grade 2 diastolic dysfunction, left subclavian stenosis who presents with worsening shortness of breath for 2 to 3 days.  She had fever up to 101 °F at home.  In the ER, chest x-ray showed no acute abnormality.  Patient was having increased work of breathing and wheezing.  She was given methylprednisolone and was started on breathing treatments.  Later on in the hospitalization, patient was found to be RSV positive.  She is being treated for asthma pneumonia and COPD with acute exacerbation.  Pulmonary and critical services consulted for extensive management of her acute issues.  She has a history of chronic heavy smoking in past.  She quit smoking 6 years ago, prior to that used to smoke 1-1/2 pack a day for more than 40 years.  She used to see outside pulmonology service in [place], was on Stiolto and as needed albuterol at home.  She continues to have wheezing and chest tightness.  Currently, patient is on Brovana, Yupelri and Solu-Medrol 40 mg every 8 hours.  Patient has a history of recurrent bronchitis, requiring multiple rounds of antibiotics and steroids in the past.      Review of Systems  General:  Fatigue, chills and fever.   HEENT: No dysphagia, No Visual Changes, no rhinorrhea  Respiratory:  + Productive cough,+Dyspnea, intermittent mucoid phlegm, No Pleuritic Pain, ++ wheezing, no  hemoptysis  Cardiovascular: Denies chest pain, denies palpitations,+RUBIO, + Chest Pressure  Gastrointestinal:  No Abdominal Pain, No Nausea, No Vomiting, No Diarrhea  Genitourinary:  No Dysuria, No Frequency, No Hesitancy  Musculoskeletal: No muscle pain or swelling  Endocrine:  No Heat Intolerance, No Cold Intolerance  Hematologic:  No Bleeding, No Bruising  Psychiatric:  No Anxiety, No Depression  Neurologic:  No Confusion, no Dysarthria, No Headaches  Skin:  No Rash, No Open Wounds        Personal History     Past Medical History:   Diagnosis Date   • Anxiety disorder    • Asthma    • CAD (coronary artery disease)     anterior MI 2012, BMS to LAD, then CABG x 4 (LIMA-LAD, Y SVG-OM1-OM2, SVG-rPDA)   • Chronic bronchitis    • Chronic constipation    • Chronic diastolic (congestive) heart failure    • COPD (chronic obstructive pulmonary disease)    • Eczema of face    • Esophageal stricture    • Essential hypertension    • Generalized headaches    • GERD (gastroesophageal reflux disease)    • Hemorrhoids    • History of tobacco use    • Hyperlipidemia    • Incontinence    • Leg edema, left    • On anticoagulant therapy    • Osteoarthritis    • PAD (peripheral artery disease)    • Panic attacks    • PTSD (post-traumatic stress disorder)     per patient    • Subclavian artery stenosis, left     BP should not be checked in that arm       Past Surgical History:   Procedure Laterality Date   • ANGIOPLASTY     • BRONCHOSCOPY     • CARDIAC CATHETERIZATION N/A 5/8/2019    Procedure: Coronary angiography;  Surgeon: Nickie Zhu MD;  Location:  [facility] CATH INVASIVE LOCATION;  Service: Cardiovascular   • CARDIAC CATHETERIZATION N/A 5/8/2019    Procedure: Left heart cath;  Surgeon: Nickie Zhu MD;  Location: [facility] CATH INVASIVE LOCATION;  Service: Cardiovascular   • CARDIAC CATHETERIZATION N/A 5/8/2019    Procedure: Left ventriculography;  Surgeon: Nickie Zhu MD;  Location:  [facility] CATH INVASIVE LOCATION;  Service:  Cardiovascular   • CARDIAC CATHETERIZATION N/A 5/8/2019    Procedure: Bypass graft study;  Surgeon: Nickie Zhu MD;  Location: [facility] INVASIVE LOC[facility];  Service: Cardiovascular   • CHOLECYSTECTOMY     • CORONARY ARTERY BYPASS GRAFT  2012   • HYSTERECTOMY     • LAPAROSCOPIC TUBAL LIGATION         Family History: family history includes Asthma in her daughter; Cancer in her father; Chronic bronchitis in her mother and son; Diabetes in her father; Heart disease in her father and mother; Hypertension in her daughter, mother, sister, and son; Osteoporosis in her daughter, mother, and sister.     Social History:  reports that she quit smoking about 5 years ago. Her smoking use included cigarettes. She has never used smokeless tobacco. She reports that she does not drink alcohol and does not use drugs.    Home Medications:  aspirin, atorvastatin, clopidogrel, furosemide, hydroCHLOROthiazide, isosorbide mononitrate, levalbuterol, metoprolol tartrate, pantoprazole, and tiotropium bromide-olodaterol    Allergies:  Allergies   Allergen Reactions   • Albuterol Other (See Comments)     Mouth blisters, throat swells   • Amlodipine Swelling   • Latex Other (See Comments)     Severe blistering, throat swells   • Penicillins Other (See Comments)     Throat swells   • Prednisone Other (See Comments)     Fluid retention.    • Influenza Vaccines Rash       Objective    Objective     Vitals:   Temp:  [97.4 °F (36.3 °C)-97.7 °F (36.5 °C)] 97.4 °F (36.3 °C)  Heart Rate:  [] 84  Resp:  [18-20] 20  BP: (119-189)/(61-85) 189/85  Flow (L/min):  [3] 3    Physical Exam:  Vital Signs Reviewed   General:  WDWN, Alert, in mild distress, has conversational dyspnea  HEENT:  PERRL, EOMI.  OP, nares clear, no sinus tenderness  Neck:  Supple, no JVD, no thyromegaly  Lymph: no axillary, cervical, supraclavicular lymphadenopathy noted bilaterally  Chest: Poor aeration, bilateral and expiratory wheezing, no crackles, no rhonchi, resonant  percussion bilaterally   CV: RRR, no MGR, pulses 2+, equal.  Abd:  Soft, NT, ND, + BS, no HSM  EXT:  no clubbing, no cyanosis, no edema, no joint tenderness  Neuro:  A&Ox3, CN grossly intact, no focal deficits.  Skin: No rashes or lesions noted      Result Review    Result Review:  I have personally reviewed the results from the time of this admission to 12/11/2023 15:16 EST and agree with these findings:  [x]  Laboratory  [x]  Microbiology  [x]  Radiology  [x]  EKG/Telemetry   [x]  Cardiology/Vascular   []  Pathology  []  Old records  []  Other:  Most notable findings include:         Lab 12/11/23  0401 12/10/23  0453 12/09/23  0624 12/08/23  1452   WBC 6.74 9.98 4.49 6.42   HEMOGLOBIN 14.4 14.5 13.8 14.5   HEMATOCRIT 45.0 45.2 42.7 44.1   PLATELETS 280 304 259 292   SODIUM 137 134* 133* 132*   POTASSIUM 4.0 3.6 3.3* 3.3*   CHLORIDE 97* 95* 95* 94*   CO2 25.9 21.4* 22.9 24.8   BUN 32* 37* 21 17   CREATININE 1.11* 1.19* 1.26* 1.17*   GLUCOSE 108* 101* 148* 105*   CALCIUM 9.7 9.7 9.6 9.5   PHOSPHORUS 4.8* 3.7  --   --    TOTAL PROTEIN  --   --  8.2 8.4   ALBUMIN 4.1 4.1 3.8 4.1   GLOBULIN  --   --  4.4 4.3     XR Chest 1 View    Result Date: 12/10/2023  PROCEDURE: XR CHEST 1 VW  COMPARISON: [facility], CR, XR CHEST 1 VW, 12/08/2023, 15:10.  INDICATIONS: soa  FINDINGS:  The lungs are clear for stable incidental calcified granuloma left midlung. Cardiac, hilar, and mediastinal silhouettes are stable with evidence of previous CABG.  No pneumothorax or pleural effusion. Bony structures are intact.  The trachea is midline.          Impression:  Stable evidence of previous CABG.  No active cardiopulmonary disease.       MAILE LEWIS DO       Electronically Signed and Approved By: MAILE LEWIS DO on 12/10/2023 at 9:02                 Patient was positive for RSV.    Assessment & Plan   Assessment / Plan     Active Hospital Problems:  Active Hospital Problems    Diagnosis    • **COPD exacerbation           Impression:  RSV pneumonia  COPD with acute exacerbation  History of smoking in past  Diastolic heart failure, not in acute exacerbation  History of recurrent bronchitis    Plan:  Continue with Brovana and Yupelri.  Continue Solu-Medrol 40 mg every 8 hours IV.  Check CT scan of the chest without contrast.  Check serum IgE level.  Start Zyrtec-D and montelukast.  Continue supplemental oxygen.  Rest depending on clinical course.    Reviewed labs, imaging and provider notes.  Discussed with bedside nurse and primary service.  Thank you for allowing me to participate in care of Ms. Guido.  Will follow along.    Electronically signed by Sedrick Silvestre MD, 12/11/2023, 15:16 EST.

## 2023-12-11 NOTE — PROGRESS NOTES
[facility]   Hospitalist Progress Note  Date: 2023  Patient Name: Shaneka Guido  : 1960  MRN: [id number]  Date of admission: 2023  Consultants:  -Pulmonology: Dr. Sedrick Silvestre    Subjective   Subjective     Chief Complaint: Shortness of breath    Summary:   Shaneka Guido is a 63 y.o. female with CAD s/p CABG, peripheral vascular disease s/p multiple revascularizations, chronic diastolic heart failure, COPD with previous smoking history presented to ED with shortness of breath.  CXR in ED negative however patient remained symptomatic.  Hospitalist service contacted for further evaluation management.  Antibiotics, steroids and nebulizer treatment started.  RSV testing returned positive.  Due to patient's continued shortness of breath pulmonology consulted.    Interval Followup:   Patient still with shortness of breath.  Denies any chest pain.  Also endorsed high level anxiety.  Nursing with no additional acute issues to report.    Review of Systems   All systems reviewed and negative unless stated otherwise under subjective.    Objective   Objective     Vitals:   Temp:  [97.4 °F (36.3 °C)-97.7 °F (36.5 °C)] 97.4 °F (36.3 °C)  Heart Rate:  [] 84  Resp:  [18-22] 20  BP: (119-189)/(61-85) 189/85  Flow (L/min):  [3] 3  Physical Exam   Gen: Appears uncomfortable, sitting up in bed, anxious  Resp: Expiratory wheezing noted bilaterally, equal chest is bilaterally, conversational dyspnea noted  Card: RRR, No m/r/g  Abd: Soft, Nontender, Nondistended, + bowel sounds    Result Review    Result Review:  I have personally reviewed the results as below and agree with these findings:  []  Laboratory:   CMP          2023    06:24 12/10/2023    04:53 2023    04:01   CMP   Glucose 148  101  108    BUN 21  37  32    Creatinine 1.26  1.19  1.11    EGFR 48.4  51.8  56.3    Sodium 133  134  137    Potassium 3.3  3.6  4.0    Chloride 95  95  97    Calcium 9.6  9.7  9.7    Total Protein 8.2       Albumin 3.8  4.1  4.1    Globulin 4.4      Total Bilirubin 0.4      Alkaline Phosphatase 88      AST (SGOT) 25      ALT (SGPT) 19      Albumin/Globulin Ratio 0.9      BUN/Creatinine Ratio 16.7  31.1  28.8    Anion Gap 15.1  17.6  14.1      CBC          12/9/2023    06:24 12/10/2023    04:53 12/11/2023    04:01   CBC   WBC 4.49  9.98  6.74    RBC 5.19  5.39  5.34    Hemoglobin 13.8  14.5  14.4    Hematocrit 42.7  45.2  45.0    MCV 82.3  83.9  84.3    MCH 26.6  26.9  27.0    MCHC 32.3  32.1  32.0    RDW 14.6  14.6  14.6    Platelets 259  304  280    Phosphorus slightly elevated.  [x]  Microbiology: Sputum culture (12/09/2023): Normal respiratory darrel.  []  Radiology:   [x]  EKG/Telemetry:    []  Cardiology/Vascular:    []  Pathology:  []  Old records:  []  Other:    Assessment & Plan   Assessment / Plan     Assessment:  Acute hypoxic respiratory failure  Acute exacerbation of COPD  RSV infection  CAD  Peripheral arterial disease  Essential hypertension  Hyperlipidemia    Plan:  -Given patient's persistent complaints of shortness of breath and expiratory wheezing CT chest ordered  -Pulmonology consulted to assist in care  -Continue supplemental O2 to maintain sats greater than 90%, wean as tolerated  -Continue Brovana, Xopenex and Yupelri  -Continue Solu-Medrol  -Continue as needed Xanax  -Continue appropriate home medications  -Hold HCTZ and Lasix  -Bronchopulmonary hygiene protocol and bronchodilator protocols ordered  -PT/OT consulted  -Will monitor electrolytes and renal function with BMP and magnesium level in the AM  -Will monitor WBC and Hgb with CBC in the AM  -Clinical course will dictate further management     DVT Prophylaxis: Lovenox  GI Prophylaxis: Pantoprazole  Diet: Cardiac/diabetic  Dispo: Home when medically appropriate for discharge  Code Status: Full code     Time spent personally reviewing patient's chart, labs and imaging, evaluating/examining the patient, discussing care plan with patient and  nurse at bedside and discussing management with consultants (Pulmonology): 51 minutes.     Part of this note may be an electronic transcription/translation of spoken language to printed text using the Dragon dictation system.    DVT prophylaxis:  Medical DVT prophylaxis orders are present.    CODE STATUS:   Level Of Support Discussed With: Patient  Code Status (Patient has no pulse and is not breathing): CPR (Attempt to Resuscitate)  Medical Interventions (Patient has pulse or is breathing): Full Support        Electronically signed by Silvio Hogue MD, 12/11/23, 2:54 PM EST.

## 2023-12-11 NOTE — PLAN OF CARE
Goal Outcome Evaluation:  Plan of Care Reviewed With: patient        Progress: no change  Outcome Evaluation: Vital signs stable. Patient transferred from [address] [place] [address]. Patient feels short of breath but oxygen level good on 3L/NC, gets anxious. See discharge care plans for further details.

## 2023-12-12 ENCOUNTER — APPOINTMENT (OUTPATIENT)
Dept: CT IMAGING | Facility: HOSPITAL | Age: 63
End: 2023-12-12
Payer: COMMERCIAL

## 2023-12-12 LAB
ALBUMIN SERPL-MCNC: 3.5 G/DL (ref 3.5–5.2)
ANION GAP SERPL CALCULATED.3IONS-SCNC: 10.4 MMOL/L (ref 5–15)
BASOPHILS # BLD AUTO: 0.02 10*3/MM3 (ref 0–0.2)
BASOPHILS NFR BLD AUTO: 0.4 % (ref 0–1.5)
BUN SERPL-MCNC: 36 MG/DL (ref 8–23)
BUN/CREAT SERPL: 36 (ref 7–25)
CALCIUM SPEC-SCNC: 9.3 MG/DL (ref 8.6–10.5)
CHLORIDE SERPL-SCNC: 100 MMOL/L (ref 98–107)
CO2 SERPL-SCNC: 25.6 MMOL/L (ref 22–29)
CREAT SERPL-MCNC: 1 MG/DL (ref 0.57–1)
CRP SERPL-MCNC: 1.35 MG/DL (ref 0–0.5)
DEPRECATED RDW RBC AUTO: 46.9 FL (ref 37–54)
EGFRCR SERPLBLD CKD-EPI 2021: 63.8 ML/MIN/1.73
EOSINOPHIL # BLD AUTO: 0 10*3/MM3 (ref 0–0.4)
EOSINOPHIL NFR BLD AUTO: 0 % (ref 0.3–6.2)
ERYTHROCYTE [DISTWIDTH] IN BLOOD BY AUTOMATED COUNT: 14.7 % (ref 12.3–15.4)
GLUCOSE SERPL-MCNC: 107 MG/DL (ref 65–99)
HCT VFR BLD AUTO: 44.3 % (ref 34–46.6)
HGB BLD-MCNC: 13.9 G/DL (ref 12–15.9)
IMM GRANULOCYTES # BLD AUTO: 0.01 10*3/MM3 (ref 0–0.05)
IMM GRANULOCYTES NFR BLD AUTO: 0.2 % (ref 0–0.5)
LYMPHOCYTES # BLD AUTO: 0.59 10*3/MM3 (ref 0.7–3.1)
LYMPHOCYTES NFR BLD AUTO: 12.5 % (ref 19.6–45.3)
MAGNESIUM SERPL-MCNC: 2.5 MG/DL (ref 1.6–2.4)
MCH RBC QN AUTO: 27.3 PG (ref 26.6–33)
MCHC RBC AUTO-ENTMCNC: 31.4 G/DL (ref 31.5–35.7)
MCV RBC AUTO: 87 FL (ref 79–97)
MONOCYTES # BLD AUTO: 0.4 10*3/MM3 (ref 0.1–0.9)
MONOCYTES NFR BLD AUTO: 8.5 % (ref 5–12)
NEUTROPHILS NFR BLD AUTO: 3.71 10*3/MM3 (ref 1.7–7)
NEUTROPHILS NFR BLD AUTO: 78.4 % (ref 42.7–76)
NRBC BLD AUTO-RTO: 0 /100 WBC (ref 0–0.2)
PHOSPHATE SERPL-MCNC: 2.9 MG/DL (ref 2.5–4.5)
PLATELET # BLD AUTO: 212 10*3/MM3 (ref 140–450)
PMV BLD AUTO: 9.6 FL (ref 6–12)
POTASSIUM SERPL-SCNC: 4.3 MMOL/L (ref 3.5–5.2)
RBC # BLD AUTO: 5.09 10*6/MM3 (ref 3.77–5.28)
SODIUM SERPL-SCNC: 136 MMOL/L (ref 136–145)
WBC NRBC COR # BLD AUTO: 4.73 10*3/MM3 (ref 3.4–10.8)

## 2023-12-12 PROCEDURE — 94799 UNLISTED PULMONARY SVC/PX: CPT

## 2023-12-12 PROCEDURE — 71250 CT THORAX DX C-: CPT

## 2023-12-12 PROCEDURE — 99233 SBSQ HOSP IP/OBS HIGH 50: CPT | Performed by: INTERNAL MEDICINE

## 2023-12-12 PROCEDURE — 25010000002 ENOXAPARIN PER 10 MG: Performed by: INTERNAL MEDICINE

## 2023-12-12 PROCEDURE — 97161 PT EVAL LOW COMPLEX 20 MIN: CPT

## 2023-12-12 PROCEDURE — 86140 C-REACTIVE PROTEIN: CPT | Performed by: INTERNAL MEDICINE

## 2023-12-12 PROCEDURE — 85025 COMPLETE CBC W/AUTO DIFF WBC: CPT | Performed by: INTERNAL MEDICINE

## 2023-12-12 PROCEDURE — 94664 DEMO&/EVAL PT USE INHALER: CPT

## 2023-12-12 PROCEDURE — 25010000002 METHYLPREDNISOLONE PER 40 MG: Performed by: PHYSICIAN ASSISTANT

## 2023-12-12 PROCEDURE — 63710000001 REVEFENACIN 175 MCG/3ML SOLUTION: Performed by: PHYSICIAN ASSISTANT

## 2023-12-12 PROCEDURE — 83735 ASSAY OF MAGNESIUM: CPT | Performed by: INTERNAL MEDICINE

## 2023-12-12 PROCEDURE — 80069 RENAL FUNCTION PANEL: CPT | Performed by: INTERNAL MEDICINE

## 2023-12-12 RX ORDER — HYDROXYZINE HYDROCHLORIDE 25 MG/1
25 TABLET, FILM COATED ORAL 3 TIMES DAILY
Status: DISCONTINUED | OUTPATIENT
Start: 2023-12-12 | End: 2023-12-14 | Stop reason: HOSPADM

## 2023-12-12 RX ADMIN — HYDROCHLOROTHIAZIDE 25 MG: 25 TABLET ORAL at 08:40

## 2023-12-12 RX ADMIN — LEVALBUTEROL HYDROCHLORIDE 1.25 MG: 1.25 SOLUTION RESPIRATORY (INHALATION) at 06:28

## 2023-12-12 RX ADMIN — ARFORMOTEROL TARTRATE 15 MCG: 15 SOLUTION RESPIRATORY (INHALATION) at 06:28

## 2023-12-12 RX ADMIN — MONTELUKAST 10 MG: 10 TABLET, FILM COATED ORAL at 20:40

## 2023-12-12 RX ADMIN — CETIRIZINE HYDROCHLORIDE, PSEUDOEPHEDRINE HYDROCHLORIDE 1 TABLET: 5; 120 TABLET, FILM COATED, EXTENDED RELEASE ORAL at 20:40

## 2023-12-12 RX ADMIN — CETIRIZINE HYDROCHLORIDE, PSEUDOEPHEDRINE HYDROCHLORIDE 1 TABLET: 5; 120 TABLET, FILM COATED, EXTENDED RELEASE ORAL at 08:40

## 2023-12-12 RX ADMIN — ASPIRIN 81 MG: 81 TABLET, COATED ORAL at 20:40

## 2023-12-12 RX ADMIN — DOXYCYCLINE 100 MG: 100 CAPSULE ORAL at 08:40

## 2023-12-12 RX ADMIN — LEVALBUTEROL HYDROCHLORIDE 1.25 MG: 1.25 SOLUTION RESPIRATORY (INHALATION) at 19:44

## 2023-12-12 RX ADMIN — METHYLPREDNISOLONE SODIUM SUCCINATE 40 MG: 40 INJECTION INTRAMUSCULAR; INTRAVENOUS at 08:39

## 2023-12-12 RX ADMIN — DOXYCYCLINE 100 MG: 100 CAPSULE ORAL at 20:40

## 2023-12-12 RX ADMIN — ARFORMOTEROL TARTRATE 15 MCG: 15 SOLUTION RESPIRATORY (INHALATION) at 19:44

## 2023-12-12 RX ADMIN — METHYLPREDNISOLONE SODIUM SUCCINATE 40 MG: 40 INJECTION INTRAMUSCULAR; INTRAVENOUS at 18:13

## 2023-12-12 RX ADMIN — HYDROXYZINE HYDROCHLORIDE 25 MG: 25 TABLET, FILM COATED ORAL at 20:40

## 2023-12-12 RX ADMIN — METOPROLOL TARTRATE 50 MG: 50 TABLET, FILM COATED ORAL at 20:40

## 2023-12-12 RX ADMIN — ATORVASTATIN CALCIUM 20 MG: 40 TABLET, FILM COATED ORAL at 20:40

## 2023-12-12 RX ADMIN — CLOPIDOGREL BISULFATE 75 MG: 75 TABLET ORAL at 08:40

## 2023-12-12 RX ADMIN — ENOXAPARIN SODIUM 40 MG: 100 INJECTION SUBCUTANEOUS at 08:40

## 2023-12-12 RX ADMIN — HYDROXYZINE HYDROCHLORIDE 25 MG: 25 TABLET, FILM COATED ORAL at 11:20

## 2023-12-12 RX ADMIN — Medication 10 ML: at 08:39

## 2023-12-12 RX ADMIN — ISOSORBIDE MONONITRATE 60 MG: 60 TABLET, EXTENDED RELEASE ORAL at 08:40

## 2023-12-12 RX ADMIN — LEVALBUTEROL HYDROCHLORIDE 1.25 MG: 1.25 SOLUTION RESPIRATORY (INHALATION) at 12:00

## 2023-12-12 RX ADMIN — PANTOPRAZOLE SODIUM 40 MG: 40 TABLET, DELAYED RELEASE ORAL at 08:40

## 2023-12-12 RX ADMIN — REVEFENACIN 175 MCG: 175 SOLUTION RESPIRATORY (INHALATION) at 06:28

## 2023-12-12 RX ADMIN — METHYLPREDNISOLONE SODIUM SUCCINATE 40 MG: 40 INJECTION INTRAMUSCULAR; INTRAVENOUS at 02:02

## 2023-12-12 RX ADMIN — ALPRAZOLAM 0.25 MG: 0.25 TABLET ORAL at 12:43

## 2023-12-12 RX ADMIN — Medication 10 ML: at 20:39

## 2023-12-12 RX ADMIN — ISOSORBIDE MONONITRATE 60 MG: 60 TABLET, EXTENDED RELEASE ORAL at 20:40

## 2023-12-12 RX ADMIN — METOPROLOL TARTRATE 50 MG: 50 TABLET, FILM COATED ORAL at 08:40

## 2023-12-12 NOTE — PLAN OF CARE
"Goal Outcome Evaluation:  Plan of Care Reviewed With: patient        Progress: no change       PATIENT ANXIOUS AT START OF SHIFT, XANAX GIVEN, RT PLACED PT ON VENTURI AND SHE FELT AS IF SHE COULD BREATHE MUCH BETTER. DID NOT WANT TO ATTEMPT CT LAST NIGHT STATED \"I JUST NEED SLEEP TONIGHT\" PATIENT TO ATTEMPT TODAY IF FEELING BETTER. VSS NO ACUTE EVENTS     "

## 2023-12-12 NOTE — PROGRESS NOTES
[facility]   Hospitalist Progress Note  Date: 2023  Patient Name: Shaneka Guido  : 1960  MRN: [id number]  Date of admission: 2023  Consultants:  -Pulmonology: Dr. Sedrick Silvestre    Subjective   Subjective     Chief Complaint: Shortness of breath    Summary:   Shaneka Guido is a 63 y.o. female with CAD s/p CABG, peripheral vascular disease s/p multiple revascularizations, chronic diastolic heart failure, COPD with previous smoking history presented to ED with shortness of breath.  CXR in ED negative however patient remained symptomatic.  Hospitalist service contacted for further evaluation management.  Antibiotics, steroids and nebulizer treatment started.  RSV testing returned positive.  Due to patient's continued shortness of breath pulmonology consulted.    Interval Followup:   No acute events overnight.  Patient still with some complaints of shortness of breath.  Patient due to anxiety level was not able to have CT scan done due to anxiety level.  Patient tolerating Brovana and Yupelri.  Continues on Solu-Medrol.  Patient is currently using full facemask, she says that it helps her with her breathing.  Nursing with no additional acute issues to report.    Review of Systems   All systems reviewed and negative unless stated otherwise under subjective.    Objective   Objective     Vitals:   Temp:  [98 °F (36.7 °C)-98.1 °F (36.7 °C)] 98 °F (36.7 °C)  Heart Rate:  [] 79  Resp:  [18-30] 18  BP: (131-182)/(47-83) 163/76  Flow (L/min):  [3-6] 6  Physical Exam   Gen: Appears to be in mild distress, sitting up in bed  Resp: Conversational dyspnea appreciated, diminished breath sounds bilaterally, poor aeration, scattered expiratory wheezing noted  Card: RRR, No m/r/g  Abd: Soft, Nontender, Nondistended, + bowel sounds    Result Review    Result Review:  I have personally reviewed the results as below and agree with these findings:  []  Laboratory:   CMP          12/10/2023    04:53  12/11/2023    04:01 12/12/2023    05:36   CMP   Glucose 101  108  107    BUN 37  32  36    Creatinine 1.19  1.11  1.00    EGFR 51.8  56.3  63.8    Sodium 134  137  136    Potassium 3.6  4.0  4.3    Chloride 95  97  100    Calcium 9.7  9.7  9.3    Albumin 4.1  4.1  3.5    BUN/Creatinine Ratio 31.1  28.8  36.0    Anion Gap 17.6  14.1  10.4      CBC          12/10/2023    04:53 12/11/2023    04:01 12/12/2023    05:36   CBC   WBC 9.98  6.74  4.73    RBC 5.39  5.34  5.09    Hemoglobin 14.5  14.4  13.9    Hematocrit 45.2  45.0  44.3    MCV 83.9  84.3  87.0    MCH 26.9  27.0  27.3    MCHC 32.1  32.0  31.4    RDW 14.6  14.6  14.7    Platelets 304  280  212    Phosphorus and magnesium within normal limits.  []  Microbiology:  []  Radiology:   [x]  EKG/Telemetry:    []  Cardiology/Vascular:    []  Pathology:  []  Old records:  []  Other:    Assessment & Plan   Assessment / Plan     Assessment:  Acute hypoxic respiratory failure  Acute exacerbation of COPD  RSV infection  CAD  Peripheral arterial disease  Essential hypertension  Hyperlipidemia    Plan:  -Pulmonology consulted and following, appreciate assistance and recommendations in the care of this patient.  -Continue supplemental O2 to maintain sats greater than 90%, wean as tolerated  -Discussed CT chest with the patient.  Patient is agreeable to having imaging done today.  -Continue Brovana, Yupelri and Xopenex  -Continue Solu-Medrol  -Continue as needed Xanax  -Continue HCTZ  -Hydroxyzine available as needed for anxiety  -Continue bronchopulmonary hygiene protocol and bronchodilator protocol  -PT/OT consulted  -Management discussed with pulmonology.  Agree with obtaining CT scan of the chest for further evaluation.  -Monitor electrolytes and renal function with BMP and magnesium level in the AM  -Monitor WBC and Hgb with CBC in the AM  -Clinical course will dictate further management     DVT Prophylaxis: Lovenox  GI Prophylaxis: Pantoprazole  Diet:  Cardiac/diabetic  Dispo: Home when medically appropriate for discharge  Code Status: Full code     Time spent personally reviewing patient's chart, labs and imaging, evaluating/examining the patient, discussing care plan with patient and nurse at bedside and discussing management with consultants (Pulmonology): 50 minutes.     Part of this note may be an electronic transcription/translation of spoken language to printed text using the Dragon dictation system.    DVT prophylaxis:  Medical DVT prophylaxis orders are present.    CODE STATUS:   Level Of Support Discussed With: Patient  Code Status (Patient has no pulse and is not breathing): CPR (Attempt to Resuscitate)  Medical Interventions (Patient has pulse or is breathing): Full Support      Electronically signed by Silvio Hogue MD, 12/12/23, 4:45 PM EST.

## 2023-12-12 NOTE — PROGRESS NOTES
Respiratory Therapist Broncho-Pulmonary Hygiene Progress Note      Patient Name:  Shaneka Guido  YOB: 1960    Shaneka Guido meets the qualification for Level 1 of the Bronco-Pulmonary Hygiene Protocol. This was based on my daily patient assessment and includes review of chest x-ray results, cough ability and quality, oxygenation, secretions or risk for secretion development and patient mobility.     Broncho-Pulmonary Hygiene Assessment:    Level of Movement: Actively changing positions-requires assistance  Disoriented/Follows Commands    Breath Sounds: Diminished and/or coarse rhonchi    Cough: Strong, effective    Chest X-Ray: Possible signs of consolidation and/or atelectasis or clear.     Sputum Productions: None or small amount of thin or watery secretions with effective cough    History and Physical: None    SpO2 to Oxygen Need: greater than 92% on 4-6L nasal canula    Current SpO2 is: 93 on 6L    Based on this information, I have completed the following interventions: Teach/Instruct patient on cough and deep breathe      Electronically signed by Cris Ríos RRT, 12/12/23, 12:04 PM EST.

## 2023-12-12 NOTE — CONSULTS
"Nutrition Services    Patient Name: Shaneka Guido  YOB: 1960  MRN: [id number]  Admission date: 12/8/2023      CLINICAL NUTRITION ASSESSMENT      Reason for Assessment  MST Score 2+     H&P:  Past Medical History:   Diagnosis Date    Anxiety disorder     Asthma     CAD (coronary artery disease)     anterior MI 2012, BMS to LAD, then CABG x 4 (LIMA-LAD, Y SVG-OM1-OM2, SVG-rPDA)    Chronic bronchitis     Chronic constipation     Chronic diastolic (congestive) heart failure     COPD (chronic obstructive pulmonary disease)     Eczema of face     Esophageal stricture     Essential hypertension     Generalized headaches     GERD (gastroesophageal reflux disease)     Hemorrhoids     History of tobacco use     Hyperlipidemia     Incontinence     Leg edema, left     On anticoagulant therapy     Osteoarthritis     PAD (peripheral artery disease)     Panic attacks     PTSD (post-traumatic stress disorder)     per patient     Subclavian artery stenosis, left     BP should not be checked in that arm        Current Problems:   Active Hospital Problems    Diagnosis     **COPD exacerbation         Nutrition/Diet History         Narrative   63 year old female admitted to hospital with RSV pneumonia, exacerbations of COPD and diastolic heart failure.      Nursing notes no skin impairments.  Farhan score  20, (19-23, no risk for breakdown).    Last documented BM, 12/10/2023.    C Reactive Protein 1.35.     Consumes 0-50% of most meals. Pt requires full face mask for oxygenation this day. Pt experiencing anxiety with SOA and this is likely why PO intake is decreased at this time.    Expect intake to improve as pneumonia resolves.  Follow up with PO intake and order ONS if needed.     NRS - 2002, 3 points, low risk for decline     Anthropometrics        Current Height, Weight Height: 154.9 cm (61\")  Weight: 91.5 kg (201 lb 11.5 oz)   Current BMI Body mass index is 38.11 kg/m².   BMI Classification Obese Class II   % " %, 47.8 kg   Adjusted Body Weight (ABW) 65 kg (144 #)   Weight Hx  Wt Readings from Last 30 Encounters:   12/10/23 0305 91.5 kg (201 lb 11.5 oz)   12/08/23 2307 90.5 kg (199 lb 8.3 oz)   12/08/23 1440 91.4 kg (201 lb 8 oz)   11/01/23 1033 91.6 kg (202 lb)   10/26/22 1037 91.6 kg (202 lb)   10/21/21 1151 92.2 kg (203 lb 3.2 oz)   03/31/21 1350 94.3 kg (208 lb)   07/23/20 1047 94.8 kg (209 lb)   01/23/20 1213 97 kg (213 lb 12.8 oz)   08/05/19 1330 97.3 kg (214 lb 6.4 oz)   06/17/19 1537 94.8 kg (209 lb 1.6 oz)   05/23/19 1258 97.1 kg (214 lb)   05/08/19 0810 95.3 kg (210 lb)   04/29/19 1256 96.6 kg (213 lb)   04/22/19 1431 96.6 kg (213 lb)   03/13/19 0000 95.3 kg (210 lb)   04/11/18 1524 89.9 kg (198 lb 2 oz)   03/09/18 1056 89.4 kg (197 lb)   02/14/18 1522 89 kg (196 lb 3 oz)          Wt Change Observation Wt stable x 1 month.  Usual wt for past 6 years approx. 200#     Estimated/Assessed Needs  Estimated Needs based on: Adjusted Body Weight       Energy Requirements 22-25 kcal/kg    EST Needs (kcal/day) 1430 - 1625       Protein Requirements 1.0 g/kg   EST Daily Needs (g/day) 65       Fluid Requirements 25 ml/kg    Estimated Needs (mL/day) 1625 (6-7 cups)     Labs/Medications         Pertinent Labs Reviewed.   Results from last 7 days   Lab Units 12/12/23  0536 12/11/23  0401 12/10/23  0453 12/09/23  0624 12/08/23  1452   SODIUM mmol/L 136 137 134* 133* 132*   POTASSIUM mmol/L 4.3 4.0 3.6 3.3* 3.3*   CHLORIDE mmol/L 100 97* 95* 95* 94*   CO2 mmol/L 25.6 25.9 21.4* 22.9 24.8   BUN mg/dL 36* 32* 37* 21 17   CREATININE mg/dL 1.00 1.11* 1.19* 1.26* 1.17*   CALCIUM mg/dL 9.3 9.7 9.7 9.6 9.5   BILIRUBIN mg/dL  --   --   --  0.4 0.5   ALK PHOS U/L  --   --   --  88 93   ALT (SGPT) U/L  --   --   --  19 18   AST (SGOT) U/L  --   --   --  25 21   GLUCOSE mg/dL 107* 108* 101* 148* 105*     Results from last 7 days   Lab Units 12/12/23  0536 12/10/23  0453 12/09/23  0624   MAGNESIUM mg/dL 2.5*  --  2.3   PHOSPHORUS  "mg/dL 2.9   < >  --    HEMOGLOBIN g/dL 13.9   < > 13.8   HEMATOCRIT % 44.3   < > 42.7    < > = values in this interval not displayed.     COVID19   Date Value Ref Range Status   12/08/2023 Not Detected Not Detected - Ref. Range Final     No results found for: \"HGBA1C\"      Pertinent Medications Reviewed.     Malnutrition Severity Assessment                Nutrition Diagnosis         Nutrition Dx Problem 1 Inadequate oral Intake related to increased nutrient needs due to catabolic disease as evidenced by decreased appetite.       Nutrition Intervention           Current Nutrition Orders & Evaluation of Intake       Current PO Diet Diet: Cardiac Diets, Diabetic Diets; Low Sodium (2g); Consistent Carbohydrate; Texture: Regular Texture (IDDSI 7); Fluid Consistency: Thin (IDDSI 0)   Supplement No active supplement orders           Nutrition Intervention/Prescription                Medical Nutrition Therapy/Nutrition Education          Learner     Readiness N/A  N/A     Method     Response N/A  N/A     Monitor/Evaluation        Monitor Per protocol, PO intake, POC/GOC       Nutrition Discharge Plan         To be determined       Electronically signed by:  Deepthi Song RD  12/12/23 13:13 EST   "

## 2023-12-12 NOTE — SIGNIFICANT NOTE
12/12/23 0945   Coping/Psychosocial   Observed Emotional State anxious   Verbalized Emotional State relief;hopefulness   Trust Relationship/Rapport empathic listening provided   Involvement in Care interacting with patient   Additional Documentation Spiritual Care (Group)   Spiritual Care   Use of Spiritual Resources spirituality for coping, indicated strong use of   Spiritual Care Source  initiative   Spiritual Care Follow-Up follow-up, none required as presently assessed   Response to Spiritual Care receptive of support;engaged in conversation   Spiritual Care Interventions supportive conversation provided   Spiritual Care Visit Type initial   Receptivity to Spiritual Care visit welcomed

## 2023-12-12 NOTE — THERAPY EVALUATION
Acute Care - Physical Therapy Initial Evaluation   [place]     Patient Name: Shaneka Guido  : 1960  MRN: [id number]  Today's Date: 2023      Visit Dx:     ICD-10-CM ICD-9-CM   1. COPD exacerbation  J44.1 491.21   2. Hypoxia  R09.02 799.02   3. Decreased activities of daily living (ADL)  Z78.9 V49.89   4. Difficulty walking  R26.2 719.7     Patient Active Problem List   Diagnosis    Coronary artery disease of native artery of native heart with stable angina pectoris    PAD (peripheral artery disease)    Essential hypertension    Mixed hyperlipidemia    Obesity (BMI 30-39.9)    COPD exacerbation     Past Medical History:   Diagnosis Date    Anxiety disorder     Asthma     CAD (coronary artery disease)     anterior MI , BMS to LAD, then CABG x 4 (LIMA-LAD, Y SVG-OM1-OM2, SVG-rPDA)    Chronic bronchitis     Chronic constipation     Chronic diastolic (congestive) heart failure     COPD (chronic obstructive pulmonary disease)     Eczema of face     Esophageal stricture     Essential hypertension     Generalized headaches     GERD (gastroesophageal reflux disease)     Hemorrhoids     History of tobacco use     Hyperlipidemia     Incontinence     Leg edema, left     On anticoagulant therapy     Osteoarthritis     PAD (peripheral artery disease)     Panic attacks     PTSD (post-traumatic stress disorder)     per patient     Subclavian artery stenosis, left     BP should not be checked in that arm     Past Surgical History:   Procedure Laterality Date    ANGIOPLASTY      BRONCHOSCOPY      CARDIAC CATHETERIZATION N/A 2019    Procedure: Coronary angiography;  Surgeon: Nickie Zhu MD;  Location: [facility] INVASIVE LOCATION;  Service: Cardiovascular    CARDIAC CATHETERIZATION N/A 2019    Procedure: Left heart cath;  Surgeon: Nickie Zhu MD;  Location: [facility] CATH INVASIVE LOCATION;  Service: Cardiovascular    CARDIAC CATHETERIZATION N/A 2019    Procedure: Left ventriculography;  Surgeon:  Nickie Zhu MD;  Location:  [facility] CATH INVASIVE LOCATION;  Service: Cardiovascular    CARDIAC CATHETERIZATION N/A 5/8/2019    Procedure: Bypass graft study;  Surgeon: Nickie Zhu MD;  Location:  [facility] CATH INVASIVE LOCATION;  Service: Cardiovascular    CHOLECYSTECTOMY      CORONARY ARTERY BYPASS GRAFT  2012    HYSTERECTOMY      LAPAROSCOPIC TUBAL LIGATION       PT Assessment (last 12 hours)       PT Evaluation and Treatment       Row Name 12/12/23 1500          Physical Therapy Time and Intention    Document Type evaluation  -AV     Mode of Treatment individual therapy;physical therapy  -AV     Symptoms Noted During/After Treatment shortness of breath;fatigue  -AV       Row Name 12/12/23 1500          General Information    Patient Profile Reviewed yes  -AV     Patient Observations alert;cooperative;agree to therapy  -AV     Prior Level of Function independent:;all household mobility;gait;transfer;ADL's  Ambulated without an assistive device. No home O2.  -AV     Equipment Currently Used at Home none  -AV     Existing Precautions/Restrictions oxygen therapy device and L/min  -AV       Row Name 12/12/23 1500          Living Environment    Current Living Arrangements home  -AV     Home Accessibility stairs to enter home  -AV     People in Home alone  Reports she may be staying with daughter following discharge  -AV       Row Name 12/12/23 1500          Home Main Entrance    Number of Stairs, Main Entrance five  -AV     Stair Railings, Main Entrance railings on both sides of stairs  -AV       Row Name 12/12/23 1500          Cognition    Orientation Status (Cognition) oriented x 3  -AV       Row Name 12/12/23 1500          Range of Motion (ROM)    Range of Motion bilateral lower extremities;ROM is WFL  -AV       Row Name 12/12/23 1500          Strength (Manual Muscle Testing)    Strength (Manual Muscle Testing) bilateral lower extremities;strength is WFL  -AV       Row Name 12/12/23 1500          Bed Mobility     Bed Mobility bed mobility (all) activities  -AV     All Activities, [place] (Bed Mobility) standby assist  -AV       Row Name 12/12/23 1500          Transfers    Comment, (Transfers) Patient declined further transfers. She was awaiting a scan and was anxious  -AV       Row Name 12/12/23 1500          Safety Issues, Functional Mobility    Impairments Affecting Function (Mobility) balance;endurance/activity tolerance;shortness of breath  -AV       Row Name 12/12/23 1500          Balance    Balance Assessment sitting dynamic balance  -AV     Dynamic Sitting Balance standby assist  -AV     Position, Sitting Balance unsupported;sitting edge of bed  -AV       Row Name 12/12/23 1500          Plan of Care Review    Plan of Care Reviewed With patient  -AV     Progress no change  -AV     Outcome Evaluation Patient presents with deficits in balance, endurance, transfers, and ambulation. Patient will benefit from skilled PT services to address these mobility deficits and decrease risk of falls.  -AV       Row Name 12/12/23 1500          Therapy Assessment/Plan (PT)    Rehab Potential (PT) good, to achieve stated therapy goals  -AV     Criteria for Skilled Interventions Met (PT) yes;meets criteria  -AV     Therapy Frequency (PT) daily  -AV     Predicted Duration of Therapy Intervention (PT) 10 days  -AV     Problem List (PT) problems related to;balance;mobility  -AV     Activity Limitations Related to Problem List (PT) unable to ambulate safely;unable to transfer safely  -AV       Row Name 12/12/23 1500          PT Evaluation Complexity    History, PT Evaluation Complexity no personal factors and/or comorbidities  -AV     Examination of Body Systems (PT Eval Complexity) total of 4 or more elements  -AV     Clinical Presentation (PT Evaluation Complexity) stable  -AV     Clinical Decision Making (PT Evaluation Complexity) low complexity  -AV     Overall Complexity (PT Evaluation Complexity) low complexity  -AV       Row Name  12/12/23 1500          Therapy Plan Review/Discharge Plan (PT)    Therapy Plan Review (PT) evaluation/treatment results reviewed;patient  -AV       Row Name 12/12/23 1500          Physical Therapy Goals    Transfer Goal Selection (PT) transfer, PT goal 1  -AV     Gait Training Goal Selection (PT) gait training, PT goal 1  -AV       Row Name 12/12/23 1500          Transfer Goal 1 (PT)    Activity/Assistive Device (Transfer Goal 1, PT) transfers, all  -AV     [place] Level/Cues Needed (Transfer Goal 1, PT) independent  -AV     Time Frame (Transfer Goal 1, PT) 10 days  -AV       Row Name 12/12/23 1500          Gait Training Goal 1 (PT)    Activity/Assistive Device (Gait Training Goal 1, PT) gait (walking locomotion)  -AV     [place] Level (Gait Training Goal 1, PT) independent  -AV     Distance (Gait Training Goal 1, PT) 200  -AV     Time Frame (Gait Training Goal 1, PT) 10 days  -AV               User Key  (r) = Recorded By, (t) = Taken By, (c) = Cosigned By      Initials Name Provider Type    AV Owen Newman, PT Physical Therapist                    Physical Therapy Education       Title: PT OT SLP Therapies (In Progress)       Topic: Physical Therapy (In Progress)       Point: Mobility training (Done)       Learning Progress Summary             Patient Acceptance, E,TB, VU by AV at 12/12/2023 1543                         Point: Home exercise program (Not Started)       Learner Progress:  Not documented in this visit.              Point: Body mechanics (Done)       Learning Progress Summary             Patient Acceptance, E,TB, VU by AV at 12/12/2023 1543                         Point: Precautions (Done)       Learning Progress Summary             Patient Acceptance, E,TB, VU by AV at 12/12/2023 1543                                         User Key       Initials Effective Dates Name Provider Type Discipline    AV 06/11/21 -  Owen Newman, PT Physical Therapist PT                  PT  Recommendation and Plan  Anticipated Discharge Disposition (PT): inpatient rehabilitation facility  Planned Therapy Interventions (PT): balance training, bed mobility training, gait training, home exercise program, neuromuscular re-education, strengthening, transfer training  Therapy Frequency (PT): daily  Plan of Care Reviewed With: patient  Progress: no change  Outcome Evaluation: Patient presents with deficits in balance, endurance, transfers, and ambulation. Patient will benefit from skilled PT services to address these mobility deficits and decrease risk of falls.   Outcome Measures       Row Name 12/12/23 1500             How much help from another person do you currently need...    Turning from your back to your side while in flat bed without using bedrails? 4  -AV      Moving from lying on back to sitting on the side of a flat bed without bedrails? 4  -AV      Moving to and from a bed to a chair (including a wheelchair)? 3  -AV      Standing up from a chair using your arms (e.g., wheelchair, bedside chair)? 3  -AV      Climbing 3-5 steps with a railing? 3  -AV      To walk in hospital room? 3  -AV      AM-PAC 6 Clicks Score (PT) 20  -AV      Highest Level of Mobility Goal 6 --> Walk 10 steps or more  -AV         Functional Assessment    Outcome Measure Options AM-PAC 6 Clicks Basic Mobility (PT)  -AV                User Key  (r) = Recorded By, (t) = Taken By, (c) = Cosigned By      Initials Name Provider Type    Owen Macdonald, PT Physical Therapist                     Time Calculation:    PT Charges       Row Name 12/12/23 1542             Time Calculation    PT Received On 12/12/23  -AV      PT Goal Re-Cert Due Date 12/21/23  -AV         Untimed Charges    PT Eval/Re-eval Minutes 33  -AV         Total Minutes    Untimed Charges Total Minutes 33  -AV       Total Minutes 33  -AV                User Key  (r) = Recorded By, (t) = Taken By, (c) = Cosigned By      Initials Name Provider Type    FLYNN Lucas  Owen Mcpherson, PT Physical Therapist                  Therapy Charges for Today       Code Description Service Date Service Provider Modifiers Qty    11928272854 HC PT EVAL LOW COMPLEXITY 3 12/12/2023 Owen Newman, PT GP 1            PT G-Codes  Outcome Measure Options: AM-PAC 6 Clicks Basic Mobility (PT)  AM-PAC 6 Clicks Score (PT): 20  AM-PAC 6 Clicks Score (OT): 23    Owen Newman, ADAMA  12/12/2023

## 2023-12-12 NOTE — PROGRESS NOTES
Pulmonary / Critical Care Progress Note      Patient Name: Shaneka Guido  : 1960  MRN: [id number]  Primary Care Physician:  Andrea Salazar MD  Date of admission: 2023    Subjective   Subjective   Follow-up for RSV pneumonia, COPD with acute exacerbation, history of smoking in past, diastolic heart failure.    Over past 24 hours: Patient remained on Brovana and Yupelri.  Continued with Solu-Medrol 40 mg every 8 hours IV.  CT scan of the chest was ordered, however patient could not do it with severe anxiety.  Started on Zyrtec-D and montelukast.    No acute events overnight.     This morning,   Breathing is better.  Currently she is using full facemask to help her with breathing.  She is still very anxious, says that she has PTSD and is not able to handle anxiety well.  Still has wheezing.  Has some cough, does not produce much phlegm.  No nausea or vomiting.  No fever or chills.      Review of Systems  General:  Fatigue, No Fever  HEENT: No dysphagia, No Visual Changes, no rhinorrhea  Respiratory:  + cough,+Dyspnea, scant phlegm, No Pleuritic Pain, + wheezing, no hemoptysis  Cardiovascular: Denies chest pain, denies palpitations,+RUBIO, No Chest Pressure  Gastrointestinal:  No Abdominal Pain, No Nausea, No Vomiting, No Diarrhea  Genitourinary:  No Dysuria, No Frequency, No Hesitancy  Musculoskeletal: No muscle pain or swelling  Endocrine:  No Heat Intolerance, No Cold Intolerance  Neurologic:  No Confusion, no Dysarthria, No Headaches  Skin:  No Rash, No Open Wounds          Objective   Objective     Vitals:   Temp:  [97.6 °F (36.4 °C)-98.1 °F (36.7 °C)] 98.1 °F (36.7 °C)  Heart Rate:  [] 72  Resp:  [20-30] 20  BP: (131-182)/(47-77) 131/47  Flow (L/min):  [3-6] 6    Physical Exam   Vital Signs Reviewed   General:  WDWN, Alert, anxious, in mild distress, has conversational dyspnea.    HEENT:  PERRL, EOMI.  OP, nares clear, no sinus tenderness  Neck:  Supple, no JVD, no thyromegaly  Chest: Poor  aeration, bilateral diminished breath sounds, scattered expiratory wheezing, no crackles or rhonchi, resonant percussion bilaterally   CV: RRR, no MGR, pulses 2+, equal  Abd:  Soft, NT, ND, + BS, no HSM  EXT:  no clubbing, no cyanosis, no edema  Neuro:  A&Ox3, CN grossly intact, no focal deficits  Skin: No rashes or lesions noted      Result Review    Result Review:  I have personally reviewed the results from the time of this admission to 12/12/2023 08:03 EST and agree with these findings:  [x]  Laboratory  [x]  Microbiology  [x]  Radiology  [x]  EKG/Telemetry   [x]  Cardiology/Vascular   []  Pathology  []  Old records  []  Other:  Most notable findings include:         Lab 12/12/23  0536 12/11/23  0401 12/10/23  0453 12/09/23  0624 12/08/23  1452   WBC 4.73 6.74 9.98 4.49 6.42   HEMOGLOBIN 13.9 14.4 14.5 13.8 14.5   HEMATOCRIT 44.3 45.0 45.2 42.7 44.1   PLATELETS 212 280 304 259 292   SODIUM 136 137 134* 133* 132*   POTASSIUM 4.3 4.0 3.6 3.3* 3.3*   CHLORIDE 100 97* 95* 95* 94*   CO2 25.6 25.9 21.4* 22.9 24.8   BUN 36* 32* 37* 21 17   CREATININE 1.00 1.11* 1.19* 1.26* 1.17*   GLUCOSE 107* 108* 101* 148* 105*   CALCIUM 9.3 9.7 9.7 9.6 9.5   PHOSPHORUS 2.9 4.8* 3.7  --   --    TOTAL PROTEIN  --   --   --  8.2 8.4   ALBUMIN 3.5 4.1 4.1 3.8 4.1   GLOBULIN  --   --   --  4.4 4.3     XR Chest 1 View    Result Date: 12/10/2023  PROCEDURE: XR CHEST 1 VW  COMPARISON: [facility], CR, XR CHEST 1 VW, 12/08/2023, 15:10.  INDICATIONS: soa  FINDINGS:  The lungs are clear for stable incidental calcified granuloma left midlung. Cardiac, hilar, and mediastinal silhouettes are stable with evidence of previous CABG.  No pneumothorax or pleural effusion. Bony structures are intact.  The trachea is midline.          Impression:  Stable evidence of previous CABG.  No active cardiopulmonary disease.       MAILE LEWIS DO       Electronically Signed and Approved By: MAILE LEWIS DO on 12/10/2023 at 9:02                      Assessment & Plan   Assessment / Plan     Active Hospital Problems:  Active Hospital Problems    Diagnosis     **COPD exacerbation          Impression:   RSV pneumonia  COPD with acute exacerbation  History of smoking in past  Diastolic heart failure, not in acute exacerbation  History of recurrent bronchitis    Plan:   Continue with [place].  Continue Solu-Medrol 40 mg every 8 hours IV.  Check CT scan of the chest without contrast if patient is able to tolerate.  May need as needed Ativan.  Start hydroxyzine 3 times daily.  Sent serum IgE level.  Continue Zyrtec-D and montelukast.  Continue supplemental oxygen.  Currently on full facemask to help with oxygenation.    DVT prophylaxis:  Medical DVT prophylaxis orders are present.    CODE STATUS:   Level Of Support Discussed With: Patient  Code Status (Patient has no pulse and is not breathing): CPR (Attempt to Resuscitate)  Medical Interventions (Patient has pulse or is breathing): Full Support      I personally reviewed pertinent labs, imaging and provider notes. Discussed with bedside nurse and will discuss with primary service.       Electronically signed by Sedrick Silvestre MD, 12/12/2023, 08:03 EST.

## 2023-12-13 LAB
ANION GAP SERPL CALCULATED.3IONS-SCNC: 10.7 MMOL/L (ref 5–15)
BUN SERPL-MCNC: 43 MG/DL (ref 8–23)
BUN/CREAT SERPL: 39.4 (ref 7–25)
CALCIUM SPEC-SCNC: 9.6 MG/DL (ref 8.6–10.5)
CHLORIDE SERPL-SCNC: 99 MMOL/L (ref 98–107)
CO2 SERPL-SCNC: 27.3 MMOL/L (ref 22–29)
CREAT SERPL-MCNC: 1.09 MG/DL (ref 0.57–1)
DEPRECATED RDW RBC AUTO: 44 FL (ref 37–54)
EGFRCR SERPLBLD CKD-EPI 2021: 57.6 ML/MIN/1.73
ERYTHROCYTE [DISTWIDTH] IN BLOOD BY AUTOMATED COUNT: 14.5 % (ref 12.3–15.4)
GLUCOSE SERPL-MCNC: 117 MG/DL (ref 65–99)
HCT VFR BLD AUTO: 43 % (ref 34–46.6)
HGB BLD-MCNC: 13.8 G/DL (ref 12–15.9)
MAGNESIUM SERPL-MCNC: 2.5 MG/DL (ref 1.6–2.4)
MCH RBC QN AUTO: 26.9 PG (ref 26.6–33)
MCHC RBC AUTO-ENTMCNC: 32.1 G/DL (ref 31.5–35.7)
MCV RBC AUTO: 83.8 FL (ref 79–97)
PHOSPHATE SERPL-MCNC: 2.7 MG/DL (ref 2.5–4.5)
PLATELET # BLD AUTO: 250 10*3/MM3 (ref 140–450)
PMV BLD AUTO: 9.7 FL (ref 6–12)
POTASSIUM SERPL-SCNC: 4 MMOL/L (ref 3.5–5.2)
RBC # BLD AUTO: 5.13 10*6/MM3 (ref 3.77–5.28)
SODIUM SERPL-SCNC: 137 MMOL/L (ref 136–145)
WBC NRBC COR # BLD AUTO: 5.62 10*3/MM3 (ref 3.4–10.8)

## 2023-12-13 PROCEDURE — 94664 DEMO&/EVAL PT USE INHALER: CPT

## 2023-12-13 PROCEDURE — 84100 ASSAY OF PHOSPHORUS: CPT | Performed by: INTERNAL MEDICINE

## 2023-12-13 PROCEDURE — 99233 SBSQ HOSP IP/OBS HIGH 50: CPT | Performed by: INTERNAL MEDICINE

## 2023-12-13 PROCEDURE — 94799 UNLISTED PULMONARY SVC/PX: CPT

## 2023-12-13 PROCEDURE — 63710000001 REVEFENACIN 175 MCG/3ML SOLUTION: Performed by: PHYSICIAN ASSISTANT

## 2023-12-13 PROCEDURE — 80048 BASIC METABOLIC PNL TOTAL CA: CPT | Performed by: INTERNAL MEDICINE

## 2023-12-13 PROCEDURE — 99232 SBSQ HOSP IP/OBS MODERATE 35: CPT | Performed by: INTERNAL MEDICINE

## 2023-12-13 PROCEDURE — 85027 COMPLETE CBC AUTOMATED: CPT | Performed by: INTERNAL MEDICINE

## 2023-12-13 PROCEDURE — 83735 ASSAY OF MAGNESIUM: CPT | Performed by: INTERNAL MEDICINE

## 2023-12-13 PROCEDURE — 25010000002 ENOXAPARIN PER 10 MG: Performed by: INTERNAL MEDICINE

## 2023-12-13 PROCEDURE — 97110 THERAPEUTIC EXERCISES: CPT

## 2023-12-13 PROCEDURE — 25010000002 METHYLPREDNISOLONE PER 40 MG: Performed by: PHYSICIAN ASSISTANT

## 2023-12-13 RX ORDER — LEVALBUTEROL INHALATION SOLUTION 1.25 MG/3ML
1.25 SOLUTION RESPIRATORY (INHALATION) EVERY 6 HOURS PRN
Status: DISCONTINUED | OUTPATIENT
Start: 2023-12-13 | End: 2023-12-14 | Stop reason: HOSPADM

## 2023-12-13 RX ADMIN — ARFORMOTEROL TARTRATE 15 MCG: 15 SOLUTION RESPIRATORY (INHALATION) at 07:35

## 2023-12-13 RX ADMIN — CETIRIZINE HYDROCHLORIDE, PSEUDOEPHEDRINE HYDROCHLORIDE 1 TABLET: 5; 120 TABLET, FILM COATED, EXTENDED RELEASE ORAL at 20:34

## 2023-12-13 RX ADMIN — ISOSORBIDE MONONITRATE 60 MG: 60 TABLET, EXTENDED RELEASE ORAL at 09:54

## 2023-12-13 RX ADMIN — ENOXAPARIN SODIUM 40 MG: 100 INJECTION SUBCUTANEOUS at 09:53

## 2023-12-13 RX ADMIN — DOXYCYCLINE 100 MG: 100 CAPSULE ORAL at 09:54

## 2023-12-13 RX ADMIN — DOXYCYCLINE 100 MG: 100 CAPSULE ORAL at 20:33

## 2023-12-13 RX ADMIN — HYDROXYZINE HYDROCHLORIDE 25 MG: 25 TABLET, FILM COATED ORAL at 20:33

## 2023-12-13 RX ADMIN — METHYLPREDNISOLONE SODIUM SUCCINATE 40 MG: 40 INJECTION INTRAMUSCULAR; INTRAVENOUS at 09:53

## 2023-12-13 RX ADMIN — CLOPIDOGREL BISULFATE 75 MG: 75 TABLET ORAL at 09:54

## 2023-12-13 RX ADMIN — Medication 10 ML: at 09:55

## 2023-12-13 RX ADMIN — METHYLPREDNISOLONE SODIUM SUCCINATE 40 MG: 40 INJECTION INTRAMUSCULAR; INTRAVENOUS at 17:48

## 2023-12-13 RX ADMIN — PANTOPRAZOLE SODIUM 40 MG: 40 TABLET, DELAYED RELEASE ORAL at 09:54

## 2023-12-13 RX ADMIN — LEVALBUTEROL HYDROCHLORIDE 1.25 MG: 1.25 SOLUTION RESPIRATORY (INHALATION) at 00:35

## 2023-12-13 RX ADMIN — ARFORMOTEROL TARTRATE 15 MCG: 15 SOLUTION RESPIRATORY (INHALATION) at 19:10

## 2023-12-13 RX ADMIN — CETIRIZINE HYDROCHLORIDE, PSEUDOEPHEDRINE HYDROCHLORIDE 1 TABLET: 5; 120 TABLET, FILM COATED, EXTENDED RELEASE ORAL at 09:54

## 2023-12-13 RX ADMIN — MONTELUKAST 10 MG: 10 TABLET, FILM COATED ORAL at 20:33

## 2023-12-13 RX ADMIN — REVEFENACIN 175 MCG: 175 SOLUTION RESPIRATORY (INHALATION) at 07:35

## 2023-12-13 RX ADMIN — ALPRAZOLAM 0.25 MG: 0.25 TABLET ORAL at 09:54

## 2023-12-13 RX ADMIN — METOPROLOL TARTRATE 50 MG: 50 TABLET, FILM COATED ORAL at 09:54

## 2023-12-13 RX ADMIN — HYDROXYZINE HYDROCHLORIDE 25 MG: 25 TABLET, FILM COATED ORAL at 09:54

## 2023-12-13 RX ADMIN — HYDROCHLOROTHIAZIDE 25 MG: 25 TABLET ORAL at 09:54

## 2023-12-13 RX ADMIN — Medication 10 ML: at 20:34

## 2023-12-13 RX ADMIN — ASPIRIN 81 MG: 81 TABLET, COATED ORAL at 20:34

## 2023-12-13 RX ADMIN — LEVALBUTEROL HYDROCHLORIDE 1.25 MG: 1.25 SOLUTION RESPIRATORY (INHALATION) at 07:35

## 2023-12-13 RX ADMIN — ALPRAZOLAM 0.25 MG: 0.25 TABLET ORAL at 01:36

## 2023-12-13 RX ADMIN — METOPROLOL TARTRATE 50 MG: 50 TABLET, FILM COATED ORAL at 20:33

## 2023-12-13 RX ADMIN — ATORVASTATIN CALCIUM 20 MG: 40 TABLET, FILM COATED ORAL at 20:33

## 2023-12-13 RX ADMIN — HYDROXYZINE HYDROCHLORIDE 25 MG: 25 TABLET, FILM COATED ORAL at 17:48

## 2023-12-13 RX ADMIN — ISOSORBIDE MONONITRATE 60 MG: 60 TABLET, EXTENDED RELEASE ORAL at 20:32

## 2023-12-13 RX ADMIN — METHYLPREDNISOLONE SODIUM SUCCINATE 40 MG: 40 INJECTION INTRAMUSCULAR; INTRAVENOUS at 01:31

## 2023-12-13 NOTE — THERAPY TREATMENT NOTE
Patient Name: Shaneka Guido  : 1960    MRN: [id number]                              Today's Date: 2023       Admit Date: 2023    Visit Dx:     ICD-10-CM ICD-9-CM   1. COPD exacerbation  J44.1 491.21   2. Hypoxia  R09.02 799.02   3. Decreased activities of daily living (ADL)  Z78.9 V49.89   4. Difficulty walking  R26.2 719.7     Patient Active Problem List   Diagnosis    Coronary artery disease of native artery of native heart with stable angina pectoris    PAD (peripheral artery disease)    Essential hypertension    Mixed hyperlipidemia    Obesity (BMI 30-39.9)    COPD exacerbation     Past Medical History:   Diagnosis Date    Anxiety disorder     Asthma     CAD (coronary artery disease)     anterior MI , BMS to LAD, then CABG x 4 (LIMA-LAD, Y SVG-OM1-OM2, SVG-rPDA)    Chronic bronchitis     Chronic constipation     Chronic diastolic (congestive) heart failure     COPD (chronic obstructive pulmonary disease)     Eczema of face     Esophageal stricture     Essential hypertension     Generalized headaches     GERD (gastroesophageal reflux disease)     Hemorrhoids     History of tobacco use     Hyperlipidemia     Incontinence     Leg edema, left     On anticoagulant therapy     Osteoarthritis     PAD (peripheral artery disease)     Panic attacks     PTSD (post-traumatic stress disorder)     per patient     Subclavian artery stenosis, left     BP should not be checked in that arm     Past Surgical History:   Procedure Laterality Date    ANGIOPLASTY      BRONCHOSCOPY      CARDIAC CATHETERIZATION N/A 2019    Procedure: Coronary angiography;  Surgeon: Nickie Zhu MD;  Location: [facility] CATH INVASIVE LOCATION;  Service: Cardiovascular    CARDIAC CATHETERIZATION N/A 2019    Procedure: Left heart cath;  Surgeon: Nickie Zhu MD;  Location: [facility] CATH INVASIVE LOCATION;  Service: Cardiovascular    CARDIAC CATHETERIZATION N/A 2019    Procedure: Left ventriculography;  Surgeon: Marko  MD Nickie;  Location:  [facility] CATH INVASIVE LOCATION;  Service: Cardiovascular    CARDIAC CATHETERIZATION N/A 5/8/2019    Procedure: Bypass graft study;  Surgeon: Nickie Zhu MD;  Location:  [facility] CATH INVASIVE LOCATION;  Service: Cardiovascular    CHOLECYSTECTOMY      CORONARY ARTERY BYPASS GRAFT  2012    HYSTERECTOMY      LAPAROSCOPIC TUBAL LIGATION        General Information       Row Name 12/13/23 0923          OT Time and Intention    Document Type therapy note (daily note)  -PG     Mode of Treatment individual therapy;occupational therapy  -PG               User Key  (r) = Recorded By, (t) = Taken By, (c) = Cosigned By      Initials Name Provider Type    PG Catracho Varela OT Occupational Therapist                     Mobility/ADL's    No documentation.                  Obj/Interventions       Row Name 12/13/23 0924          Shoulder (Therapeutic Exercise)    Shoulder (Therapeutic Exercise) strengthening exercise  -PG     Shoulder Strengthening (Therapeutic Exercise) 15 repititions;resistance band;yellow  -PG       Row Name 12/13/23 0924          Elbow/Forearm (Therapeutic Exercise)    Elbow/Forearm (Therapeutic Exercise) strengthening exercise  -PG     Elbow/Forearm Strengthening (Therapeutic Exercise) resistance band;15 repititions;yellow  -PG       Row Name 12/13/23 0924          Motor Skills    Therapeutic Exercise shoulder;elbow/forearm  -PG               User Key  (r) = Recorded By, (t) = Taken By, (c) = Cosigned By      Initials Name Provider Type    PG Catracho Varela OT Occupational Therapist                   Goals/Plan    No documentation.                  Clinical Impression       Row Name 12/13/23 0924          Plan of Care Review    Progress no change  -PG               User Key  (r) = Recorded By, (t) = Taken By, (c) = Cosigned By      Initials Name Provider Type    PG Catracho Varela OT Occupational Therapist                   Outcome Measures       Row Name 12/13/23 0924          How much help  from another is currently needed...    Putting on and taking off regular lower body clothing? 3  -PG     Bathing (including washing, rinsing, and drying) 3  -PG     Toileting (which includes using toilet bed pan or urinal) 3  -PG     Putting on and taking off regular upper body clothing 3  -PG     Taking care of personal grooming (such as brushing teeth) 3  -PG     Eating meals 4  -PG     AM-PAC 6 Clicks Score (OT) 19  -PG       Row Name 12/12/23 6221          How much help from another person do you currently need...    Turning from your back to your side while in flat bed without using bedrails? 4  -MF     Moving from lying on back to sitting on the side of a flat bed without bedrails? 4  -MF     Moving to and from a bed to a chair (including a wheelchair)? 3  -MF     Standing up from a chair using your arms (e.g., wheelchair, bedside chair)? 3  -MF     Climbing 3-5 steps with a railing? 3  -MF     To walk in hospital room? 3  -MF     AM-PAC 6 Clicks Score (PT) 20  -MF     Highest Level of Mobility Goal 6 --> Walk 10 steps or more  -       Row Name 12/13/23 0943          Functional Assessment    Outcome Measure Options AM-PAC 6 Clicks Daily Activity (OT);Optimal Instrument  -PG       Row Name 12/13/23 0924          Optimal Instrument    Optimal Instrument Optimal - 3  -PG     Bending/Stooping 3  -PG     Standing 2  -PG     Reaching 1  -PG               User Key  (r) = Recorded By, (t) = Taken By, (c) = Cosigned By      Initials Name Provider Type    PG Catracho Varela OT Occupational Therapist     Lory Leger LPN Licensed Nurse                    Occupational Therapy Education       Title: PT OT SLP Therapies (In Progress)       Topic: Occupational Therapy (Done)       Point: ADL training (Done)       Description:   Instruct learner(s) on proper safety adaptation and remediation techniques during self care or transfers.   Instruct in proper use of assistive devices.                  Learning Progress  Summary             Patient Acceptance, E, VU by DEBORAH at 12/11/2023 1615                         Point: Home exercise program (Done)       Description:   Instruct learner(s) on appropriate technique for monitoring, assisting and/or progressing therapeutic exercises/activities.                  Learning Progress Summary             Patient Acceptance, E, VU by DEBORAH at 12/11/2023 1615                         Point: Precautions (Done)       Description:   Instruct learner(s) on prescribed precautions during self-care and functional transfers.                  Learning Progress Summary             Patient Acceptance, E, VU by DEBORAH at 12/11/2023 1615                         Point: Body mechanics (Done)       Description:   Instruct learner(s) on proper positioning and spine alignment during self-care, functional mobility activities and/or exercises.                  Learning Progress Summary             Patient Acceptance, E, VU by DEBORAH at 12/11/2023 1615                                         User Key       Initials Effective Dates Name Provider Type Discipline     09/26/22 -  Willow De La Garza OT Occupational Therapist OT                  OT Recommendation and Plan     Plan of Care Review  Progress: no change     Time Calculation:         Time Calculation- OT       Row Name 12/13/23 0925             Time Calculation- OT    OT Received On 12/13/23  -PG      OT Goal Re-Cert Due Date 12/20/23  -PG         Timed Charges    00252 - OT Therapeutic Exercise Minutes 10  -PG         Total Minutes    Timed Charges Total Minutes 10  -PG       Total Minutes 10  -PG                User Key  (r) = Recorded By, (t) = Taken By, (c) = Cosigned By      Initials Name Provider Type    PG Catracho Varela OT Occupational Therapist                  Therapy Charges for Today       Code Description Service Date Service Provider Modifiers Qty    [id number] HC OT THER PROC EA 15 MIN 12/13/2023 Catracho Varela OT GO 1                 Catracho Varela  OT  12/13/2023

## 2023-12-13 NOTE — PLAN OF CARE
Problem: Adult Inpatient Plan of Care  Goal: Plan of Care Review  Outcome: Ongoing, Progressing  Flowsheets (Taken 12/13/2023 0621)  Progress: no change  Plan of Care Reviewed With: patient  Goal: Patient-Specific Goal (Individualized)  Outcome: Ongoing, Progressing  Goal: Absence of Hospital-Acquired Illness or Injury  Outcome: Ongoing, Progressing  Intervention: Identify and Manage Fall Risk  Recent Flowsheet Documentation  Taken 12/13/2023 0514 by Lory Leger LPN  Safety Promotion/Fall Prevention: safety round/check completed  Taken 12/13/2023 0318 by Lory Leger LPN  Safety Promotion/Fall Prevention: safety round/check completed  Taken 12/13/2023 0123 by Lory Leger LPN  Safety Promotion/Fall Prevention: safety round/check completed  Taken 12/12/2023 2311 by Lory Leger LPN  Safety Promotion/Fall Prevention: safety round/check completed  Taken 12/12/2023 2120 by Lory Leger LPN  Safety Promotion/Fall Prevention: safety round/check completed  Taken 12/12/2023 1944 by Lory Leger LPN  Safety Promotion/Fall Prevention: safety round/check completed  Intervention: Prevent and Manage VTE (Venous Thromboembolism) Risk  Recent Flowsheet Documentation  Taken 12/12/2023 2311 by Lory Leger LPN  Range of Motion: active ROM (range of motion) encouraged  Intervention: Prevent Infection  Recent Flowsheet Documentation  Taken 12/12/2023 2311 by Lory Leger LPN  Infection Prevention:   hand hygiene promoted   equipment surfaces disinfected   personal protective equipment utilized   single patient room provided   visitors restricted/screened  Goal: Optimal Comfort and Wellbeing  Outcome: Ongoing, Progressing  Intervention: Provide Person-Centered Care  Recent Flowsheet Documentation  Taken 12/12/2023 2311 by Lory Leger LPN  Trust Relationship/Rapport:   care explained   choices provided   questions answered   reassurance provided   thoughts/feelings acknowledged  Goal: Readiness for  Transition of Care  Outcome: Ongoing, Progressing   Goal Outcome Evaluation:  Plan of Care Reviewed With: patient        Progress: no change     Vitals stable. Pt remains on 4L. No complaints of pain. Anxiety treated per eMAR. Will continue to monitor.

## 2023-12-13 NOTE — CONSULTS
Ms Guido does not have a qualifying ABG on file for home bilevel/RAD.  Patient has not required use of bilevel while hospitalized. I discussed possible use of LIFE 2000 for COPD to improve shortness of breath and work of breathing with ambulation/activity.  Patient declined use after watching video stating she cannot breathe through her nose and can only tolerate an oxygen mask.    Walking oximetry will be needed to qualify patient for home oxygen if she does not discharge to rehab.    COPD action plan was reviewed with patient. Patient states she takes Stiolto for maintenance. Spacer was provided to patient and she was instructed on use with mdi and respimat.

## 2023-12-13 NOTE — PROGRESS NOTES
Pulmonary / Critical Care Progress Note      Patient Name: Shaneka Guido  : 1960  MRN: [id number]  Primary Care Physician:  Andrea Salazar MD  Date of admission: 2023    Subjective   Subjective   Follow-up for RSV pneumonia, COPD with acute exacerbation, history of smoking in past, diastolic heart failure.    Over past 24 hours: Patient remained on Brovana and Yupelri.  Continued with Solu-Medrol 40 mg every 8 hours IV.  CT scan of the chest done.  Continued on Zyrtec-D and montelukast.    No acute events overnight.     This morning,   Breathing is better.  Currently she is using full facemask to help her with breathing.  Anxiety is improving.  Wheezing is better.  Has some cough, does not produce much phlegm.  No nausea or vomiting.  No fever or chills.      Review of Systems  General:  Fatigue, No Fever  HEENT: No dysphagia, No Visual Changes, no rhinorrhea  Respiratory:  + cough,+Dyspnea, scant phlegm, No Pleuritic Pain, + wheezing, no hemoptysis  Cardiovascular: Denies chest pain, denies palpitations,+RUBIO, No Chest Pressure  Gastrointestinal:  No Abdominal Pain, No Nausea, No Vomiting, No Diarrhea  Genitourinary:  No Dysuria, No Frequency, No Hesitancy  Musculoskeletal: No muscle pain or swelling  Endocrine:  No Heat Intolerance, No Cold Intolerance  Neurologic:  No Confusion, no Dysarthria, No Headaches  Skin:  No Rash, No Open Wounds          Objective   Objective     Vitals:   Temp:  [98 °F (36.7 °C)-98.3 °F (36.8 °C)] 98.3 °F (36.8 °C)  Heart Rate:  [63-84] 65  Resp:  [18-20] 20  BP: (132-163)/(59-83) 132/59  Flow (L/min):  [4-6] 4    Physical Exam   Vital Signs Reviewed   General:  WDWN, Alert, anxious, in mild distress, has conversational dyspnea.    HEENT:  PERRL, EOMI.  OP, nares clear, no sinus tenderness  Neck:  Supple, no JVD, no thyromegaly  Chest: Poor aeration, bilateral diminished breath sounds, scattered expiratory wheezing, no crackles or rhonchi, resonant percussion  bilaterally   CV: RRR, no MGR, pulses 2+, equal  Abd:  Soft, NT, ND, + BS, no HSM  EXT:  no clubbing, no cyanosis, no edema  Neuro:  A&Ox3, CN grossly intact, no focal deficits  Skin: No rashes or lesions noted      Result Review    Result Review:  I have personally reviewed the results from the time of this admission to 12/13/2023 07:42 EST and agree with these findings:  [x]  Laboratory  [x]  Microbiology  [x]  Radiology  [x]  EKG/Telemetry   [x]  Cardiology/Vascular   []  Pathology  []  Old records  []  Other:  Most notable findings include:         Lab 12/13/23  0502 12/12/23  0536 12/11/23  0401 12/10/23  0453 12/09/23  0624 12/08/23  1452   WBC 5.62 4.73 6.74 9.98 4.49 6.42   HEMOGLOBIN 13.8 13.9 14.4 14.5 13.8 14.5   HEMATOCRIT 43.0 44.3 45.0 45.2 42.7 44.1   PLATELETS 250 212 280 304 259 292   SODIUM 137 136 137 134* 133* 132*   POTASSIUM 4.0 4.3 4.0 3.6 3.3* 3.3*   CHLORIDE 99 100 97* 95* 95* 94*   CO2 27.3 25.6 25.9 21.4* 22.9 24.8   BUN 43* 36* 32* 37* 21 17   CREATININE 1.09* 1.00 1.11* 1.19* 1.26* 1.17*   GLUCOSE 117* 107* 108* 101* 148* 105*   CALCIUM 9.6 9.3 9.7 9.7 9.6 9.5   PHOSPHORUS 2.7 2.9 4.8* 3.7  --   --    TOTAL PROTEIN  --   --   --   --  8.2 8.4   ALBUMIN  --  3.5 4.1 4.1 3.8 4.1   GLOBULIN  --   --   --   --  4.4 4.3     XR Chest 1 View    Result Date: 12/10/2023  PROCEDURE: XR CHEST 1 VW  COMPARISON: [facility], CR, XR CHEST 1 VW, 12/08/2023, 15:10.  INDICATIONS: soa  FINDINGS:  The lungs are clear for stable incidental calcified granuloma left midlung. Cardiac, hilar, and mediastinal silhouettes are stable with evidence of previous CABG.  No pneumothorax or pleural effusion. Bony structures are intact.  The trachea is midline.          Impression:  Stable evidence of previous CABG.  No active cardiopulmonary disease.       MAILE LEWIS DO       Electronically Signed and Approved By: MAILE LEWIS DO on 12/10/2023 at 9:02              CT Chest Without Contrast  Diagnostic    Result Date: 12/12/2023  PROCEDURE: CT CHEST WO CONTRAST DIAGNOSTIC  COMPARISON: [facility], CT, CHEST W/O CONTRAST, 3/09/2018, 13:48.  [facility], CT, CT ANGIO ABDOMINAL AORTA BILAT ILIOFEM RUNOFF, 11/30/2023, 12:38.  INDICATIONS: Respiratory illness, nondiagnostic xray  PROTOCOL:   ION Protocol    RADIATION:   DLP: 176mGy*cm   Automated exposure control was utilized to minimize radiation dose.  TECHNIQUE: Axial images of the chest without intravenous contrast.  FINDINGS: There is motion artifact.  There is faint tree-in-bud infiltrate throughout the lung fields.  Prior median sternotomy and CABG procedure.  No evidence of pleural or pericardial effusion.  No evidence of pneumothorax.  There is no mediastinal, axillary or hilar adenopathy.  The thoracic aorta has a normal caliber.  Prior cholecystectomy.  Degenerative changes are present in the thoracic spine.  There is mild paraseptal emphysema in upper lung fields.  Calcified granulomas are present in the lingula.  IMPRESSION:  1. Motion artifact.  2. Faint tree-in-bud infiltrate throughout the lung fields likely secondary to an infectious or inflammatory process.  ROME DAILY MD       Electronically Signed and Approved By: ROME DAILY MD on 12/12/2023 at 15:38                Assessment & Plan   Assessment / Plan     Active Hospital Problems:  Active Hospital Problems    Diagnosis     **COPD exacerbation          Impression:   RSV pneumonia  COPD with acute exacerbation  History of smoking in past  Diastolic heart failure, not in acute exacerbation  History of recurrent bronchitis    Plan:   Continue with Brovana and Yupelri.  Continue Solu-Medrol 40 mg every 8 hours IV.  Switch to oral prednisone in the morning.  CT of the chest was reviewed.  Shows tree-in-bud opacities.  May need as needed Ativan.  Continue hydroxyzine 3 times daily.  Sent serum IgE level.  Continue Zyrtec-D and montelukast.  Continue  supplemental oxygen.  Currently on full facemask to help with oxygenation.    DVT prophylaxis:  Medical DVT prophylaxis orders are present.    CODE STATUS:   Level Of Support Discussed With: Patient  Code Status (Patient has no pulse and is not breathing): CPR (Attempt to Resuscitate)  Medical Interventions (Patient has pulse or is breathing): Full Support      I personally reviewed pertinent labs, imaging and provider notes. Discussed with bedside nurse and will discuss with primary service.       Electronically signed by Sedrick Silvestre MD, 12/13/2023, 07:42 EST.

## 2023-12-13 NOTE — PLAN OF CARE
Goal Outcome Evaluation:           Progress: no change  Outcome Evaluation: Vital signs stable. Pt uses venturi mask because she has deviated septum and cant breath well and is mouth breather when sleeping. Pt anxious at times, received prn Xanax prior to chest Ct and scheduled Atarax. Pt stated her anxiety is much better this evening. Will continue to assess.

## 2023-12-13 NOTE — PROGRESS NOTES
[facility]   Hospitalist Progress Note  Date: 2023  Patient Name: Shaneka Guido  : 1960  MRN: [id number]  Date of admission: 2023  Consultants:  -Pulmonology: Dr. Sedrick Silvestre    Subjective   Subjective     Chief Complaint: Shortness of breath    Summary:   Shaneka Guido is a 63 y.o. female with CAD s/p CABG, peripheral vascular disease s/p multiple revascularizations, chronic diastolic heart failure, COPD with previous smoking history presented to ED with shortness of breath.  CXR in ED negative however patient remained symptomatic.  Hospitalist service contacted for further evaluation management.  Antibiotics, steroids and nebulizer treatment started.  RSV testing returned positive.  Due to patient's continued shortness of breath pulmonology consulted.    Interval Followup:   No acute issues overnight.  Patient did have a CT scan of the chest done that showed tree-in-bud opacities.  Patient's oxygenation has been stable.  She says anxiety has improved some.  Nursing with no additional acute issues to report.  Patient denies any chest pain.    Review of Systems   All systems reviewed and negative unless stated otherwise under subjective.    Objective   Objective     Vitals:   Temp:  [97.6 °F (36.4 °C)-98.3 °F (36.8 °C)] 97.7 °F (36.5 °C)  Heart Rate:  [63-84] 64  Resp:  [18-20] 18  BP: (132-154)/(57-76) 132/57  Flow (L/min):  [4-6] 5  Physical Exam   Gen: No acute distress, lying in bed, conversant, pleasant   Resp: Poor aeration, expiratory wheezing noted, no crackles or rhonchi appreciated, equal chest rise bilaterally  Card: RRR, No m/r/g  Abd: Soft, Nontender, Nondistended, + bowel sounds    Result Review    Result Review:  I have personally reviewed the results as below and agree with these findings:  []  Laboratory:   CMP          2023    04:01 2023    05:36 2023    05:02   CMP   Glucose 108  107  117    BUN 32  36  43    Creatinine 1.11  1.00  1.09    EGFR 56.3   63.8  57.6    Sodium 137  136  137    Potassium 4.0  4.3  4.0    Chloride 97  100  99    Calcium 9.7  9.3  9.6    Albumin 4.1  3.5     BUN/Creatinine Ratio 28.8  36.0  39.4    Anion Gap 14.1  10.4  10.7      CBC          12/11/2023    04:01 12/12/2023    05:36 12/13/2023    05:02   CBC   WBC 6.74  4.73  5.62    RBC 5.34  5.09  5.13    Hemoglobin 14.4  13.9  13.8    Hematocrit 45.0  44.3  43.0    MCV 84.3  87.0  83.8    MCH 27.0  27.3  26.9    MCHC 32.0  31.4  32.1    RDW 14.6  14.7  14.5    Platelets 280  212  250    Phosphorus and magnesium within normal limits.  []  Microbiology:  []  Radiology:   [x]  EKG/Telemetry:    []  Cardiology/Vascular:    []  Pathology:  []  Old records:  []  Other:    Assessment & Plan   Assessment / Plan     Assessment:  RSV pneumonia  Acute exacerbation of COPD  Acute hypoxic respiratory failure secondary to COPD exacerbation and RSV pneumonia  CAD  Peripheral arterial disease  Essential hypertension  Hyperlipidemia    Plan:  -Pulmonology consulted and following, appreciate assistance and recommendations in the care of this patient.  -Continue supplemental O2 to maintain sats greater than 90%, wean as tolerated  -Patient had a walking oximetry done prior to discharge to see if patient qualifies for home O2  -Continue Solu-Medrol.  Plan to transition patient to prednisone  -Continue Brovana and Yupelri  -Management discussed with pulmonology.  Serum IgE level sent.  Follow-up results.  -Continue Zyrtec-D and montelukast  -Continue as needed hydroxyzine and Xanax for anxiety  -PT/OT consulted  -Will monitor electrolytes and renal function with BMP and magnesium level in the AM  -Will monitor WBC and Hgb with CBC in the AM  -Clinical course will dictate further management     DVT Prophylaxis: Lovenox  GI Prophylaxis: Pantoprazole  Diet: Cardiac/diabetic  Dispo: Home when medically appropriate for discharge  Code Status: Full code       Personally reviewed patients labs and imaging,  discussed with patient and nurse at bedside. Discussed management with the following consultants: Pulmonology.     Part of this note may be an electronic transcription/translation of spoken language to printed text using the Dragon dictation system.    DVT prophylaxis:  Medical DVT prophylaxis orders are present.    CODE STATUS:   Level Of Support Discussed With: Patient  Code Status (Patient has no pulse and is not breathing): CPR (Attempt to Resuscitate)  Medical Interventions (Patient has pulse or is breathing): Full Support      Electronically signed by Silvio Hogue MD, 12/13/23, 12:58 PM EST.

## 2023-12-14 ENCOUNTER — APPOINTMENT (OUTPATIENT)
Dept: GENERAL RADIOLOGY | Facility: HOSPITAL | Age: 63
End: 2023-12-14
Payer: COMMERCIAL

## 2023-12-14 VITALS
BODY MASS INDEX: 38.09 KG/M2 | HEART RATE: 68 BPM | OXYGEN SATURATION: 92 % | HEIGHT: 61 IN | TEMPERATURE: 98.3 F | DIASTOLIC BLOOD PRESSURE: 73 MMHG | SYSTOLIC BLOOD PRESSURE: 166 MMHG | RESPIRATION RATE: 20 BRPM | WEIGHT: 201.72 LBS

## 2023-12-14 LAB
ANION GAP SERPL CALCULATED.3IONS-SCNC: 10.4 MMOL/L (ref 5–15)
BUN SERPL-MCNC: 43 MG/DL (ref 8–23)
BUN/CREAT SERPL: 40.2 (ref 7–25)
CALCIUM SPEC-SCNC: 9.5 MG/DL (ref 8.6–10.5)
CHLORIDE SERPL-SCNC: 100 MMOL/L (ref 98–107)
CO2 SERPL-SCNC: 25.6 MMOL/L (ref 22–29)
CREAT SERPL-MCNC: 1.07 MG/DL (ref 0.57–1)
DEPRECATED RDW RBC AUTO: 43 FL (ref 37–54)
EGFRCR SERPLBLD CKD-EPI 2021: 58.9 ML/MIN/1.73
ERYTHROCYTE [DISTWIDTH] IN BLOOD BY AUTOMATED COUNT: 14.3 % (ref 12.3–15.4)
GLUCOSE SERPL-MCNC: 115 MG/DL (ref 65–99)
HCT VFR BLD AUTO: 44.8 % (ref 34–46.6)
HGB BLD-MCNC: 14.4 G/DL (ref 12–15.9)
MAGNESIUM SERPL-MCNC: 2.2 MG/DL (ref 1.6–2.4)
MCH RBC QN AUTO: 26.8 PG (ref 26.6–33)
MCHC RBC AUTO-ENTMCNC: 32.1 G/DL (ref 31.5–35.7)
MCV RBC AUTO: 83.3 FL (ref 79–97)
PLATELET # BLD AUTO: 278 10*3/MM3 (ref 140–450)
PMV BLD AUTO: 9.9 FL (ref 6–12)
POTASSIUM SERPL-SCNC: 4.1 MMOL/L (ref 3.5–5.2)
RBC # BLD AUTO: 5.38 10*6/MM3 (ref 3.77–5.28)
SODIUM SERPL-SCNC: 136 MMOL/L (ref 136–145)
WBC NRBC COR # BLD AUTO: 8.05 10*3/MM3 (ref 3.4–10.8)

## 2023-12-14 PROCEDURE — 63710000001 REVEFENACIN 175 MCG/3ML SOLUTION: Performed by: PHYSICIAN ASSISTANT

## 2023-12-14 PROCEDURE — 25010000002 METHYLPREDNISOLONE PER 40 MG: Performed by: PHYSICIAN ASSISTANT

## 2023-12-14 PROCEDURE — 94799 UNLISTED PULMONARY SVC/PX: CPT

## 2023-12-14 PROCEDURE — 80048 BASIC METABOLIC PNL TOTAL CA: CPT | Performed by: INTERNAL MEDICINE

## 2023-12-14 PROCEDURE — 74018 RADEX ABDOMEN 1 VIEW: CPT

## 2023-12-14 PROCEDURE — 99232 SBSQ HOSP IP/OBS MODERATE 35: CPT | Performed by: INTERNAL MEDICINE

## 2023-12-14 PROCEDURE — 25010000002 ENOXAPARIN PER 10 MG: Performed by: INTERNAL MEDICINE

## 2023-12-14 PROCEDURE — 99239 HOSP IP/OBS DSCHRG MGMT >30: CPT | Performed by: INTERNAL MEDICINE

## 2023-12-14 PROCEDURE — 85027 COMPLETE CBC AUTOMATED: CPT | Performed by: INTERNAL MEDICINE

## 2023-12-14 PROCEDURE — 83735 ASSAY OF MAGNESIUM: CPT | Performed by: INTERNAL MEDICINE

## 2023-12-14 PROCEDURE — 94664 DEMO&/EVAL PT USE INHALER: CPT

## 2023-12-14 RX ORDER — ALPRAZOLAM 0.25 MG/1
0.25 TABLET ORAL 3 TIMES DAILY PRN
Qty: 9 TABLET | Refills: 0
Start: 2023-12-14 | End: 2023-12-17

## 2023-12-14 RX ORDER — CETIRIZINE HYDROCHLORIDE, PSEUDOEPHEDRINE HYDROCHLORIDE 5; 120 MG/1; MG/1
1 TABLET, FILM COATED, EXTENDED RELEASE ORAL 2 TIMES DAILY
Start: 2023-12-14

## 2023-12-14 RX ORDER — PREDNISONE 20 MG/1
40 TABLET ORAL DAILY
Start: 2023-12-14 | End: 2023-12-19

## 2023-12-14 RX ORDER — POLYETHYLENE GLYCOL 3350 17 G/17G
17 POWDER, FOR SOLUTION ORAL DAILY
Status: DISCONTINUED | OUTPATIENT
Start: 2023-12-14 | End: 2023-12-14 | Stop reason: HOSPADM

## 2023-12-14 RX ORDER — FLUTICASONE PROPIONATE 50 MCG
2 SPRAY, SUSPENSION (ML) NASAL DAILY
Start: 2023-12-15

## 2023-12-14 RX ORDER — BISACODYL 10 MG
10 SUPPOSITORY, RECTAL RECTAL DAILY PRN
Status: DISCONTINUED | OUTPATIENT
Start: 2023-12-14 | End: 2023-12-14 | Stop reason: HOSPADM

## 2023-12-14 RX ORDER — AMOXICILLIN 250 MG
2 CAPSULE ORAL 2 TIMES DAILY
Status: DISCONTINUED | OUTPATIENT
Start: 2023-12-14 | End: 2023-12-14 | Stop reason: HOSPADM

## 2023-12-14 RX ORDER — PREDNISONE 20 MG/1
40 TABLET ORAL
Status: DISCONTINUED | OUTPATIENT
Start: 2023-12-15 | End: 2023-12-14 | Stop reason: HOSPADM

## 2023-12-14 RX ORDER — MONTELUKAST SODIUM 10 MG/1
10 TABLET ORAL NIGHTLY
Start: 2023-12-14

## 2023-12-14 RX ADMIN — ISOSORBIDE MONONITRATE 60 MG: 60 TABLET, EXTENDED RELEASE ORAL at 20:27

## 2023-12-14 RX ADMIN — CETIRIZINE HYDROCHLORIDE, PSEUDOEPHEDRINE HYDROCHLORIDE 1 TABLET: 5; 120 TABLET, FILM COATED, EXTENDED RELEASE ORAL at 09:00

## 2023-12-14 RX ADMIN — MONTELUKAST 10 MG: 10 TABLET, FILM COATED ORAL at 20:27

## 2023-12-14 RX ADMIN — HYDROCHLOROTHIAZIDE 25 MG: 25 TABLET ORAL at 09:00

## 2023-12-14 RX ADMIN — HYDROXYZINE HYDROCHLORIDE 25 MG: 25 TABLET, FILM COATED ORAL at 16:01

## 2023-12-14 RX ADMIN — ATORVASTATIN CALCIUM 20 MG: 40 TABLET, FILM COATED ORAL at 20:27

## 2023-12-14 RX ADMIN — CLOPIDOGREL BISULFATE 75 MG: 75 TABLET ORAL at 08:59

## 2023-12-14 RX ADMIN — PANTOPRAZOLE SODIUM 40 MG: 40 TABLET, DELAYED RELEASE ORAL at 09:00

## 2023-12-14 RX ADMIN — REVEFENACIN 175 MCG: 175 SOLUTION RESPIRATORY (INHALATION) at 06:45

## 2023-12-14 RX ADMIN — METOPROLOL TARTRATE 50 MG: 50 TABLET, FILM COATED ORAL at 08:59

## 2023-12-14 RX ADMIN — ARFORMOTEROL TARTRATE 15 MCG: 15 SOLUTION RESPIRATORY (INHALATION) at 06:45

## 2023-12-14 RX ADMIN — HYDROXYZINE HYDROCHLORIDE 25 MG: 25 TABLET, FILM COATED ORAL at 08:59

## 2023-12-14 RX ADMIN — METHYLPREDNISOLONE SODIUM SUCCINATE 40 MG: 40 INJECTION INTRAMUSCULAR; INTRAVENOUS at 08:59

## 2023-12-14 RX ADMIN — METOPROLOL TARTRATE 50 MG: 50 TABLET, FILM COATED ORAL at 20:27

## 2023-12-14 RX ADMIN — ARFORMOTEROL TARTRATE 15 MCG: 15 SOLUTION RESPIRATORY (INHALATION) at 19:59

## 2023-12-14 RX ADMIN — ISOSORBIDE MONONITRATE 60 MG: 60 TABLET, EXTENDED RELEASE ORAL at 08:59

## 2023-12-14 RX ADMIN — CETIRIZINE HYDROCHLORIDE, PSEUDOEPHEDRINE HYDROCHLORIDE 1 TABLET: 5; 120 TABLET, FILM COATED, EXTENDED RELEASE ORAL at 20:27

## 2023-12-14 RX ADMIN — ALPRAZOLAM 0.25 MG: 0.25 TABLET ORAL at 12:50

## 2023-12-14 RX ADMIN — HYDROXYZINE HYDROCHLORIDE 25 MG: 25 TABLET, FILM COATED ORAL at 20:27

## 2023-12-14 RX ADMIN — Medication 10 ML: at 20:28

## 2023-12-14 RX ADMIN — Medication 10 ML: at 09:08

## 2023-12-14 RX ADMIN — ENOXAPARIN SODIUM 40 MG: 100 INJECTION SUBCUTANEOUS at 08:59

## 2023-12-14 RX ADMIN — METHYLPREDNISOLONE SODIUM SUCCINATE 40 MG: 40 INJECTION INTRAMUSCULAR; INTRAVENOUS at 01:29

## 2023-12-14 RX ADMIN — BISACODYL 10 MG: 10 SUPPOSITORY RECTAL at 16:01

## 2023-12-14 RX ADMIN — LEVALBUTEROL HYDROCHLORIDE 1.25 MG: 1.25 SOLUTION RESPIRATORY (INHALATION) at 20:05

## 2023-12-14 RX ADMIN — ASPIRIN 81 MG: 81 TABLET, COATED ORAL at 20:27

## 2023-12-14 NOTE — PLAN OF CARE
Goal Outcome Evaluation:  Plan of Care Reviewed With: patient        Progress: improving  Outcome Evaluation: Pt dc to [facility] today via private vehicle.

## 2023-12-14 NOTE — DISCHARGE SUMMARY
[facility]         HOSPITALIST  DISCHARGE SUMMARY    Patient Name: Shaneka Guido  : 1960  MRN: [id number]    Date of Admission: 2023  Date of Discharge:  23  Primary Care Physician: Andrea Salazar MD    Consultants:  -Pulmonology: Dr. Sedrick Silvestre     Hospital Problems:  RSV pneumonia  Acute exacerbation of COPD  Acute hypoxic respiratory failure secondary to COPD exacerbation and RSV pneumonia  Chronic combined heart failure, not acutely exacerbated  CAD  Peripheral arterial disease  Essential hypertension  Hyperlipidemia    Hospital Course     Hospital Course:  Shaneka Guido is a 63 y.o. female with CAD s/p CABG, peripheral vascular disease s/p multiple revascularizations, chronic diastolic heart failure, COPD with previous smoking history presented to ED with shortness of breath.  CXR in ED negative however patient remained symptomatic.  Hospitalist service contacted for further evaluation management.  Antibiotics, steroids and nebulizer treatment started.  RSV testing returned positive.  Due to patient's continued shortness of breath pulmonology consulted.  Patient's x-ray status stabilized continue to prefer supplemental O2 via Venturi mask at time of discharge.  Pulmonology noted that patient stable for DC from their standpoint.  Patient will discharge to complete prednisone 40 mg daily x 5 days and is to follow-up with pulmonology in 1 to 2 weeks after discharge.  Given patient's medical comorbidities she is high risk for readmission, patient stated that she preferred to discharge to rehab and pursue additional workup from pulmonology as an outpatient.  On day of discharge patient hemodynamically stable and no additional inpatient evaluation organist at this time, patient discharged to rehab facility and is to follow-up with PCP and pulmonology.    DISCHARGE Follow Up Recommendations for labs and diagnostics:   -Follow-up with PCP after discharge from rehab  facility  -Follow-up with pulmonology in 1 to 2 weeks    Day of Discharge     Vital Signs:  Temp:  [97.6 °F (36.4 °C)-98.7 °F (37.1 °C)] 97.6 °F (36.4 °C)  Heart Rate:  [64-79] 70  Resp:  [18-20] 18  BP: (132-152)/(57-87) 152/73  Flow (L/min):  [4-5] 4  Physical Exam:   Gen: No acute distress, lying in bed, conversant, pleasant   Resp: Improved aeration, mild expiratory wheezing noted, no crackles or rhonchi, normal respiratory effort  Card: RRR, No m/r/g  Abd: Soft, Nontender, Nondistended, + bowel sounds    Discharge Details        Discharge Medications        New Medications        Instructions Start Date   ALPRAZolam 0.25 MG tablet  Commonly known as: XANAX   0.25 mg, Oral, 3 Times Daily PRN      cetirizine-pseudoephedrine 5-120 MG per 12 hr tablet  Commonly known as: ZyrTEC-D   1 tablet, Oral, 2 Times Daily      fluticasone 50 MCG/ACT nasal spray  Commonly known as: FLONASE   2 sprays, Each Nare, Daily   Start Date: December 15, 2023     montelukast 10 MG tablet  Commonly known as: SINGULAIR   10 mg, Oral, Nightly      predniSONE 20 MG tablet  Commonly known as: DELTASONE   40 mg, Oral, Daily             Continue These Medications        Instructions Start Date   aspirin 81 MG EC tablet   81 mg, Oral, Daily      atorvastatin 20 MG tablet  Commonly known as: LIPITOR   20 mg, Oral, Daily      clopidogrel 75 MG tablet  Commonly known as: PLAVIX   75 mg, Oral, Daily      furosemide 20 MG tablet  Commonly known as: LASIX   20 mg, Oral, Daily PRN, as directed      hydroCHLOROthiazide 25 MG tablet  Commonly known as: HYDRODIURIL   25 mg, Oral, Daily      isosorbide mononitrate 60 MG 24 hr tablet  Commonly known as: IMDUR   60 mg, Oral, 2 Times Daily      levalbuterol 45 MCG/ACT inhaler  Commonly known as: XOPENEX HFA   1-2 puffs, Inhalation, Every 4 Hours PRN      metoprolol tartrate 50 MG tablet  Commonly known as: LOPRESSOR   50 mg, Oral, 2 Times Daily      pantoprazole 40 MG EC tablet  Commonly known as: PROTONIX    40 mg, Oral, Daily      tiotropium bromide-olodaterol 2.5-2.5 MCG/ACT aerosol solution inhaler  Commonly known as: STIOLTO RESPIMAT   2 puffs, Inhalation, Daily - RT               Allergies   Allergen Reactions   • Albuterol Other (See Comments)     Mouth blisters, throat swells   • Amlodipine Swelling   • Latex Other (See Comments)     Severe blistering, throat swells   • Penicillins Other (See Comments)     Throat swells   • Prednisone Other (See Comments)     Fluid retention.    • Influenza Vaccines Rash       Discharge Disposition:  Rehab Facility or Unit (DC - External)    Diet:  Hospital:  Diet Order   Procedures   • Diet: Cardiac Diets, Diabetic Diets; Low Sodium (2g); Consistent Carbohydrate; Texture: Regular Texture (IDDSI 7); Fluid Consistency: Thin (IDDSI 0)       Discharge Activity:   Activity Instructions       Activity as Tolerated              CODE STATUS:  Code Status and Medical Interventions:   Ordered at: 12/08/23 1925     Level Of Support Discussed With:    Patient     Code Status (Patient has no pulse and is not breathing):    CPR (Attempt to Resuscitate)     Medical Interventions (Patient has pulse or is breathing):    Full Support       Future Appointments   Date Time Provider Department Center   5/20/2024  2:30 PM Demond Owen MD [facility]       Additional Instructions for the Follow-ups that You Need to Schedule       Discharge Follow-up with PCP   As directed       Currently Documented PCP:    Andrea Salazar MD    PCP Phone Number:    [phone]     Follow Up Details: Follow-up in 3 to 5 days        Discharge Follow-up with Specified Provider: Dr. Sedrick Silvestre; 2 Weeks   As directed      To: Dr. Sedrick Silvestre   Follow Up: 2 Weeks                Pertinent  and/or Most Recent Results     RADIOLOGY:  CT Chest Without Contrast Diagnostic [[id number]] Ernesto as Reviewed   Order Status: Completed Collected: 12/12/23 1537    Updated: 12/12/23 1541   Narrative:     PROCEDURE: CT CHEST WO  CONTRAST DIAGNOSTIC     COMPARISON: [facility], CT, CHEST W/O CONTRAST, 3/09/2018, 13:48.  [facility], CT, CT ANGIO ABDOMINAL AORTA BILAT ILIOFEM RUNOFF, 11/30/2023, 12:38.     INDICATIONS: Respiratory illness, nondiagnostic xray     PROTOCOL:   ION Protocol     RADIATION:   DLP: 176mGy*cm    Automated exposure control was utilized to minimize radiation dose.     TECHNIQUE: Axial images of the chest without intravenous contrast.     FINDINGS:  There is motion artifact.  There is faint tree-in-bud infiltrate throughout the lung fields.  Prior  median sternotomy and CABG procedure.  No evidence of pleural or pericardial effusion.  No evidence  of pneumothorax.  There is no mediastinal, axillary or hilar adenopathy.  The thoracic aorta has a  normal caliber.  Prior cholecystectomy.  Degenerative changes are present in the thoracic spine.    There is mild paraseptal emphysema in upper lung fields.  Calcified granulomas are present in the  lingula.     IMPRESSION:     1. Motion artifact.     2. Faint tree-in-bud infiltrate throughout the lung fields likely secondary to an infectious or  inflammatory process.     ROME DAILY MD        Electronically Signed and Approved By: ROME DAILY MD on 12/12/2023 at 15:38                   XR Chest 1 View [[id number]] Ernesto as Reviewed   Order Status: Completed Collected: 12/10/23 0902    Updated: 12/10/23 0906   Narrative:     PROCEDURE: XR CHEST 1 VW     COMPARISON: [facility], CR, XR CHEST 1 VW, 12/08/2023, 15:10.     INDICATIONS: soa     FINDINGS:  The lungs are clear for stable incidental calcified granuloma left midlung. Cardiac, hilar, and  mediastinal silhouettes are stable with evidence of previous CABG.  No pneumothorax or pleural  effusion. Bony structures are intact.  The trachea is midline.                  Impression:     Stable evidence of previous CABG.  No active cardiopulmonary disease.                   MAILE LEWIS DO        Electronically Signed and Approved By: MAILE LEWIS DO on 12/10/2023 at 9:02                   XR Chest 1 View [[id number]] Ernesto as Reviewed   Order Status: Completed Collected: 12/08/23 1538    Updated: 12/08/23 1541   Narrative:     PROCEDURE: XR CHEST 1 VW     COMPARISON: [facility], CR, CHEST PA/AP & LAT 2V, 3/09/2018, 10:28.     INDICATIONS: SOA     FINDINGS:  Mildly increased interstitial markings are noted in the lung bases, possibly representing chronic  interstitial lung disease related to a history of smoking.  No acute appearing consolidation is  seen.  The cardiac and mediastinal silhouettes appear normal.  No effusion is seen.  CABG has been  performed.      Impression:       1. Previous CABG  2. No acute infiltrate or effusion  3. Suspected chronic interstitial lung disease related to smoking                  Dejuan Michael M.D.        Electronically Signed and Approved By: Dejuan Michael M.D. on 12/08/2023 at 15:38       LAB RESULTS:      Lab 12/14/23  0545 12/13/23  0502 12/12/23  0536 12/11/23  0401 12/10/23  0453 12/09/23  0624 12/08/23  1452   WBC 8.05 5.62 4.73 6.74 9.98 4.49 6.42   HEMOGLOBIN 14.4 13.8 13.9 14.4 14.5 13.8 14.5   HEMATOCRIT 44.8 43.0 44.3 45.0 45.2 42.7 44.1   PLATELETS 278 250 212 280 304 259 292   NEUTROS ABS  --   --  3.71 5.65 8.55* 3.77 5.12   IMMATURE GRANS (ABS)  --   --  0.01 0.01 0.04 0.02 0.02   LYMPHS ABS  --   --  0.59* 0.69* 0.84 0.51* 0.59*   MONOS ABS  --   --  0.40 0.38 0.52 0.18 0.57   EOS ABS  --   --  0.00 0.00 0.00 0.00 0.08   MCV 83.3 83.8 87.0 84.3 83.9 82.3 82.3   CRP  --   --  1.35*  --   --   --   --    PROCALCITONIN  --   --   --   --  0.15 0.14 0.11         Lab 12/14/23  0545 12/13/23  0502 12/12/23  0536 12/11/23  0401 12/10/23  0453 12/09/23  0624   SODIUM 136 137 136 137 134* 133*   POTASSIUM 4.1 4.0 4.3 4.0 3.6 3.3*   CHLORIDE 100 99 100 97* 95* 95*   CO2 25.6 27.3 25.6 25.9 21.4* 22.9   ANION GAP 10.4  10.7 10.4 14.1 17.6* 15.1*   BUN 43* 43* 36* 32* 37* 21   CREATININE 1.07* 1.09* 1.00 1.11* 1.19* 1.26*   EGFR 58.9* 57.6* 63.8 56.3* 51.8* 48.4*   GLUCOSE 115* 117* 107* 108* 101* 148*   CALCIUM 9.5 9.6 9.3 9.7 9.7 9.6   MAGNESIUM 2.2 2.5* 2.5*  --   --  2.3   PHOSPHORUS  --  2.7 2.9 4.8* 3.7  --          Lab 12/12/23  0536 12/11/23  0401 12/10/23  0453 12/09/23  0624 12/08/23  1452   TOTAL PROTEIN  --   --   --  8.2 8.4   ALBUMIN 3.5 4.1 4.1 3.8 4.1   GLOBULIN  --   --   --  4.4 4.3   ALT (SGPT)  --   --   --  19 18   AST (SGOT)  --   --   --  25 21   BILIRUBIN  --   --   --  0.4 0.5   ALK PHOS  --   --   --  88 93         Lab 12/10/23  0453 12/08/23  1452   PROBNP 649.3 361.2   HSTROP T  --  12                 Lab 12/10/23  0829   PH, ARTERIAL 7.369   PCO2, ARTERIAL 39.6   PO2 ART 67.7*   O2 SATURATION ART 92.5*   HCO3 ART 22.3   BASE EXCESS ART -2.7*   CARBOXYHEMOGLOBIN 0.2     Brief Urine Lab Results       None          Microbiology Results (last 10 days)       Procedure Component Value - Date/Time    Respiratory Culture - Sputum, Cough [[id number]] Collected: 12/09/23 1428    Lab Status: Final result Specimen: Sputum from Cough Updated: 12/11/23 1154     Respiratory Culture Light growth (2+) Normal respiratory darrel. No S. aureus or Pseudomonas aeruginosa detected. Final report.     Gram Stain Rare (1+) Epithelial cells per low power field      Moderate (3+) WBCs per low power field      Few (2+) Gram positive cocci in pairs and chains    S. Pneumo Ag Urine or CSF - Urine, Urine, Clean Catch [[id number]]  (Normal) Collected: 12/08/23 2342    Lab Status: Final result Specimen: Urine, Clean Catch Updated: 12/09/23 1123     Strep Pneumo Ag Negative    Legionella Antigen, Urine - Urine, Urine, Clean Catch [[id number]]  (Normal) Collected: 12/08/23 2342    Lab Status: Final result Specimen: Urine, Clean Catch Updated: 12/09/23 1123     LEGIONELLA ANTIGEN, URINE Negative    COVID-19, FLU A/B, RSV PCR 1 HR TAT - Swab,  Nasopharynx [[id number]]  (Abnormal) Collected: 12/08/23 2000    Lab Status: Final result Specimen: Swab from Nasopharynx Updated: 12/08/23 2100     COVID19 Not Detected     Influenza A PCR Not Detected     Influenza B PCR Not Detected     RSV, PCR Detected    Narrative:      Fact sheet for providers: https://www.fda.gov/media/105669/download    Fact sheet for patients: https://www.fda.gov/media/454416/download    Test performed by PCR.              Results for orders placed during the hospital encounter of 08/02/19    Doppler arterial lower extremity stress CAR    Interpretation Summary  · Right Conclusion: The right ISAAC is normal. Normal digital pressures.  · Left Conclusion: The left ISAAC is mildly reduced. Normal digital pressures.      Results for orders placed during the hospital encounter of 08/02/19    Doppler arterial lower extremity stress CAR    Interpretation Summary  · Right Conclusion: The right ISAAC is normal. Normal digital pressures.  · Left Conclusion: The left ISAAC is mildly reduced. Normal digital pressures.      Results for orders placed during the hospital encounter of 12/08/23    Adult Transthoracic Echo Complete W/ Cont if Necessary Per Protocol    Interpretation Summary  •  Left ventricular ejection fraction appears to be 36 - 40%.  •  Left ventricular wall thickness is consistent with mild concentric hypertrophy.  •  Left ventricular diastolic function is consistent with (grade Ia w/high LAP) impaired relaxation.  •  The left atrial cavity is mildly dilated.  •  Estimated right ventricular systolic pressure from tricuspid regurgitation is moderately elevated (45-55 mmHg).      Time spent on Discharge including face to face service:  35 minutes    Electronically signed by Silvio Hogue MD, 12/14/23, 9:52 AM EST.

## 2023-12-14 NOTE — PROGRESS NOTES
Pulmonary / Critical Care Progress Note      Patient Name: Shaneka Guido  : 1960  MRN: [id number]  Primary Care Physician:  Andrea Salazar MD  Date of admission: 2023    Subjective   Subjective   Follow-up for RSV pneumonia, COPD with acute exacerbation, history of smoking in past, diastolic heart failure.    Over past 24 hours: Patient remained on Brovana and Yupelri.  Continued with Solu-Medrol 40 mg every 8 hours IV. Continued on Zyrtec-D and montelukast.    No acute events overnight.     This morning,   Breathing is better.  Currently she is using full facemask to help her with breathing.  Wheezing is resolved.  Has some cough, does not produce much phlegm.  No nausea or vomiting.  No fever or chills.      Review of Systems  General:  Fatigue, No Fever  HEENT: No dysphagia, No Visual Changes, no rhinorrhea  Respiratory:  + cough,+Dyspnea, scant phlegm, No Pleuritic Pain, + wheezing, no hemoptysis  Cardiovascular: Denies chest pain, denies palpitations,+RUBIO, No Chest Pressure  Gastrointestinal:  No Abdominal Pain, No Nausea, No Vomiting, No Diarrhea  Genitourinary:  No Dysuria, No Frequency, No Hesitancy  Neurologic:  No Confusion, no Dysarthria, No Headaches  Skin:  No Rash, No Open Wounds          Objective   Objective     Vitals:   Temp:  [97.6 °F (36.4 °C)-98.7 °F (37.1 °C)] 97.6 °F (36.4 °C)  Heart Rate:  [64-79] 70  Resp:  [18-20] 18  BP: (132-152)/(57-87) 152/73  Flow (L/min):  [4-5] 4    Physical Exam   Vital Signs Reviewed   General:  WDWN, Alert, anxious, in mild distress, has conversational dyspnea.    HEENT:  PERRL, EOMI.  OP, nares clear, no sinus tenderness  Neck:  Supple, no JVD, no thyromegaly  Chest: Poor aeration, bilateral diminished breath sounds, no wheezing, no crackles or rhonchi, resonant percussion bilaterally   CV: RRR, no MGR, pulses 2+, equal  Abd:  Soft, NT, ND, + BS, no HSM  EXT:  no clubbing, no cyanosis, no edema  Neuro:  A&Ox3, CN grossly intact, no focal  deficits  Skin: No rashes or lesions noted      Result Review    Result Review:  I have personally reviewed the results from the time of this admission to 12/14/2023 10:15 EST and agree with these findings:  [x]  Laboratory  [x]  Microbiology  [x]  Radiology  [x]  EKG/Telemetry   [x]  Cardiology/Vascular   []  Pathology  []  Old records  []  Other:  Most notable findings include:         Lab 12/14/23  0545 12/13/23  0502 12/12/23  0536 12/11/23  0401 12/10/23  0453 12/09/23  0624 12/08/23  1452   WBC 8.05 5.62 4.73 6.74 9.98 4.49 6.42   HEMOGLOBIN 14.4 13.8 13.9 14.4 14.5 13.8 14.5   HEMATOCRIT 44.8 43.0 44.3 45.0 45.2 42.7 44.1   PLATELETS 278 250 212 280 304 259 292   SODIUM 136 137 136 137 134* 133* 132*   POTASSIUM 4.1 4.0 4.3 4.0 3.6 3.3* 3.3*   CHLORIDE 100 99 100 97* 95* 95* 94*   CO2 25.6 27.3 25.6 25.9 21.4* 22.9 24.8   BUN 43* 43* 36* 32* 37* 21 17   CREATININE 1.07* 1.09* 1.00 1.11* 1.19* 1.26* 1.17*   GLUCOSE 115* 117* 107* 108* 101* 148* 105*   CALCIUM 9.5 9.6 9.3 9.7 9.7 9.6 9.5   PHOSPHORUS  --  2.7 2.9 4.8* 3.7  --   --    TOTAL PROTEIN  --   --   --   --   --  8.2 8.4   ALBUMIN  --   --  3.5 4.1 4.1 3.8 4.1   GLOBULIN  --   --   --   --   --  4.4 4.3          CT Chest Without Contrast Diagnostic    Result Date: 12/12/2023  PROCEDURE: CT CHEST WO CONTRAST DIAGNOSTIC  COMPARISON: [facility], CT, CHEST W/O CONTRAST, 3/09/2018, 13:48.  [facility], CT, CT ANGIO ABDOMINAL AORTA BILAT ILIOFEM RUNOFF, 11/30/2023, 12:38.  INDICATIONS: Respiratory illness, nondiagnostic xray  PROTOCOL:   ION Protocol    RADIATION:   DLP: 176mGy*cm   Automated exposure control was utilized to minimize radiation dose.  TECHNIQUE: Axial images of the chest without intravenous contrast.  FINDINGS: There is motion artifact.  There is faint tree-in-bud infiltrate throughout the lung fields.  Prior median sternotomy and CABG procedure.  No evidence of pleural or pericardial effusion.  No evidence  of pneumothorax.  There is no mediastinal, axillary or hilar adenopathy.  The thoracic aorta has a normal caliber.  Prior cholecystectomy.  Degenerative changes are present in the thoracic spine.  There is mild paraseptal emphysema in upper lung fields.  Calcified granulomas are present in the lingula.  IMPRESSION:  1. Motion artifact.  2. Faint tree-in-bud infiltrate throughout the lung fields likely secondary to an infectious or inflammatory process.  ROME DAILY MD       Electronically Signed and Approved By: ROME DAILY MD on 12/12/2023 at 15:38                Assessment & Plan   Assessment / Plan     Active Hospital Problems:  Active Hospital Problems    Diagnosis     **COPD exacerbation          Impression:   RSV pneumonia  COPD with acute exacerbation  History of smoking in past  Diastolic heart failure, not in acute exacerbation  History of recurrent bronchitis    Plan:   Continue with Wagner.  Continue Solu-Medrol 40 mg every 8 hours IV.  Stable for discharge.  Will DC home on prednisone 40 mg once daily for 5 days.  CT of the chest was reviewed.  Shows tree-in-bud opacities.  Needs repeat CT scan of the chest in 2 to 3 months.  May need as needed Ativan.  Continue hydroxyzine 3 times daily.  Sent serum IgE level.  Continue Zyrtec-D and montelukast.  Continue supplemental oxygen.  Currently on full facemask to help with oxygenation.  Can discharge to rehab from pulmonary standpoint.    DVT prophylaxis:  Medical DVT prophylaxis orders are present.    CODE STATUS:   Level Of Support Discussed With: Patient  Code Status (Patient has no pulse and is not breathing): CPR (Attempt to Resuscitate)  Medical Interventions (Patient has pulse or is breathing): Full Support      I personally reviewed pertinent labs, imaging and provider notes.   Discussed with bedside nurse and will discuss with primary service.       Electronically signed by Sedrick Silvestre MD, 12/14/2023, 10:15 EST.

## 2023-12-14 NOTE — PLAN OF CARE
Goal Outcome Evaluation:                      Remains on 4L venturi mask. Pt hoping to discharge today. No acute events overnight.

## 2024-01-03 NOTE — PROGRESS NOTES
Primary Care Provider  Andrea Salazar MD   Referring Provider  No ref. provider found    Patient Complaint  Follow-up, Cough, Wheezing, and Shortness of Breath      SUBJECTIVE    History of Presenting Illness  Shaneka Guido is a pleasant 63 y.o. female who presents to [facility] for hospital followup. Patient was at [facility] 12/8-12/14/2023 for RSV pneumonia, COPD exacerbation, acute respiratory failure hypoxia secondary to COPD exacerbation and RSV pneumonia.  Hospital course includes the following:Shaneka Guido is a 63 y.o. female with CAD s/p CABG, peripheral vascular disease s/p multiple revascularizations, chronic diastolic heart failure, COPD with previous smoking history presented to ED with shortness of breath.  CXR in ED negative however patient remained symptomatic.  Hospitalist service contacted for further evaluation management.  Antibiotics, steroids and nebulizer treatment started.  RSV testing returned positive.  Due to patient's continued shortness of breath pulmonology consulted.  Patient's x-ray status stabilized continue to prefer supplemental O2 via Venturi mask at time of discharge.  Pulmonology noted that patient stable for DC from their standpoint.  Patient will discharge to complete prednisone 40 mg daily x 5 days and is to follow-up with pulmonology in 1 to 2 weeks after discharge.  Given patient's medical comorbidities she is high risk for readmission, patient stated that she preferred to discharge to rehab and pursue additional workup from pulmonology as an outpatient.  On day of discharge patient hemodynamically stable and no additional inpatient evaluation organist at this time, patient discharged to rehab facility and is to follow-up with PCP and pulmonology.     Patient states overall she is doing well since her hospitalization.  Patient states she is continuing on prednisone which was prescribed by Dr. Salazar at the rehab  hospital.  Patient states she is feeling very symptomatic and out of her head feeling with the prednisone.  She stopped taking her Zyrtec-D which is also prescribed by hospitalist on discharge.  Patient continues on Singulair.  Patient states she does not like this feeling and thinks that the medications.  Patient is on albuterol inhaler and Stiolto by her PCP and states she never had a problem till she got this RSV pneumonia.  At present time she denies dyspnea, coughing, wheezing, headaches, chest pain, weight loss or hemoptysis. Denies fevers, chills and night sweats. Shaneka Guido is able to perform ADLs without difficulties and denies any swollen glands/lymph nodes in the head or neck.    I have personally reviewed the review of systems, past family, social, medical and surgical histories; and agree with their findings.    Review of Systems  Constitutional symptoms:  Denied complaints   Ear, nose, throat: Denied complaints  Cardiovascular:  Denied complaints  Respiratory: Denied complaints  Gastrointestinal: Denied complaints  Musculoskeletal: Denied complaints    Family History   Problem Relation Age of Onset    Chronic bronchitis Mother     Heart disease Mother     Hypertension Mother     Osteoporosis Mother     Cancer Father     Heart disease Father     Diabetes Father     Hypertension Sister     Osteoporosis Sister     Asthma Daughter     Hypertension Daughter     Osteoporosis Daughter     Hypertension Son     Chronic bronchitis Son     Malig Hyperthermia Neg Hx         Social History     Socioeconomic History    Marital status: Single   Tobacco Use    Smoking status: Former     Packs/day: 1.00     Years: 45.00     Additional pack years: 0.00     Total pack years: 45.00     Types: Cigarettes     Quit date: 2018     Years since quittin.5    Smokeless tobacco: Never   Vaping Use    Vaping Use: Never used   Substance and Sexual Activity    Alcohol use: No     Comment: CAFFEINE USE: SOFT DRINKS  OCCASIONALLY.     Drug use: No    Sexual activity: Defer        Past Medical History:   Diagnosis Date    Anxiety disorder     Asthma     CAD (coronary artery disease)     anterior MI 2012, BMS to LAD, then CABG x 4 (LIMA-LAD, Y SVG-OM1-OM2, SVG-rPDA)    Chronic bronchitis     Chronic constipation     Chronic diastolic (congestive) heart failure     COPD (chronic obstructive pulmonary disease)     Eczema of face     Esophageal stricture     Essential hypertension     Generalized headaches     GERD (gastroesophageal reflux disease)     Hemorrhoids     History of tobacco use     Hyperlipidemia     Incontinence     Leg edema, left     On anticoagulant therapy     Osteoarthritis     PAD (peripheral artery disease)     Panic attacks     PTSD (post-traumatic stress disorder)     per patient     Subclavian artery stenosis, left     BP should not be checked in that arm          There is no immunization history on file for this patient.    Allergies   Allergen Reactions    Albuterol Other (See Comments)     Mouth blisters, throat swells    Amlodipine Swelling    Latex Other (See Comments)     Severe blistering, throat swells    Penicillins Other (See Comments)     Throat swells    Prednisone Other (See Comments)     Fluid retention.     Influenza Vaccines Rash          Current Outpatient Medications:     ALPRAZolam (XANAX) 0.25 MG tablet, Take 1 tablet by mouth Every 12 (Twelve) Hours., Disp: , Rfl:     aspirin 81 MG EC tablet, Take 1 tablet by mouth Daily., Disp: , Rfl:     atorvastatin (LIPITOR) 20 MG tablet, Take 1 tablet by mouth Daily., Disp: , Rfl:     clopidogrel (PLAVIX) 75 MG tablet, Take 1 tablet by mouth Daily., Disp: , Rfl: 2    furosemide (LASIX) 20 MG tablet, Take 1 tablet by mouth Daily As Needed. as directed, Disp: , Rfl:     hydroCHLOROthiazide (HYDRODIURIL) 25 MG tablet, Take 1 tablet by mouth Daily., Disp: 90 tablet, Rfl: 3    isosorbide mononitrate (IMDUR) 60 MG 24 hr tablet, Take 1 tablet by mouth 2  "(Two) Times a Day., Disp: , Rfl:     levalbuterol (XOPENEX HFA) 45 MCG/ACT inhaler, Inhale 1-2 puffs Every 4 (Four) Hours As Needed for Wheezing., Disp: , Rfl:     metoprolol tartrate (LOPRESSOR) 50 MG tablet, Take 1 tablet by mouth 2 (Two) Times a Day., Disp: 180 tablet, Rfl: 0    montelukast (SINGULAIR) 10 MG tablet, Take 1 tablet by mouth Every Night., Disp: , Rfl:     pantoprazole (PROTONIX) 40 MG EC tablet, Take 1 tablet by mouth Daily., Disp: , Rfl:     tiotropium bromide-olodaterol (STIOLTO RESPIMAT) 2.5-2.5 MCG/ACT aerosol solution inhaler, Inhale 2 puffs Daily., Disp: , Rfl:     cetirizine-pseudoephedrine (ZyrTEC-D) 5-120 MG per 12 hr tablet, Take 1 tablet by mouth 2 (Two) Times a Day. (Patient not taking: Reported on 1/4/2024), Disp: , Rfl:     fluticasone (FLONASE) 50 MCG/ACT nasal spray, 2 sprays by Each Nare route Daily. (Patient not taking: Reported on 1/4/2024), Disp: , Rfl:     ondansetron (ZOFRAN) 4 MG tablet, Take 1 tablet by mouth. (Patient not taking: Reported on 1/4/2024), Disp: , Rfl:            Vital Signs   /75 (BP Location: Right arm, Patient Position: Sitting, Cuff Size: Adult)   Pulse 78   Temp 97.3 °F (36.3 °C) (Temporal)   Resp 18   Ht 154.9 cm (61\")   Wt 92.5 kg (204 lb)   SpO2 94% Comment: room air  BMI 38.55 kg/m²       OBJECTIVE    Physical Exam  Vital Signs Reviewed   WDWN, Alert, NAD.    HEENT:  PERRL, EOMI.  OP, nares clear  Neck:  Supple, no JVD, no thyromegaly  Chest:  good aeration, clear to auscultation bilaterally, tympanic to percussion bilaterally, no work of breathing noted  CV: RRR, no MGR, pulses 2+, equal.  Abd:  Soft, NT, ND, + BS, no HSM  EXT:  no clubbing, no cyanosis, no edema  Neuro:  A&Ox3, CN grossly intact, no focal deficits.  Skin: No rashes or lesions noted    Results Review  I have personally reviewed the prior office notes, hospital records, labs, and diagnostics.  CT Chest Without Contrast Diagnostic [[id number]] (Order [id number])  Order  Status: " Final result     Appointment Information    PACS Images     Radiology Images  Study Result    Narrative & Impression   PROCEDURE:  CT CHEST WO CONTRAST DIAGNOSTIC     COMPARISON: [facility], CT, CHEST W/O CONTRAST, 3/09/2018, 13:48.  [facility], CT, CT ANGIO ABDOMINAL AORTA BILAT ILIOFEM RUNOFF, 11/30/2023, 12:38.     INDICATIONS:  Respiratory illness, nondiagnostic xray     PROTOCOL:     ION Protocol                 RADIATION:      DLP: 176mGy*cm               Automated exposure control was utilized to minimize radiation dose.      TECHNIQUE:    Axial images of the chest without intravenous contrast.     FINDINGS:  There is motion artifact.  There is faint tree-in-bud infiltrate throughout the lung fields.  Prior   median sternotomy and CABG procedure.  No evidence of pleural or pericardial effusion.  No evidence   of pneumothorax.  There is no mediastinal, axillary or hilar adenopathy.  The thoracic aorta has a   normal caliber.  Prior cholecystectomy.  Degenerative changes are present in the thoracic spine.    There is mild paraseptal emphysema in upper lung fields.  Calcified granulomas are present in the   lingula.     IMPRESSION:      1. Motion artifact.     2. Faint tree-in-bud infiltrate throughout the lung fields likely secondary to an infectious or   inflammatory process.     ROME DAILY MD         Electronically Signed and Approved By: ROME DAILY MD on 12/12/2023 at 15:38     ASSESSMENT         Patient Active Problem List   Diagnosis    Coronary artery disease of native artery of native heart with stable angina pectoris    PAD (peripheral artery disease)    Essential hypertension    Mixed hyperlipidemia    Obesity (BMI 30-39.9)    COPD exacerbation       Encounter Diagnoses   Name Primary?    COPD exacerbation Yes    Pneumonia due to respiratory syncytial virus (RSV)     Acute respiratory failure with hypoxia       PLAN  -Discussed with patient she should  have been on prednisone for 5 days after hospital discharge per Dr. Silvestre's notes  -Recommend patient to stop taking her prednisone and Singulair and Zyrtec at this time  -Patient to follow-up with her PCP regarding why she was continuing with prednisone as she is unclear  -Patient will be scheduled for PFT and CT scan  -Patient will follow back up after her PFT and CT for results or sooner if needed  -Patient to continue with Stiolto and albuterol as prescribed by her PCP.  Diagnoses and all orders for this visit:    1. COPD exacerbation (Primary)  -     Complete PFT - Pre & Post Bronchodilator; Future  -     CT Chest Without Contrast; Future    2. Pneumonia due to respiratory syncytial virus (RSV)  -     CT Chest Without Contrast; Future    3. Acute respiratory failure with hypoxia  -     CT Chest Without Contrast; Future           Smoking status: Former  Vaccination status: Declines  Medications personally reviewed    Follow Up  Return in about 10 weeks (around 3/14/2024).    Patient was given instructions and counseling regarding her condition or for health maintenance advice. Please see specific information pulled into the AVS if appropriate.

## 2024-01-04 ENCOUNTER — OFFICE VISIT (OUTPATIENT)
Dept: PULMONOLOGY | Facility: CLINIC | Age: 64
End: 2024-01-04
Payer: MEDICAID

## 2024-01-04 VITALS
OXYGEN SATURATION: 94 % | TEMPERATURE: 97.3 F | HEART RATE: 78 BPM | DIASTOLIC BLOOD PRESSURE: 75 MMHG | BODY MASS INDEX: 38.51 KG/M2 | HEIGHT: 61 IN | RESPIRATION RATE: 18 BRPM | SYSTOLIC BLOOD PRESSURE: 124 MMHG | WEIGHT: 204 LBS

## 2024-01-04 DIAGNOSIS — J44.1 COPD EXACERBATION: Primary | ICD-10-CM

## 2024-01-04 DIAGNOSIS — J12.1 PNEUMONIA DUE TO RESPIRATORY SYNCYTIAL VIRUS (RSV): ICD-10-CM

## 2024-01-04 DIAGNOSIS — J96.01 ACUTE RESPIRATORY FAILURE WITH HYPOXIA: ICD-10-CM

## 2024-01-04 RX ORDER — ONDANSETRON 4 MG/1
4 TABLET, FILM COATED ORAL
COMMUNITY
Start: 2023-11-10

## 2024-01-04 RX ORDER — ALPRAZOLAM 0.25 MG/1
1 TABLET ORAL EVERY 12 HOURS SCHEDULED
COMMUNITY
Start: 2023-12-26

## 2024-01-31 ENCOUNTER — HOSPITAL ENCOUNTER (OUTPATIENT)
Dept: RESPIRATORY THERAPY | Facility: HOSPITAL | Age: 64
Discharge: HOME | End: 2024-01-31
Admitting: NURSE PRACTITIONER
Payer: MEDICAID

## 2024-01-31 DIAGNOSIS — J44.1 COPD EXACERBATION: ICD-10-CM

## 2024-01-31 PROCEDURE — 94729 DIFFUSING CAPACITY: CPT

## 2024-01-31 PROCEDURE — 94060 EVALUATION OF WHEEZING: CPT

## 2024-01-31 PROCEDURE — 94726 PLETHYSMOGRAPHY LUNG VOLUMES: CPT

## 2024-01-31 RX ORDER — ALBUTEROL SULFATE 2.5 MG/3ML
2.5 SOLUTION RESPIRATORY (INHALATION) EVERY 6 HOURS PRN
Status: DISCONTINUED | OUTPATIENT
Start: 2024-01-31 | End: 2024-01-31

## 2024-01-31 RX ORDER — LEVALBUTEROL INHALATION SOLUTION 1.25 MG/3ML
1.25 SOLUTION RESPIRATORY (INHALATION) ONCE
Status: COMPLETED | OUTPATIENT
Start: 2024-01-31 | End: 2024-01-31

## 2024-01-31 RX ADMIN — LEVALBUTEROL HYDROCHLORIDE 1.25 MG: 1.25 SOLUTION RESPIRATORY (INHALATION) at 13:59

## 2024-03-08 ENCOUNTER — HOSPITAL ENCOUNTER (OUTPATIENT)
Dept: CT IMAGING | Facility: HOSPITAL | Age: 64
Discharge: HOME | End: 2024-03-08
Payer: MEDICAID

## 2024-03-08 DIAGNOSIS — J96.01 ACUTE RESPIRATORY FAILURE WITH HYPOXIA: ICD-10-CM

## 2024-03-08 DIAGNOSIS — J44.1 COPD EXACERBATION: ICD-10-CM

## 2024-03-08 DIAGNOSIS — J12.1 PNEUMONIA DUE TO RESPIRATORY SYNCYTIAL VIRUS (RSV): ICD-10-CM

## 2024-03-08 PROCEDURE — 71250 CT THORAX DX C-: CPT

## 2024-03-10 DIAGNOSIS — F17.211 CIGARETTE NICOTINE DEPENDENCE IN REMISSION: Primary | ICD-10-CM

## 2024-03-20 ENCOUNTER — OFFICE VISIT (OUTPATIENT)
Dept: PULMONOLOGY | Facility: CLINIC | Age: 64
End: 2024-03-20
Payer: COMMERCIAL

## 2024-03-20 VITALS
OXYGEN SATURATION: 98 % | HEIGHT: 61 IN | DIASTOLIC BLOOD PRESSURE: 65 MMHG | WEIGHT: 205.2 LBS | RESPIRATION RATE: 18 BRPM | SYSTOLIC BLOOD PRESSURE: 151 MMHG | HEART RATE: 74 BPM | BODY MASS INDEX: 38.74 KG/M2

## 2024-03-20 DIAGNOSIS — E66.9 OBESITY (BMI 30-39.9): ICD-10-CM

## 2024-03-20 DIAGNOSIS — J44.1 COPD EXACERBATION: Primary | ICD-10-CM

## 2024-03-20 RX ORDER — POTASSIUM BICARBONATE 391 MG/1
TABLET, EFFERVESCENT ORAL
COMMUNITY
Start: 2024-03-14

## 2024-03-20 RX ORDER — MONTELUKAST SODIUM 10 MG/1
10 TABLET ORAL NIGHTLY
Qty: 30 TABLET | Refills: 11 | Status: SHIPPED | OUTPATIENT
Start: 2024-03-20

## 2024-03-20 RX ORDER — HYDROXYZINE HYDROCHLORIDE 25 MG/1
1 TABLET, FILM COATED ORAL EVERY 6 HOURS
COMMUNITY
Start: 2024-03-14

## 2024-03-20 NOTE — PROGRESS NOTES
Primary Care Provider  Andrea Salazar MD   Referring Provider  No ref. provider found    Patient Complaint  COPD, Follow-up (10 week follow up/ PFT, Chest ct results), and Shortness of Breath      SUBJECTIVE    History of Presenting Illness  Shaneka Guido is a pleasant 63 y.o. female who presents to [facility] for hospital followup. Patient was at [facility] 12/8-12/14/2023 for RSV pneumonia, COPD exacerbation, acute respiratory failure hypoxia secondary to COPD exacerbation and RSV pneumonia.  She is here for follow-up.  Since her last office visit, she has been on Stiolto once daily.  She could not take Zyrtec-D which caused her to have numbness in her face.  She is currently taking Protonix and is not taking Singulair.  She does not have issues with Singulair but Zyrtec-D caused her to have problems.  She is short of breath with activities.  She recently had CT scan of the chest which was reviewed with her today.  Her tree-in-bud opacities and endobronchial infection and inflammation has improved.  She has no significant changes in weight or appetite.  No fever or chills.  No nausea or vomiting.    I have personally reviewed the review of systems, past family, social, medical and surgical histories; and agree with their findings.    Review of Systems  Constitutional symptoms:  Denied complaints   Ear, nose, throat: Denied complaints  Cardiovascular:  Denied complaints  Respiratory: Denied complaints  Gastrointestinal: Denied complaints  Musculoskeletal: Denied complaints    Family History   Problem Relation Age of Onset    Chronic bronchitis Mother     Heart disease Mother     Hypertension Mother     Osteoporosis Mother     Cancer Father     Heart disease Father     Diabetes Father     Hypertension Sister     Osteoporosis Sister     Asthma Daughter     Hypertension Daughter     Osteoporosis Daughter     Hypertension Son     Chronic bronchitis Westley Beebe  Hyperthermia Neg Hx         Social History     Socioeconomic History    Marital status: Single   Tobacco Use    Smoking status: Former     Current packs/day: 0.00     Average packs/day: 1 pack/day for 45.0 years (45.0 ttl pk-yrs)     Types: Cigarettes     Start date: 1973     Quit date: 2018     Years since quittin.7    Smokeless tobacco: Never   Vaping Use    Vaping status: Never Used   Substance and Sexual Activity    Alcohol use: No     Comment: CAFFEINE USE: SOFT DRINKS OCCASIONALLY.     Drug use: No    Sexual activity: Defer        Past Medical History:   Diagnosis Date    Anxiety disorder     Asthma     CAD (coronary artery disease)     anterior MI , BMS to LAD, then CABG x 4 (LIMA-LAD, Y SVG-OM1-OM2, SVG-rPDA)    Chronic bronchitis     Chronic constipation     Chronic diastolic (congestive) heart failure     COPD (chronic obstructive pulmonary disease)     Eczema of face     Esophageal stricture     Essential hypertension     Generalized headaches     GERD (gastroesophageal reflux disease)     Hemorrhoids     History of tobacco use     Hyperlipidemia     Incontinence     Leg edema, left     On anticoagulant therapy     Osteoarthritis     PAD (peripheral artery disease)     Panic attacks     PTSD (post-traumatic stress disorder)     per patient     Subclavian artery stenosis, left     BP should not be checked in that arm          There is no immunization history on file for this patient.    Allergies   Allergen Reactions    Zyrtec D [Cetirizine-Pseudoephedrine Er] Swelling    Albuterol Other (See Comments)     Mouth blisters, throat swells    Amlodipine Swelling    Latex Other (See Comments)     Severe blistering, throat swells    Penicillins Other (See Comments)     Throat swells    Prednisone Other (See Comments)     Fluid retention.     Influenza Vaccines Rash          Current Outpatient Medications:     aspirin 81 MG EC tablet, Take 1 tablet by mouth Daily., Disp: , Rfl:     atorvastatin  (LIPITOR) 20 MG tablet, Take 1 tablet by mouth Daily., Disp: , Rfl:     clopidogrel (PLAVIX) 75 MG tablet, Take 1 tablet by mouth Daily., Disp: , Rfl: 2    Effer-K 10 MEQ effervescent tablet, ALLOW 1 TABLET TO DISSOLVE COMPLETELY IN 2 TO 3 OUNCES OF WATER AND THEN DRINK ONCE DAILY, Disp: , Rfl:     furosemide (LASIX) 20 MG tablet, Take 1 tablet by mouth Daily As Needed. as directed, Disp: , Rfl:     hydroCHLOROthiazide (HYDRODIURIL) 25 MG tablet, Take 1 tablet by mouth Daily., Disp: 90 tablet, Rfl: 3    hydrOXYzine (ATARAX) 25 MG tablet, Take 1 tablet by mouth Every 6 (Six) Hours., Disp: , Rfl:     isosorbide mononitrate (IMDUR) 60 MG 24 hr tablet, Take 1 tablet by mouth 2 (Two) Times a Day., Disp: , Rfl:     metoprolol tartrate (LOPRESSOR) 50 MG tablet, Take 1 tablet by mouth 2 (Two) Times a Day., Disp: 180 tablet, Rfl: 0    pantoprazole (PROTONIX) 40 MG EC tablet, Take 1 tablet by mouth Daily., Disp: , Rfl:     tiotropium bromide-olodaterol (STIOLTO RESPIMAT) 2.5-2.5 MCG/ACT aerosol solution inhaler, Inhale 2 puffs Daily., Disp: , Rfl:     ALPRAZolam (XANAX) 0.25 MG tablet, Take 1 tablet by mouth Every 12 (Twelve) Hours. (Patient not taking: Reported on 3/20/2024), Disp: , Rfl:     cetirizine-pseudoephedrine (ZyrTEC-D) 5-120 MG per 12 hr tablet, Take 1 tablet by mouth 2 (Two) Times a Day. (Patient not taking: Reported on 1/4/2024), Disp: , Rfl:     fluticasone (FLONASE) 50 MCG/ACT nasal spray, 2 sprays by Each Nare route Daily. (Patient not taking: Reported on 1/4/2024), Disp: , Rfl:     levalbuterol (XOPENEX HFA) 45 MCG/ACT inhaler, Inhale 1-2 puffs Every 4 (Four) Hours As Needed for Wheezing. (Patient not taking: Reported on 3/20/2024), Disp: , Rfl:     montelukast (SINGULAIR) 10 MG tablet, Take 1 tablet by mouth Every Night. (Patient not taking: Reported on 3/20/2024), Disp: , Rfl:     montelukast (SINGULAIR) 10 MG tablet, Take 1 tablet by mouth Every Night., Disp: 30 tablet, Rfl: 11    ondansetron (ZOFRAN) 4  "MG tablet, Take 1 tablet by mouth. (Patient not taking: Reported on 1/4/2024), Disp: , Rfl:            Vital Signs   /65 (BP Location: Right arm, Patient Position: Sitting, Cuff Size: Adult)   Pulse 74   Resp 18   Ht 154.9 cm (61\")   Wt 93.1 kg (205 lb 3.2 oz)   SpO2 98% Comment: room air  BMI 38.77 kg/m²       OBJECTIVE    Physical Exam  Vital Signs Reviewed   WDWN, Alert, in no acute distress, normal conversational  HEENT:  PERRL, EOMI.  OP, nares clear  Neck:  Supple, no JVD, no thyromegaly  Chest:  good aeration, clear to auscultation bilaterally, tympanic to percussion bilaterally, no work of breathing noted  CV: RRR, no MGR, pulses 2+, equal.  Abd:  Soft, NT, ND, + BS, no HSM  EXT:  no clubbing, no cyanosis, no edema  Neuro:  A&Ox3, CN grossly intact, no focal deficits.  Skin: No rashes or lesions noted    Results Review  I have personally reviewed the prior office notes, hospital records, labs, and diagnostics.  CT Chest Without Contrast Diagnostic [[id number]] (Order [id number])  Order  Status: Final result     Appointment Information    PACS Images     Radiology Images  Study Result    Narrative & Impression   PROCEDURE:CT CHEST WO CONTRAST DIAGNOSTIC     COMPARISON:[facility], CT, CT CHEST WO CONTRAST DIAGNOSTIC, 12/12/2023, 14:20.     INDICATIONS:abnormal CT, pneumonia, short of breath     TECHNIQUE:    CT images were created without the administration of contrast material.       PROTOCOL:     Standard imaging protocol performed                 RADIATION:      DLP: 168mGy*cm               Automated exposure control was utilized to minimize radiation dose.      FINDINGS:          Noncontrast soft tissues of the neck without acute abnormality.  Heart size normal.  Coronary   artery calcifications with postsurgical changes of sternotomy and CABG.  Negative for pericardial   effusion or pleural effusion.  Scattered mediastinal lymph nodes are below size criteria.  No   axillary " adenopathy.     Trachea and mainstem bronchi are patent.  The lungs are without consolidation.  Tree-in-bud   ground-glass nodules throughout the lungs on the prior study have nearly resolved.  Small amount of   residual tree-in-bud nodules in the periphery of the right lower lobe.  Emphysema.     Upper abdomen demonstrates normal noncontrast visualized portions of the liver, spleen, adrenal   glands, and pancreas.  The gallbladder is absent.  No free fluid in the upper abdomen.  No   aggressive osseous lesion or acute fracture.     IMPRESSION:                 1. Tree-in-bud nodules related to endobronchial infection inflammation on the prior study have   nearly completely resolved with only small residual area of tree-in-bud nodularity in the right   lower lobe.  2. Emphysema.  Recommend correlation with patient smoking history to determinate patient meets   criteria for annual low dose lung screening CT.  3. Additional chronic findings above.               NEIL ANTONY MD         Electronically Signed and Approved By: NEIL ANTONY MD on 3/08/2024 at 16:46        ASSESSMENT         Patient Active Problem List   Diagnosis    Coronary artery disease of native artery of native heart with stable angina pectoris    PAD (peripheral artery disease)    Essential hypertension    Mixed hyperlipidemia    Obesity (BMI 30-39.9)    COPD exacerbation       Encounter Diagnoses   Name Primary?    COPD exacerbation Yes    Obesity (BMI 30-39.9)      PLAN    Diagnoses and all orders for this visit:    1. COPD exacerbation (Primary)  -     montelukast (SINGULAIR) 10 MG tablet; Take 1 tablet by mouth Every Night.  Dispense: 30 tablet; Refill: 11    2. Obesity (BMI 30-39.9)  -     montelukast (SINGULAIR) 10 MG tablet; Take 1 tablet by mouth Every Night.  Dispense: 30 tablet; Refill: 11      COPD: I reviewed her previous PFT.  She has moderate COPD with FEV1 62% of predicted.  Continue with Stiolto 2 puffs once daily.  She is allergic to  albuterol.  Can use Xopenex if needed.  She quit smoking in 2018, prior to quitting smoking she was a heavy smoker.  Allergic to flu shot.  Her cardiology service told her not to take vaccinations in future.    Asthma and allergic rhinitis: Start back on Singulair once daily.  Had significant allergy with Zyrtec-D.    Smoking status: Former  Vaccination status: Declines, reports allergies to vaccinations  Medications personally reviewed    Follow Up  Return in about 6 months (around 9/20/2024).    Patient was given instructions and counseling regarding her condition or for health maintenance advice. Please see specific information pulled into the AVS if appropriate.

## 2024-03-26 RX ORDER — METOPROLOL TARTRATE 50 MG/1
50 TABLET, FILM COATED ORAL 2 TIMES DAILY
Qty: 180 TABLET | Refills: 0 | Status: SHIPPED | OUTPATIENT
Start: 2024-03-26

## 2024-04-26 PROBLEM — M79.662 BILATERAL CALF PAIN: Status: ACTIVE | Noted: 2024-04-26

## 2024-04-26 PROBLEM — M79.661 BILATERAL CALF PAIN: Status: ACTIVE | Noted: 2024-04-26

## 2024-05-16 ENCOUNTER — OFFICE VISIT (OUTPATIENT)
Age: 64
End: 2024-05-16
Payer: COMMERCIAL

## 2024-05-16 ENCOUNTER — HOSPITAL ENCOUNTER (OUTPATIENT)
Facility: HOSPITAL | Age: 64
Discharge: HOME | End: 2024-05-16
Payer: COMMERCIAL

## 2024-05-16 VITALS
HEIGHT: 61 IN | SYSTOLIC BLOOD PRESSURE: 126 MMHG | WEIGHT: 200 LBS | DIASTOLIC BLOOD PRESSURE: 64 MMHG | BODY MASS INDEX: 37.76 KG/M2

## 2024-05-16 DIAGNOSIS — I70.0 ATHEROSCLEROSIS OF AORTA: ICD-10-CM

## 2024-05-16 DIAGNOSIS — I73.9 PERIPHERAL VASCULAR DISEASE, UNSPECIFIED: ICD-10-CM

## 2024-05-16 DIAGNOSIS — I65.23 CAROTID STENOSIS, BILATERAL: ICD-10-CM

## 2024-05-16 DIAGNOSIS — I65.23 BILATERAL CAROTID ARTERY OCCLUSION: ICD-10-CM

## 2024-05-16 DIAGNOSIS — I73.9 PAD (PERIPHERAL ARTERY DISEASE): Primary | ICD-10-CM

## 2024-05-16 LAB
ABDOMINAL AORTA AORTIC ANASTOMOSIS PSV: 105 CM/S
ABDOMINAL AORTA AORTIC GRAFT BODY PSV: 113.6 CM/S
ABDOMINAL AORTA LEFT DIST ANASTOMOSIS PSV: 230.7 CM/S
ABDOMINAL AORTA RIGHT DIST ANASTOMOSIS PSV: 140.1 CM/S
ABDOMINAL AORTA RIGHT LIMB GRAFT PSV: 146.4 CM/S
ABDOMINAL PROX AORTA AP: 1.88 CM
ABDOMINAL PROX AORTA VEL: 69.7 CM/S
BH CV LOWER ARTERIAL LEFT ABI RATIO: 0.81
BH CV LOWER ARTERIAL LEFT DORSALIS PEDIS SYS MAX: 102
BH CV LOWER ARTERIAL LEFT POST TIBIAL SYS MAX: 104
BH CV LOWER ARTERIAL RIGHT ABI RATIO: 0.8
BH CV LOWER ARTERIAL RIGHT DORSALIS PEDIS SYS MAX: 96
BH CV LOWER ARTERIAL RIGHT POST TIBIAL SYS MAX: 102
BH CV VAS ABDOMINAL AORTA DISTAL LEFT LIMB GRAFT PSV: 167.9 CM/S
BH CV VAS ABDOMINAL AORTA DISTAL RIGHT LIMB GRAFT PSV: 151.2 CM/S
BH CV XLRA MEAS LEFT CAROTID BULB EDV: 25.2 CM/SEC
BH CV XLRA MEAS LEFT CAROTID BULB PSV: 144.3 CM/SEC
BH CV XLRA MEAS LEFT DIST CCA EDV: -24.3 CM/SEC
BH CV XLRA MEAS LEFT DIST CCA PSV: -132.6 CM/SEC
BH CV XLRA MEAS LEFT DIST ICA EDV: -33.9 CM/SEC
BH CV XLRA MEAS LEFT DIST ICA PSV: -152 CM/SEC
BH CV XLRA MEAS LEFT ICA/CCA RATIO: 1.09
BH CV XLRA MEAS LEFT MID CCA EDV: 21.7 CM/SEC
BH CV XLRA MEAS LEFT MID CCA PSV: 123.9 CM/SEC
BH CV XLRA MEAS LEFT MID ICA EDV: -32 CM/SEC
BH CV XLRA MEAS LEFT MID ICA PSV: -137.5 CM/SEC
BH CV XLRA MEAS LEFT PROX CCA EDV: 20.8 CM/SEC
BH CV XLRA MEAS LEFT PROX CCA PSV: 116.1 CM/SEC
BH CV XLRA MEAS LEFT PROX ECA PSV: -228.9 CM/SEC
BH CV XLRA MEAS LEFT PROX ICA EDV: -28 CM/SEC
BH CV XLRA MEAS LEFT PROX ICA PSV: -107.2 CM/SEC
BH CV XLRA MEAS LEFT PROX SCLA PSV: 153.7 CM/SEC
BH CV XLRA MEAS LEFT VERTEBRAL A PSV: 68 CM/SEC
BH CV XLRA MEAS RIGHT CAROTID BULB EDV: -76.1 CM/SEC
BH CV XLRA MEAS RIGHT CAROTID BULB PSV: -300.5 CM/SEC
BH CV XLRA MEAS RIGHT DIST CCA EDV: -24.5 CM/SEC
BH CV XLRA MEAS RIGHT DIST CCA PSV: -117.7 CM/SEC
BH CV XLRA MEAS RIGHT DIST ICA EDV: -28.5 CM/SEC
BH CV XLRA MEAS RIGHT DIST ICA PSV: -166.8 CM/SEC
BH CV XLRA MEAS RIGHT ICA/CCA RATIO: 3.18
BH CV XLRA MEAS RIGHT MID CCA EDV: 18.2 CM/SEC
BH CV XLRA MEAS RIGHT MID CCA PSV: 88.3 CM/SEC
BH CV XLRA MEAS RIGHT MID ICA EDV: 40.7 CM/SEC
BH CV XLRA MEAS RIGHT MID ICA PSV: 263.4 CM/SEC
BH CV XLRA MEAS RIGHT PROX CCA EDV: 14.9 CM/SEC
BH CV XLRA MEAS RIGHT PROX CCA PSV: 106.6 CM/SEC
BH CV XLRA MEAS RIGHT PROX ECA EDV: -20.6 CM/SEC
BH CV XLRA MEAS RIGHT PROX ECA PSV: -220.9 CM/SEC
BH CV XLRA MEAS RIGHT PROX ICA EDV: -67.8 CM/SEC
BH CV XLRA MEAS RIGHT PROX ICA PSV: -373.8 CM/SEC
BH CV XLRA MEAS RIGHT PROX SCLA PSV: 278 CM/SEC
BH CV XLRA MEAS RIGHT VERTEBRAL A EDV: -22 CM/SEC
BH CV XLRA MEAS RIGHT VERTEBRAL A PSV: -153.7 CM/SEC
LEFT ARM BP: NORMAL MMHG
LEFT GROIN CFA SYS: 233.8 CM/SEC
RIGHT ARM BP: NORMAL MMHG
UPPER ARTERIAL LEFT ARM BRACHIAL SYS MAX: NORMAL
UPPER ARTERIAL RIGHT ARM BRACHIAL SYS MAX: NORMAL

## 2024-05-16 PROCEDURE — 93978 VASCULAR STUDY: CPT

## 2024-05-16 PROCEDURE — 93922 UPR/L XTREMITY ART 2 LEVELS: CPT

## 2024-05-16 PROCEDURE — 93880 EXTRACRANIAL BILAT STUDY: CPT

## 2024-05-16 NOTE — PROGRESS NOTES
Chief Complaint  Carotid Artery Disease and Peripheral Vascular Disease    Subjective        Shaneka Guido presents to [facility] VASCULAR SURGERY  HPI   Shaneka Guido is a 63 y.o. female that has been followed in our office for carotid artery stenosis and peripheral arterial disease.  In 1994 patient had an aortobi-iliac graft.  In 2000, she had a right limb revision.  On 6/25/2019 bilateral PFA angioplasties with drug-eluting balloons.  She returns today in follow up along with a carotid duplex, ABIs, and bypass graft scan.  She  reports she was hospitalized in December for RSV with residual dyspnea. She reports occasional dizziness. She denies any symptoms consistent with CVA, TIA, or amaurosis fugax. She  denies any worsening claudication symptoms, rest pain, or tissue loss including left arm claudication.     Review of Systems   Constitutional:  Negative for fever.   Eyes:  Negative for visual disturbance.   Cardiovascular:  Negative for leg swelling.   Gastrointestinal:  Negative for abdominal pain.   Musculoskeletal:  Negative for back pain.   Skin:  Negative for color change, pallor and wound.   Neurological:  Negative for dizziness, facial asymmetry, speech difficulty and weakness.        Shaneka Guido  reports that she quit smoking about 5 years ago. Her smoking use included cigarettes. She started smoking about 50 years ago. She has a 45 pack-year smoking history. She has never used smokeless tobacco..        Objective   Vital Signs:  Vitals:    05/16/24 1117   BP: 126/64      Body mass index is 37.79 kg/m².   Class 2 Severe Obesity (BMI >=35 and <=39.9). Obesity-related health conditions include the following: dyslipidemias. Obesity is improving with lifestyle modifications. BMI is is above average; BMI management plan is completed. We discussed portion control, increasing exercise, and Information on healthy weight added to patient's after visit summary.       Physical  Exam  Vitals reviewed.   Constitutional:       Appearance: Normal appearance.   HENT:      Head: Normocephalic.   Cardiovascular:      Rate and Rhythm: Normal rate and regular rhythm.      Pulses: Normal pulses.           Dorsalis pedis pulses are detected w/ Doppler on the right side and detected w/ Doppler on the left side.        Posterior tibial pulses are detected w/ Doppler on the right side and detected w/ Doppler on the left side.   Pulmonary:      Effort: Pulmonary effort is normal.   Skin:     General: Skin is warm.   Neurological:      General: No focal deficit present.      Mental Status: She is alert and oriented to person, place, and time.   Psychiatric:         Mood and Affect: Mood normal.          Result Review :      Previous carotid duplex: 50 to 69% stenosis on the right, less than 50% stenosis on the left    Carotid duplex from today: 50 to 69% stenosis on the right, less than 50% stenosis on the left    Previous ABIs: 0.83 on the right and 0.75 on the left    ABIs today: 0.8 on the right and 0.81 on the left    Patent aortobiiliac bypass graft                   Assessment and Plan     Diagnoses and all orders for this visit:    1. PAD (peripheral artery disease) (Primary)  -     Doppler Ankle Brachial Index Single Level CAR; Future  -     Duplex Aorta IVC Iliac Graft Complete CAR; Future    2. Carotid stenosis, bilateral  -     Duplex Carotid Ultrasound CAR; Future             Patient presents today for ongoing management of her  carotid artery stenosis and peripheral arterial disease. She is to continue her antiplatelet agent which is Plavix, and aspirin. She is on a statin for cholesterol control.  We discussed adequate blood pressure control. She will return in 6 months along with a repeat carotid artery duplex, ABIs, and graft scan.    Follow Up     Return in about 6 months (around 11/16/2024) for carotid duplex, adriana, aortic duplex.  Patient was given instructions and counseling regarding  her condition or for health maintenance advice. Please see specific information pulled into the AVS if appropriate.     CLIFFORD Morelos

## 2024-05-16 NOTE — PATIENT INSTRUCTIONS
"\"5-6-3-Almost None!\"  Healthy Habits Start Early    EAT 5 OR MORE SERVINGS OF VEGETABLES AND FRUITS EVERY DAY.    Help Shaneka get three vegetables and two fruits each day. Red, green, yellow, orange...encourage them to try all the colors so they can enjoy different flavors and get more vitamins.    How can I help Shaneka do this?  ---------------------------------------------  -BE PATIENT WITH Shaneka, remember it may take 10 times before they start to like new food. So, start with small bites and just keep trying.  -Serve at least one vegetable or fruit at every meal. Even try two. Remember, portions do not have to be as big as you think.  -Encourage eating fruits and vegetables instead of drinking them..it's a better way to get fiber and vitamins..so limit the amount of juice to 1/2 cup per day for children 1-6 years and one cup per day for children 7-18 years of age. Try using 1/2 part water and 1/2 part juice.    Spend less than two hours per day watching television and other screen media. Screen media includes video games, movies and computer use for entertainment.    How can I help Shaneka do this?  -Turn off the TV at dinner. Dinner is the best time to hang out with your kids and just talk, learn about their day, and tell them about your day. Your kids have a lot to learn from you and dinner is a great time to share.  "

## 2024-06-28 RX ORDER — METOPROLOL TARTRATE 50 MG/1
50 TABLET, FILM COATED ORAL 2 TIMES DAILY
Qty: 180 TABLET | Refills: 0 | Status: SHIPPED | OUTPATIENT
Start: 2024-06-28

## 2024-08-09 NOTE — PROGRESS NOTES
RM:________     PCP: Andrea Salazar MD    : 1960  AGE: 64 y.o.  EST PATIENT     REASON FOR VISIT/  CC:        BP Readings from Last 3 Encounters:   24 126/64   24 151/65   24 124/75      Wt Readings from Last 3 Encounters:   24 90.7 kg (200 lb)   24 93.1 kg (205 lb 3.2 oz)   24 92.5 kg (204 lb)        WT: ____________ BP: __________L __________R HR______    CHEST PAIN: _____________    SOA: _____________PALPS: _______________     LIGHTHEADED: ___________FATIGUE: ________________ EDEMA __________    ALLERGIES:Zyrtec d [cetirizine-pseudoephedrine er], Albuterol, Amlodipine, Latex, Penicillins, Prednisone, and Influenza vaccines SMOKING HISTORY:  Social History     Tobacco Use    Smoking status: Former     Current packs/day: 0.00     Average packs/day: 1 pack/day for 45.0 years (45.0 ttl pk-yrs)     Types: Cigarettes     Start date: 1973     Quit date: 2018     Years since quittin.1    Smokeless tobacco: Never   Vaping Use    Vaping status: Never Used   Substance Use Topics    Alcohol use: No     Comment: CAFFEINE USE: SOFT DRINKS OCCASIONALLY.     Drug use: No     CAFFEINE USE_________________  ALCOHOL ______________________

## 2024-08-14 ENCOUNTER — OFFICE VISIT (OUTPATIENT)
Dept: CARDIOLOGY | Facility: CLINIC | Age: 64
End: 2024-08-14
Payer: COMMERCIAL

## 2024-08-14 VITALS
HEART RATE: 75 BPM | HEIGHT: 61 IN | WEIGHT: 210 LBS | DIASTOLIC BLOOD PRESSURE: 70 MMHG | SYSTOLIC BLOOD PRESSURE: 112 MMHG | BODY MASS INDEX: 39.65 KG/M2

## 2024-08-14 DIAGNOSIS — I73.9 PAD (PERIPHERAL ARTERY DISEASE): ICD-10-CM

## 2024-08-14 DIAGNOSIS — E78.2 MIXED HYPERLIPIDEMIA: ICD-10-CM

## 2024-08-14 DIAGNOSIS — I50.32 CHRONIC HEART FAILURE WITH PRESERVED EJECTION FRACTION (HFPEF): ICD-10-CM

## 2024-08-14 DIAGNOSIS — I10 ESSENTIAL HYPERTENSION: ICD-10-CM

## 2024-08-14 DIAGNOSIS — I51.9 LEFT VENTRICULAR SYSTOLIC DYSFUNCTION: ICD-10-CM

## 2024-08-14 DIAGNOSIS — I25.118 CORONARY ARTERY DISEASE OF NATIVE ARTERY OF NATIVE HEART WITH STABLE ANGINA PECTORIS: Primary | ICD-10-CM

## 2024-08-14 PROBLEM — J44.1 COPD EXACERBATION: Status: RESOLVED | Noted: 2023-12-08 | Resolved: 2024-08-14

## 2024-08-14 NOTE — PROGRESS NOTES
"Date of Office Visit: 2024  Encounter Provider: Demond Owen MD  Place of Service: [facility] CARDIOLOGY  Patient Name: Shaneka Guido  :1960    Chief Complaint   Patient presents with    Coronary artery disease of native artery of native heart wi   :   HPI: Shaneka Guido is a 64 y.o. female who presents today in follow up. I have reviewed prior notes and there are no changes except for any new updates described below. I have also reviewed any information entered into the medical record by the patient or by ancillary staff.     She established care with me in 2019.  She has lived in many cities and even in [place], and has seen cardiologists at [place], in [place], and in [place].      All of her siblings and her mother have had severe CAD and PAD.  At the age of 35, she underwent aortobifemoral bypass.  At age 45, she woke up with an acute ischemic right leg and required urgent surgical revascularization.  She had an arterial doppler in  that reported left subclavian stenosis; she was unaware of this diagnosis, but did say that her blood pressure shouldn't be checked in that arm as it's \"too low.\"  She has a history of carotid arterial disease as well .    In , she presented with chest pain that she described as a feeling of \"ice\" in her chest.  She was found to have an acute anterior MI.  She underwent emergent BMS to the LAD and then underwent CABG the next day (details are in HPI).  Her LVEF was 50%.  She has had angiograms since then, but they've been difficult due to access issues.    An echo in  reported an LVEF of 40-45% with regional wall motion abnormalities (although it did not report which walls were abnormal) and normal valves.      When I met her, she reported recurrence of the sensation of \"ice\" in her chest. A PET stress reported a large amount of inferior ischemia.  She then underwent coronary angiography in May 2019.  The " vein grafts to the OM1, OM2, Cx, and RCA were all occluded, as was the native RCA.  The LAD had mild diffuse disease and a patent stent, and the LIMA backfilled the LAD.  The proximal subclavian was totally occluded.   She was started on ranolazine but had to stop it due to lightheadedness and fatigue. I then ordered amlodipine but she opted not to take it. She then established care with Dr. Landin.  She underwent bilateral angioplasty of the profunda femoral arteries.  She had carotid studies which showed 60-70% stenosis on the right and 50-60% stenosis on the left with retrograde vertebral filling consistent with known subclavian occlusion.    She was admitted to [facility] in December 2023 for AECOPD/RSV. She had an echocardiogram and I have not been aware of those results until today. It reported an LVEF of 35-40%, grade 1A diastolic dysfunction, and moderate PHTN.    Her cardiac symptoms are stable and her claudication has improved. She denies chest pain.  She has chronic mild exertional dyspnea and chronic fatigue.     Past Medical History:   Diagnosis Date    Anxiety disorder     Arm pain, right     Forearm    Asthma     Atherosclerosis of native arteries of extremities with intermittent claudication, bilateral legs 11/03/2021    Atherosclerotic heart disease of native coronary artery with other forms of angina pectoris     Bilateral carotid artery stenosis 11/03/2021    CAD (coronary artery disease)     anterior MI 2012, BMS to LAD, then CABG x 4 (LIMA-LAD, Y SVG-OM1-OM2, SVG-rPDA)    Chronic bronchitis     Chronic constipation     Chronic heart failure with preserved ejection fraction (HFpEF)     COPD (chronic obstructive pulmonary disease)     Eczema of face     Esophageal stricture     Essential hypertension     Generalized headaches     GERD (gastroesophageal reflux disease)     Hemorrhoids     History of mammogram 07/2020    History of tobacco use     Hyperlipidemia     Incontinence     Leg edema, left     On  anticoagulant therapy     Osteoarthritis     PAD (peripheral artery disease)     Panic attacks     Peripheral vascular disease, unspecified     PTSD (post-traumatic stress disorder)     per patient     Stenosis of other vascular prosthetic devices, implants and grafts, subsequent encounter 2021    Subclavian artery stenosis, left 2021    BP should not be checked in that arm       Past Surgical History:   Procedure Laterality Date    ANGIOPLASTY      AORTA ILIAC BYPASS  2004    right side/10/1994 both sides    BRONCHOSCOPY      CARDIAC CATHETERIZATION N/A 2019    Procedure: Coronary angiography;  Surgeon: Nickie Zhu MD;  Location:  [facility];  Service: Cardiovascular    CARDIAC CATHETERIZATION N/A 2019    Procedure: Left heart cath;  Surgeon: Nickie Zhu MD;  Location:  [facility];  Service: Cardiovascular    CARDIAC CATHETERIZATION N/A 2019    Procedure: Left ventriculography;  Surgeon: Nickie Zhu MD;  Location:  [facility];  Service: Cardiovascular    CARDIAC CATHETERIZATION N/A 2019    Procedure: Bypass graft study;  Surgeon: Nickie Zhu MD;  Location:  [facility];  Service: Cardiovascular    CARDIAC CATHETERIZATION  2012    CARDIAC CATHETERIZATION  2005    CARDIAC CATHETERIZATION  2004    CARDIAC CATHETERIZATION  1994    CAROTID STENT      CHOLECYSTECTOMY  2014    CORONARY ARTERY BYPASS GRAFT      CORONARY ARTERY BYPASS GRAFT  2012    HYSTERECTOMY  1997    LAPAROSCOPIC TUBAL LIGATION      TRIGGER FINGER RELEASE  11/10/2023       Social History     Socioeconomic History    Marital status: Single   Tobacco Use    Smoking status: Former     Current packs/day: 0.00     Average packs/day: 1 pack/day for 45.0 years (45.0 ttl pk-yrs)     Types: Cigarettes     Start date: 1973     Quit date: 2018     Years since quittin.1    Smokeless tobacco: Never   Vaping  Use    Vaping status: Never Used   Substance and Sexual Activity    Alcohol use: No     Comment: CAFFEINE USE: SOFT DRINKS OCCASIONALLY.     Drug use: No    Sexual activity: Defer       Family History   Problem Relation Age of Onset    Chronic bronchitis Mother     Heart disease Mother     Hypertension Mother     Osteoporosis Mother     Stroke Mother     Cancer Father     Heart disease Father     Diabetes Father     Lung cancer Father     Hypertension Sister     Osteoporosis Sister     Other Brother         blood clots    Asthma Daughter     Hypertension Daughter     Osteoporosis Daughter     Hypertension Son     Chronic bronchitis Son     Stroke Other     Cancer Other     Malig Hyperthermia Neg Hx        Review of Systems   Constitutional: Positive for malaise/fatigue.   Cardiovascular:  Positive for claudication, dyspnea on exertion and leg swelling.   Respiratory:  Positive for shortness of breath.    Endocrine: Positive for cold intolerance and heat intolerance.   Musculoskeletal:  Positive for joint pain and myalgias.   Neurological:  Positive for excessive daytime sleepiness and headaches.   Psychiatric/Behavioral:  Positive for depression. The patient is nervous/anxious.    All other systems reviewed and are negative.      Allergies   Allergen Reactions    Zyrtec D [Cetirizine-Pseudoephedrine Er] Swelling    Albuterol Other (See Comments)     Mouth blisters, throat swells    Amlodipine Swelling    Latex Other (See Comments)     Severe blistering, throat swells    Penicillins Other (See Comments)     Throat swells    Prednisone Other (See Comments)     Fluid retention.     Influenza Vaccines Rash       Current Outpatient Medications:     aspirin 81 MG EC tablet, Take 1 tablet by mouth Daily., Disp: , Rfl:     atorvastatin (LIPITOR) 20 MG tablet, Take 1 tablet by mouth Daily., Disp: , Rfl:     clopidogrel (PLAVIX) 75 MG tablet, Take 1 tablet by mouth Daily., Disp: , Rfl: 2    Effer-K 10 MEQ effervescent tablet,  "ALLOW 1 TABLET TO DISSOLVE COMPLETELY IN 2 TO 3 OUNCES OF WATER AND THEN DRINK ONCE DAILY, Disp: , Rfl:     furosemide (LASIX) 20 MG tablet, Take 1 tablet by mouth Daily As Needed. as directed, Disp: , Rfl:     hydroCHLOROthiazide (HYDRODIURIL) 25 MG tablet, Take 1 tablet by mouth Daily., Disp: 90 tablet, Rfl: 3    hydrOXYzine (ATARAX) 25 MG tablet, Take 1 tablet by mouth Every 6 (Six) Hours., Disp: , Rfl:     isosorbide mononitrate (IMDUR) 60 MG 24 hr tablet, Take 1 tablet by mouth 2 (Two) Times a Day., Disp: , Rfl:     metoprolol tartrate (LOPRESSOR) 50 MG tablet, TAKE 1 TABLET BY MOUTH TWICE DAILY, Disp: 180 tablet, Rfl: 0    montelukast (SINGULAIR) 10 MG tablet, Take 1 tablet by mouth Every Night., Disp: 30 tablet, Rfl: 11    pantoprazole (PROTONIX) 40 MG EC tablet, Take 1 tablet by mouth Daily., Disp: , Rfl:     ALPRAZolam (XANAX) 0.25 MG tablet, Take 1 tablet by mouth Every 12 (Twelve) Hours. (Patient not taking: Reported on 3/20/2024), Disp: , Rfl:     raNITIdine (ZANTAC) 150 MG tablet, Take 1 tablet by mouth As Needed. (Patient not taking: Reported on 8/14/2024), Disp: , Rfl:     ranolazine (RANEXA) 500 MG 12 hr tablet, Take 1 tablet by mouth 2 (Two) Times a Day. (Patient not taking: Reported on 8/14/2024), Disp: , Rfl:     tiotropium bromide-olodaterol (STIOLTO RESPIMAT) 2.5-2.5 MCG/ACT aerosol solution inhaler, Inhale 2 puffs Daily. (Patient not taking: Reported on 8/14/2024), Disp: , Rfl:       Objective:     Vitals:    08/14/24 0942   BP: 112/70   BP Location: Right arm   Patient Position: Sitting   Cuff Size: Large Adult   Pulse: 75   Weight: 95.3 kg (210 lb)   Height: 154.9 cm (61\")       Body mass index is 39.68 kg/m².    Physical Exam  Vitals reviewed.   Constitutional:       Comments: Obese   HENT:      Head: Normocephalic.      Nose: Nose normal.      Mouth/Throat:      Dentition: Abnormal dentition.   Eyes:      Conjunctiva/sclera: Conjunctivae normal.   Neck:      Vascular: No JVD.      Comments: " Cannot assess for JVD due to habitus  Cardiovascular:      Rate and Rhythm: Normal rate and regular rhythm.      Pulses: Decreased pulses.      Heart sounds: Normal heart sounds. No murmur heard.  Pulmonary:      Effort: Pulmonary effort is normal.      Breath sounds: Normal breath sounds.   Abdominal:      Palpations: Abdomen is soft.      Tenderness: There is no abdominal tenderness.      Comments: Obesity limits abdominal exam   Musculoskeletal:         General: No swelling. Normal range of motion.      Cervical back: Normal range of motion.   Skin:     General: Skin is warm and dry.   Neurological:      General: No focal deficit present.      Mental Status: She is alert.   Psychiatric:         Mood and Affect: Mood normal.         Behavior: Behavior normal.         Thought Content: Thought content normal.           ECG 12 Lead    Date/Time: 8/14/2024 10:08 AM  Performed by: Demond Owen MD    Authorized by: Demond Owen MD  Comparison: compared with previous ECG   Similar to previous ECG  Rhythm: sinus rhythm  Rate: normal  Conduction: conduction normal  ST Segments: ST segments normal  T inversion: II, III, aVF, V6 and V5  T flattening: I and aVL  QRS axis: normal  Other findings: left atrial abnormality    Clinical impression: abnormal EKG          Assessment:       Diagnosis Plan   1. Coronary artery disease of native artery of native heart with stable angina pectoris  ECG 12 Lead      2. Mixed hyperlipidemia        3. Left ventricular systolic dysfunction  Adult Transthoracic Echo Complete W/ Cont if Necessary Per Protocol      4. Chronic heart failure with preserved ejection fraction (HFpEF)  Adult Transthoracic Echo Complete W/ Cont if Necessary Per Protocol      5. Essential hypertension        6. PAD (peripheral artery disease)          Plan:       1/2.  Coronary Artery Disease  Assessment   The patient has CCS class I - angina only during strenuous or prolonged physical activity    Subjective -  Objective   There is a history of past MI  2012   There is a history of previous coronary artery bypass graft  2012   There has been a previous stent procedure using BMS  2012   Current antiplatelet therapy includes aspirin 81 mg and clopidogrel 75 mg    She's on clopidogrel and aspirin due to to PAD, and is on atorvastatin.  She's on metoprolol, ranolazine, and ISMN. All of her grafts to her RCA/LCx are occluded, as are the native vessels; she is living off a LIMA that is supplied by an occluded left subclavian (so she's stealing from her vertebrals), but her native LAD is patent as well. She had previously declined amlodipine as it has previously caused edema.    As below, I'm going to recheck her EF. If it's low, we may have to reassess our revascularization options.     3/4. She has a history of chronic HFpEF, but in December 2023, while hospitalized elsewhere, an echo reported moderate LV systolic dysfunction! I've just become aware of this today. She's euvolemic and her symptoms are stable. I'm going to repeat an echo. She's on HCTZ, furosemide, and metoprolol. If her EF remains low, we'll have to consider ACE/ARB.    5. Her BP is well controlled.    6.  She has complex vascular disease which is followed by Dr. Landin. She's on DAPT and atorvastatin.      Sincerely,       Demond Owen MD

## 2024-08-21 ENCOUNTER — HOSPITAL ENCOUNTER (OUTPATIENT)
Dept: CARDIOLOGY | Facility: HOSPITAL | Age: 64
Discharge: HOME | End: 2024-08-21
Admitting: INTERNAL MEDICINE
Payer: COMMERCIAL

## 2024-08-21 VITALS
DIASTOLIC BLOOD PRESSURE: 42 MMHG | WEIGHT: 210 LBS | SYSTOLIC BLOOD PRESSURE: 98 MMHG | HEIGHT: 61 IN | BODY MASS INDEX: 39.65 KG/M2

## 2024-08-21 DIAGNOSIS — I50.32 CHRONIC HEART FAILURE WITH PRESERVED EJECTION FRACTION (HFPEF): ICD-10-CM

## 2024-08-21 DIAGNOSIS — I51.9 LEFT VENTRICULAR SYSTOLIC DYSFUNCTION: ICD-10-CM

## 2024-08-21 LAB
AORTIC DIMENSIONLESS INDEX: 0.8 (DI)
BH CV ECHO MEAS - ACS: 1.58 CM
BH CV ECHO MEAS - AO MAX PG: 3.2 MMHG
BH CV ECHO MEAS - AO MEAN PG: 1.89 MMHG
BH CV ECHO MEAS - AO ROOT DIAM: 3 CM
BH CV ECHO MEAS - AO V2 MAX: 89.3 CM/SEC
BH CV ECHO MEAS - AO V2 VTI: 21.2 CM
BH CV ECHO MEAS - AVA(I,D): 2.09 CM2
BH CV ECHO MEAS - EDV(CUBED): 117 ML
BH CV ECHO MEAS - EDV(MOD-SP2): 94 ML
BH CV ECHO MEAS - EDV(MOD-SP4): 98 ML
BH CV ECHO MEAS - EF(MOD-BP): 53.6 %
BH CV ECHO MEAS - EF(MOD-SP2): 52.1 %
BH CV ECHO MEAS - EF(MOD-SP4): 52 %
BH CV ECHO MEAS - ESV(CUBED): 45 ML
BH CV ECHO MEAS - ESV(MOD-SP2): 45 ML
BH CV ECHO MEAS - ESV(MOD-SP4): 47 ML
BH CV ECHO MEAS - FS: 27.3 %
BH CV ECHO MEAS - IVS/LVPW: 1.03 CM
BH CV ECHO MEAS - IVSD: 1.2 CM
BH CV ECHO MEAS - LAT PEAK E' VEL: 7.2 CM/SEC
BH CV ECHO MEAS - LV DIASTOLIC VOL/BSA (35-75): 50.8 CM2
BH CV ECHO MEAS - LV MASS(C)D: 221.8 GRAMS
BH CV ECHO MEAS - LV MAX PG: 2.15 MMHG
BH CV ECHO MEAS - LV MEAN PG: 1.04 MMHG
BH CV ECHO MEAS - LV SYSTOLIC VOL/BSA (12-30): 24.4 CM2
BH CV ECHO MEAS - LV V1 MAX: 73.2 CM/SEC
BH CV ECHO MEAS - LV V1 VTI: 16.2 CM
BH CV ECHO MEAS - LVIDD: 4.9 CM
BH CV ECHO MEAS - LVIDS: 3.6 CM
BH CV ECHO MEAS - LVOT AREA: 2.7 CM2
BH CV ECHO MEAS - LVOT DIAM: 1.86 CM
BH CV ECHO MEAS - LVPWD: 1.17 CM
BH CV ECHO MEAS - MED PEAK E' VEL: 6 CM/SEC
BH CV ECHO MEAS - MR MAX PG: 40.9 MMHG
BH CV ECHO MEAS - MR MAX VEL: 319.7 CM/SEC
BH CV ECHO MEAS - MV A DUR: 0.11 SEC
BH CV ECHO MEAS - MV A MAX VEL: 77.1 CM/SEC
BH CV ECHO MEAS - MV DEC SLOPE: 344.7 CM/SEC2
BH CV ECHO MEAS - MV DEC TIME: 0.18 SEC
BH CV ECHO MEAS - MV E MAX VEL: 93.1 CM/SEC
BH CV ECHO MEAS - MV E/A: 1.21
BH CV ECHO MEAS - MV MAX PG: 3.5 MMHG
BH CV ECHO MEAS - MV MEAN PG: 1.94 MMHG
BH CV ECHO MEAS - MV P1/2T: 81.1 MSEC
BH CV ECHO MEAS - MV V2 VTI: 28.3 CM
BH CV ECHO MEAS - MVA(P1/2T): 2.7 CM2
BH CV ECHO MEAS - MVA(VTI): 1.56 CM2
BH CV ECHO MEAS - PA ACC TIME: 0.11 SEC
BH CV ECHO MEAS - PA V2 MAX: 75.2 CM/SEC
BH CV ECHO MEAS - PULM A REVS DUR: 0.1 SEC
BH CV ECHO MEAS - PULM A REVS VEL: 25.7 CM/SEC
BH CV ECHO MEAS - PULM DIAS VEL: 34.9 CM/SEC
BH CV ECHO MEAS - PULM S/D: 1.04
BH CV ECHO MEAS - PULM SYS VEL: 36.4 CM/SEC
BH CV ECHO MEAS - RAP SYSTOLE: 3 MMHG
BH CV ECHO MEAS - RV MAX PG: 1.79 MMHG
BH CV ECHO MEAS - RV V1 MAX: 66.8 CM/SEC
BH CV ECHO MEAS - RV V1 VTI: 12.9 CM
BH CV ECHO MEAS - RVSP: 14 MMHG
BH CV ECHO MEAS - SV(LVOT): 44.1 ML
BH CV ECHO MEAS - SV(MOD-SP2): 49 ML
BH CV ECHO MEAS - SV(MOD-SP4): 51 ML
BH CV ECHO MEAS - SVI(LVOT): 22.9 ML/M2
BH CV ECHO MEAS - SVI(MOD-SP2): 25.4 ML/M2
BH CV ECHO MEAS - SVI(MOD-SP4): 26.4 ML/M2
BH CV ECHO MEAS - TAPSE (>1.6): 1.38 CM
BH CV ECHO MEAS - TR MAX PG: 11.1 MMHG
BH CV ECHO MEAS - TR MAX VEL: 166.8 CM/SEC
BH CV ECHO MEASUREMENTS AVERAGE E/E' RATIO: 14.11
BH CV XLRA - RV BASE: 2.7 CM
BH CV XLRA - RV LENGTH: 6.2 CM
BH CV XLRA - RV MID: 1.39 CM
BH CV XLRA - TDI S': 9.2 CM/SEC
LEFT ATRIUM VOLUME INDEX: 19.2 ML/M2
SINUS: 2.5 CM

## 2024-08-21 PROCEDURE — 25510000001 PERFLUTREN 6.52 MG/ML SUSPENSION 2 ML VIAL: Performed by: INTERNAL MEDICINE

## 2024-08-21 PROCEDURE — 93306 TTE W/DOPPLER COMPLETE: CPT

## 2024-08-21 RX ADMIN — PERFLUTREN 3 ML: 6.52 INJECTION, SUSPENSION INTRAVENOUS at 11:33

## 2024-08-21 NOTE — PROGRESS NOTES
Please arrange one year f/u with KANNAN porter    I called -- EF is normal, and that WMA is expected.

## 2024-09-18 ENCOUNTER — OFFICE VISIT (OUTPATIENT)
Dept: PULMONOLOGY | Facility: CLINIC | Age: 64
End: 2024-09-18
Payer: COMMERCIAL

## 2024-09-18 VITALS
HEIGHT: 61 IN | TEMPERATURE: 97.5 F | OXYGEN SATURATION: 97 % | BODY MASS INDEX: 39.82 KG/M2 | SYSTOLIC BLOOD PRESSURE: 123 MMHG | DIASTOLIC BLOOD PRESSURE: 73 MMHG | RESPIRATION RATE: 16 BRPM | WEIGHT: 210.9 LBS | HEART RATE: 68 BPM

## 2024-09-18 DIAGNOSIS — F17.210 SMOKING GREATER THAN 30 PACK YEARS: ICD-10-CM

## 2024-09-18 DIAGNOSIS — I50.32 CHRONIC HEART FAILURE WITH PRESERVED EJECTION FRACTION (HFPEF): Primary | ICD-10-CM

## 2024-09-18 DIAGNOSIS — E66.9 OBESITY (BMI 30-39.9): ICD-10-CM

## 2024-09-18 PROCEDURE — 1160F RVW MEDS BY RX/DR IN RCRD: CPT | Performed by: INTERNAL MEDICINE

## 2024-09-18 PROCEDURE — 1159F MED LIST DOCD IN RCRD: CPT | Performed by: INTERNAL MEDICINE

## 2024-09-18 PROCEDURE — 3074F SYST BP LT 130 MM HG: CPT | Performed by: INTERNAL MEDICINE

## 2024-09-18 PROCEDURE — 3078F DIAST BP <80 MM HG: CPT | Performed by: INTERNAL MEDICINE

## 2024-09-18 PROCEDURE — 99214 OFFICE O/P EST MOD 30 MIN: CPT | Performed by: INTERNAL MEDICINE

## 2024-09-18 RX ORDER — LEVALBUTEROL INHALATION SOLUTION 1.25 MG/3ML
1 SOLUTION RESPIRATORY (INHALATION) EVERY 4 HOURS PRN
COMMUNITY

## 2024-10-01 RX ORDER — METOPROLOL TARTRATE 50 MG
50 TABLET ORAL 2 TIMES DAILY
Qty: 180 TABLET | Refills: 0 | Status: SHIPPED | OUTPATIENT
Start: 2024-10-01

## 2024-11-27 RX ORDER — HYDROCHLOROTHIAZIDE 25 MG/1
25 TABLET ORAL DAILY
Qty: 90 TABLET | Refills: 3 | Status: SHIPPED | OUTPATIENT
Start: 2024-11-27

## (undated) DEVICE — RADIFOCUS GLIDEWIRE: Brand: GLIDEWIRE

## (undated) DEVICE — VESSEL LOOPS X-RAY DETECTABLE: Brand: DEROYAL

## (undated) DEVICE — PENCL E/S HNDSWCH ROCKR CB

## (undated) DEVICE — GAUZE,SPONGE,4"X4",16PLY,XRAY,STRL,LF: Brand: MEDLINE

## (undated) DEVICE — PK CATH CARD 40

## (undated) DEVICE — ADHS SKIN DERMABOND TOP ADVANCED

## (undated) DEVICE — CATH VENT MIV RADL PIG ST TIP 5F 110CM

## (undated) DEVICE — TBG PRESS HI FLX BR 96IN

## (undated) DEVICE — SUT SILK 3/0 TIES 18IN A184H

## (undated) DEVICE — CATH GUIDE SOFTVU FLUSH HT PIG .038 5F 110CM

## (undated) DEVICE — CATH ANGIO TRCN NB BCN .038 5F 100CM DAV

## (undated) DEVICE — Device

## (undated) DEVICE — COVER,MAYO STAND,STERILE: Brand: MEDLINE

## (undated) DEVICE — SUT SILK 3/0 SH CR5 18IN C0135

## (undated) DEVICE — ADAPT CHECKFLO PERFORMER ASSEMBL

## (undated) DEVICE — CATH F4 INF AR I MOD 100CM: Brand: INFINITI

## (undated) DEVICE — INFLATION DEVICE: Brand: ENCORE™ 26

## (undated) DEVICE — SUT SILK 2/0 TIES 18IN A185H

## (undated) DEVICE — THE SHOCKWAVE MEDICAL LITHOPLASTY® CATHETER IS A PROPRIETARY BALLOON CATHETER FOR LITHOTRIPSY-ENHANCED BALLOON DILATION OF OTHERWISE DIFFICULT TO TREAT CALCIFIED STENOSIS OF THE PERIPHERAL VASCULATURE.: Brand: LITHOPLASTY®

## (undated) DEVICE — SUT SILK 4/0 TIES 18IN A183H

## (undated) DEVICE — ANTIBACTERIAL UNDYED BRAIDED (POLYGLACTIN 910), SYNTHETIC ABSORBABLE SUTURE: Brand: COATED VICRYL

## (undated) DEVICE — PK ANGIO 40

## (undated) DEVICE — IRRIGATOR BULB ASEPTO 60CC STRL

## (undated) DEVICE — CATH DIAG CARD PERFORMA JL4.0 BT 4F100CM

## (undated) DEVICE — SOL NACL 0.9PCT 1000ML

## (undated) DEVICE — DESTINATION CAROTID GUIDING SHEATH: Brand: DESTINATION

## (undated) DEVICE — GLV SURG BIOGEL LTX PF 7 1/2

## (undated) DEVICE — GLIDESHEATH BASIC HYDROPHILIC COATED INTRODUCER SHEATH: Brand: GLIDESHEATH

## (undated) DEVICE — SPNG LAP 18X18IN LF STRL PK/5

## (undated) DEVICE — THE SHOCKWAVE MEDICAL LITHOPLASTY® CATHETER IS A PROPRIETARY BALLOON CATHETER FOR LITHOTRIPSY-ENHANCED BALLOON DILATATION OF OTHERWISE DIFFICULT TO TREAT CALCIFIED STENOSIS OF THE PERIPHERAL VASCULATURE.: Brand: LITHOPLASTY®

## (undated) DEVICE — RADIFOCUS TORQUE DEVICE MULTI-TORQUE VISE: Brand: RADIFOCUS TORQUE DEVICE

## (undated) DEVICE — ST ACC MICROPUNCTURE STFF .018 ECHO/PLDM/TP 4F/10CM 21G/7CM

## (undated) DEVICE — CATH VENT MIV RADL PIG ST TIP 4F 110CM

## (undated) DEVICE — CATH DIAG SITESEER 4FAL I

## (undated) DEVICE — CATH DIAG CARD PERFORMA IMA BT 4F100CM

## (undated) DEVICE — KT MANIFLD CARDIAC

## (undated) DEVICE — TUBING, SUCTION, 1/4" X 20', STRAIGHT: Brand: MEDLINE INDUSTRIES, INC.

## (undated) DEVICE — HI-TORQUE SPARTACORE 14 PERIPHERAL GUIDE WIRE .014 5.0 CM X 300 CM: Brand: SPARTACORE

## (undated) DEVICE — SYRINGE KIT,PACKAGED,,150FT,MK 7(ANGIO-ARTERION, 150ML SYR KIT W/QFT,MC)(60729385): Brand: MEDRAD® MARK 7 ARTERION DISPOSABLE SYRINGE 150 ML WITH QUICK FILL TUBE

## (undated) DEVICE — INTENDED FOR TISSUE SEPARATION, AND OTHER PROCEDURES THAT REQUIRE A SHARP SURGICAL BLADE TO PUNCTURE OR CUT.: Brand: BARD-PARKER ® CARBON RIB-BACK BLADES

## (undated) DEVICE — STPCK 3WY HP ROT

## (undated) DEVICE — CATH DIAG IMPULSE FR4 5F 100CM

## (undated) DEVICE — SUT PROLN 6/0 BV1 D/A 30IN 8709H

## (undated) DEVICE — GW EMR FIX EXCHG J STD .035 3MM 260CM

## (undated) DEVICE — CVR PROB 96IN LF STRL

## (undated) DEVICE — SCANLAN® SUTURE BOOT™ INSTRUMENT JAW COVERS - ORIGINAL YELLOW, STANDARD PKG (5 PAIR/CARTRIDGE, 1 CARTRIDGE/PKG): Brand: SCANLAN® SUTURE BOOT™ INSTRUMENT JAW COVERS

## (undated) DEVICE — COUNT NDL MAG XLG

## (undated) DEVICE — CATH DIAG IMPULSE FL3.5 5F 100CM

## (undated) DEVICE — CATH DIAG CARD PERFORMA JL3.5 BT 4F100CM

## (undated) DEVICE — SINGLE BASIN: Brand: CARDINAL HEALTH